# Patient Record
Sex: FEMALE | Race: AMERICAN INDIAN OR ALASKA NATIVE | Employment: OTHER | URBAN - METROPOLITAN AREA
[De-identification: names, ages, dates, MRNs, and addresses within clinical notes are randomized per-mention and may not be internally consistent; named-entity substitution may affect disease eponyms.]

---

## 2017-03-09 ENCOUNTER — HOSPITAL ENCOUNTER (OUTPATIENT)
Dept: OTHER | Age: 82
Discharge: HOME | End: 2017-03-09
Attending: INTERNAL MEDICINE | Admitting: INTERNAL MEDICINE

## 2017-03-09 DIAGNOSIS — R52 PAIN: ICD-10-CM

## 2017-06-05 RX ORDER — DOXEPIN HYDROCHLORIDE 50 MG/1
50 CAPSULE ORAL NIGHTLY
Qty: 30 CAPSULE | Refills: 3 | Status: SHIPPED | OUTPATIENT
Start: 2017-06-05 | End: 2017-07-25 | Stop reason: ALTCHOICE

## 2017-08-07 RX ORDER — GUAIFENESIN/DEXTROMETHORPHAN 100-10MG/5
10 SYRUP ORAL 3 TIMES DAILY PRN
Qty: 240 ML | Refills: 1 | Status: SHIPPED | OUTPATIENT
Start: 2017-08-07 | End: 2017-08-17

## 2017-08-07 RX ORDER — DENTURE CLEANSER
1 TABLET, EFFERVESCENT DENTAL DAILY
Qty: 30 TABLET | Refills: 5 | Status: SHIPPED | OUTPATIENT
Start: 2017-08-07

## 2017-11-14 RX ORDER — PIOGLITAZONE HCL AND METFORMIN HCL 500; 15 MG/1; MG/1
0.5 TABLET ORAL 2 TIMES DAILY WITH MEALS
Qty: 90 TABLET | Refills: 0 | Status: SHIPPED | OUTPATIENT
Start: 2017-11-14 | End: 2018-03-30 | Stop reason: SDUPTHER

## 2017-11-14 RX ORDER — ATORVASTATIN CALCIUM 10 MG/1
10 TABLET, FILM COATED ORAL DAILY
Qty: 90 TABLET | Refills: 0 | Status: SHIPPED | OUTPATIENT
Start: 2017-11-14 | End: 2018-03-30 | Stop reason: SDUPTHER

## 2017-11-14 RX ORDER — OLMESARTAN MEDOXOMIL, AMLODIPINE AND HYDROCHLOROTHIAZIDE TABLET 40/5/12.5 MG 40; 5; 12.5 MG/1; MG/1; MG/1
0.5 TABLET ORAL DAILY
Qty: 45 TABLET | Refills: 0 | Status: SHIPPED | OUTPATIENT
Start: 2017-11-14 | End: 2018-03-30 | Stop reason: SDUPTHER

## 2018-08-17 RX ORDER — LEVOFLOXACIN 500 MG/1
500 TABLET, FILM COATED ORAL DAILY
Qty: 10 TABLET | Refills: 0 | Status: SHIPPED | OUTPATIENT
Start: 2018-08-17 | End: 2018-08-27

## 2018-08-27 RX ORDER — AMLODIPINE BESYLATE 5 MG/1
5 TABLET ORAL DAILY
Qty: 30 TABLET | Refills: 3 | Status: SHIPPED | OUTPATIENT
Start: 2018-08-27 | End: 2018-11-01 | Stop reason: SDUPTHER

## 2018-08-27 RX ORDER — OLMESARTAN MEDOXOMIL 40 MG/1
40 TABLET ORAL DAILY
Qty: 30 TABLET | Refills: 3 | Status: SHIPPED | OUTPATIENT
Start: 2018-08-27 | End: 2018-11-01 | Stop reason: SDUPTHER

## 2018-08-27 RX ORDER — HYDROCHLOROTHIAZIDE 12.5 MG/1
12.5 CAPSULE, GELATIN COATED ORAL DAILY
Qty: 30 CAPSULE | Refills: 3 | Status: SHIPPED | OUTPATIENT
Start: 2018-08-27 | End: 2018-11-01 | Stop reason: SDUPTHER

## 2018-11-05 PROBLEM — E11.42 DIABETIC PERIPHERAL NEUROPATHY (HCC): Status: ACTIVE | Noted: 2018-11-05

## 2018-11-28 RX ORDER — TRAZODONE HYDROCHLORIDE 50 MG/1
50 TABLET ORAL NIGHTLY
Qty: 30 TABLET | Refills: 3 | Status: SHIPPED | OUTPATIENT
Start: 2018-11-28 | End: 2019-03-10 | Stop reason: ALTCHOICE

## 2018-11-28 RX ORDER — OLMESARTAN MEDOXOMIL, AMLODIPINE AND HYDROCHLOROTHIAZIDE TABLET 40/5/12.5 MG 40; 5; 12.5 MG/1; MG/1; MG/1
1 TABLET ORAL DAILY
Qty: 30 TABLET | Refills: 2 | Status: SHIPPED | OUTPATIENT
Start: 2018-11-28 | End: 2019-02-14 | Stop reason: SDUPTHER

## 2018-11-28 RX ORDER — FOLIC ACID 1 MG/1
1 TABLET ORAL DAILY
Qty: 90 TABLET | Refills: 3 | Status: SHIPPED | OUTPATIENT
Start: 2018-11-28 | End: 2019-03-10 | Stop reason: ALTCHOICE

## 2019-02-14 RX ORDER — OLMESARTAN MEDOXOMIL, AMLODIPINE AND HYDROCHLOROTHIAZIDE TABLET 40/5/12.5 MG 40; 5; 12.5 MG/1; MG/1; MG/1
1 TABLET ORAL DAILY
Qty: 90 TABLET | Refills: 0 | Status: SHIPPED | OUTPATIENT
Start: 2019-02-14 | End: 2019-03-10

## 2019-03-10 DIAGNOSIS — E11.9 CONTROLLED TYPE 2 DIABETES MELLITUS WITHOUT COMPLICATION, WITHOUT LONG-TERM CURRENT USE OF INSULIN (HCC): Primary | ICD-10-CM

## 2019-03-10 DIAGNOSIS — I10 ESSENTIAL HYPERTENSION: ICD-10-CM

## 2019-03-10 RX ORDER — FLUTICASONE PROPIONATE 50 MCG
1 SPRAY, SUSPENSION (ML) NASAL DAILY
Qty: 2 BOTTLE | Refills: 1 | Status: SHIPPED | OUTPATIENT
Start: 2019-03-10 | End: 2021-01-13

## 2019-03-10 RX ORDER — PIOGLITAZONE HCL AND METFORMIN HCL 500; 15 MG/1; MG/1
1 TABLET ORAL 2 TIMES DAILY WITH MEALS
Qty: 180 TABLET | Refills: 0 | Status: SHIPPED | OUTPATIENT
Start: 2019-03-10 | End: 2019-07-11 | Stop reason: SDUPTHER

## 2019-03-10 RX ORDER — ACETAMINOPHEN 160 MG
1 TABLET,DISINTEGRATING ORAL DAILY
Qty: 90 CAPSULE | Refills: 0 | Status: SHIPPED | OUTPATIENT
Start: 2019-03-10 | End: 2019-09-04 | Stop reason: SDUPTHER

## 2019-03-10 RX ORDER — CELECOXIB 200 MG/1
200 CAPSULE ORAL DAILY
Qty: 90 CAPSULE | Refills: 1 | Status: SHIPPED | OUTPATIENT
Start: 2019-03-10 | End: 2019-12-09 | Stop reason: SDUPTHER

## 2019-03-10 RX ORDER — ATORVASTATIN CALCIUM 10 MG/1
10 TABLET, FILM COATED ORAL DAILY
Qty: 90 TABLET | Refills: 0 | Status: SHIPPED | OUTPATIENT
Start: 2019-03-10 | End: 2019-06-06 | Stop reason: SDUPTHER

## 2019-03-10 RX ORDER — OMEPRAZOLE 40 MG/1
40 CAPSULE, DELAYED RELEASE ORAL DAILY
Qty: 90 CAPSULE | Refills: 1 | Status: SHIPPED | OUTPATIENT
Start: 2019-03-10 | End: 2019-09-04 | Stop reason: SDUPTHER

## 2019-03-10 RX ORDER — OLMESARTAN MEDOXOMIL, AMLODIPINE AND HYDROCHLOROTHIAZIDE TABLET 40/5/25 MG 40; 5; 25 MG/1; MG/1; MG/1
1 TABLET ORAL DAILY
Qty: 90 TABLET | Refills: 0 | Status: SHIPPED | OUTPATIENT
Start: 2019-03-10 | End: 2019-06-06 | Stop reason: SDUPTHER

## 2019-04-08 ENCOUNTER — TELEPHONE (OUTPATIENT)
Dept: INTERNAL MEDICINE CLINIC | Age: 84
End: 2019-04-08

## 2019-04-08 DIAGNOSIS — I10 ESSENTIAL HYPERTENSION: ICD-10-CM

## 2019-04-08 DIAGNOSIS — E11.9 DIABETES MELLITUS TYPE 2 IN NONOBESE (HCC): Primary | ICD-10-CM

## 2019-04-09 ENCOUNTER — HOSPITAL ENCOUNTER (OUTPATIENT)
Age: 84
Discharge: HOME | End: 2019-04-09
Payer: MEDICARE

## 2019-04-09 DIAGNOSIS — E11.9 DIABETES MELLITUS TYPE 2 IN NONOBESE (HCC): ICD-10-CM

## 2019-04-09 LAB
A/G RATIO: 1.8 (ref 1.1–2.2)
ALBUMIN SERPL-MCNC: 4.4 G/DL (ref 3.4–5)
ALP BLD-CCNC: 65 U/L (ref 40–129)
ALT SERPL-CCNC: 15 U/L (ref 10–40)
ANION GAP SERPL CALCULATED.3IONS-SCNC: 10 MMOL/L (ref 3–16)
AST SERPL-CCNC: 19 U/L (ref 15–37)
BILIRUB SERPL-MCNC: 0.3 MG/DL (ref 0–1)
BUN BLDV-MCNC: 14 MG/DL (ref 7–20)
CALCIUM SERPL-MCNC: 9.8 MG/DL (ref 8.3–10.6)
CHLORIDE BLD-SCNC: 100 MMOL/L (ref 99–110)
CHOLESTEROL, TOTAL: 148 MG/DL (ref 0–199)
CO2: 31 MMOL/L (ref 21–32)
CREAT SERPL-MCNC: <0.5 MG/DL (ref 0.6–1.2)
CREATININE URINE: 70 MG/DL (ref 28–259)
GFR AFRICAN AMERICAN: >60
GFR NON-AFRICAN AMERICAN: >60
GLOBULIN: 2.5 G/DL
GLUCOSE BLD-MCNC: 108 MG/DL (ref 70–99)
HDLC SERPL-MCNC: 69 MG/DL (ref 40–60)
LDL CHOLESTEROL CALCULATED: 65 MG/DL
MICROALBUMIN UR-MCNC: <1.2 MG/DL
MICROALBUMIN/CREAT UR-RTO: NORMAL MG/G (ref 0–30)
POTASSIUM SERPL-SCNC: 4 MMOL/L (ref 3.5–5.1)
SODIUM BLD-SCNC: 141 MMOL/L (ref 136–145)
TOTAL PROTEIN: 6.9 G/DL (ref 6.4–8.2)
TRIGL SERPL-MCNC: 71 MG/DL (ref 0–150)
TSH SERPL DL<=0.05 MIU/L-ACNC: 4.19 UIU/ML (ref 0.27–4.2)
VLDLC SERPL CALC-MCNC: 14 MG/DL

## 2019-04-09 PROCEDURE — 36415 COLL VENOUS BLD VENIPUNCTURE: CPT

## 2019-04-09 PROCEDURE — 84443 ASSAY THYROID STIM HORMONE: CPT

## 2019-04-09 PROCEDURE — 80053 COMPREHEN METABOLIC PANEL: CPT

## 2019-04-09 PROCEDURE — 82570 ASSAY OF URINE CREATININE: CPT

## 2019-04-09 PROCEDURE — 82043 UR ALBUMIN QUANTITATIVE: CPT

## 2019-04-09 PROCEDURE — 80061 LIPID PANEL: CPT

## 2019-04-17 RX ORDER — TRAZODONE HYDROCHLORIDE 50 MG/1
50 TABLET ORAL NIGHTLY
Qty: 90 TABLET | Refills: 1 | Status: SHIPPED | OUTPATIENT
Start: 2019-04-17 | End: 2019-12-08

## 2019-04-17 RX ORDER — ALBUTEROL SULFATE 90 UG/1
2 AEROSOL, METERED RESPIRATORY (INHALATION) EVERY 6 HOURS PRN
Qty: 1 INHALER | Refills: 3 | Status: SHIPPED | OUTPATIENT
Start: 2019-04-17 | End: 2019-10-20 | Stop reason: SDUPTHER

## 2019-06-06 RX ORDER — OLMESARTAN MEDOXOMIL, AMLODIPINE AND HYDROCHLOROTHIAZIDE TABLET 40/5/25 MG 40; 5; 25 MG/1; MG/1; MG/1
1 TABLET ORAL DAILY
Qty: 90 TABLET | Refills: 0 | Status: SHIPPED | OUTPATIENT
Start: 2019-06-06 | End: 2019-09-04 | Stop reason: SDUPTHER

## 2019-06-06 RX ORDER — ATORVASTATIN CALCIUM 10 MG/1
10 TABLET, FILM COATED ORAL DAILY
Qty: 90 TABLET | Refills: 0 | Status: SHIPPED | OUTPATIENT
Start: 2019-06-06 | End: 2019-09-04 | Stop reason: SDUPTHER

## 2019-07-11 RX ORDER — PIOGLITAZONE HCL AND METFORMIN HCL 500; 15 MG/1; MG/1
1 TABLET ORAL 2 TIMES DAILY WITH MEALS
Qty: 180 TABLET | Refills: 0 | Status: SHIPPED | OUTPATIENT
Start: 2019-07-11 | End: 2019-09-04 | Stop reason: SDUPTHER

## 2019-07-15 RX ORDER — CALCIUM POLYCARBOPHIL 625 MG
625 TABLET ORAL 2 TIMES DAILY
Qty: 60 TABLET | Refills: 2 | Status: SHIPPED | OUTPATIENT
Start: 2019-07-15 | End: 2019-08-14

## 2019-09-04 RX ORDER — OLMESARTAN MEDOXOMIL, AMLODIPINE AND HYDROCHLOROTHIAZIDE TABLET 40/5/25 MG 40; 5; 25 MG/1; MG/1; MG/1
1 TABLET ORAL DAILY
Qty: 90 TABLET | Refills: 0 | Status: SHIPPED | OUTPATIENT
Start: 2019-09-04 | End: 2020-01-09

## 2019-09-04 RX ORDER — OMEPRAZOLE 40 MG/1
40 CAPSULE, DELAYED RELEASE ORAL DAILY
Qty: 90 CAPSULE | Refills: 1 | Status: SHIPPED | OUTPATIENT
Start: 2019-09-04 | End: 2020-02-26 | Stop reason: SDUPTHER

## 2019-09-04 RX ORDER — ASPIRIN 81 MG/1
81 TABLET ORAL DAILY
Qty: 90 TABLET | Refills: 3 | Status: SHIPPED | OUTPATIENT
Start: 2019-09-04 | End: 2021-01-13 | Stop reason: SDUPTHER

## 2019-09-04 RX ORDER — PIOGLITAZONE HCL AND METFORMIN HCL 500; 15 MG/1; MG/1
1 TABLET ORAL 2 TIMES DAILY WITH MEALS
Qty: 180 TABLET | Refills: 0 | Status: SHIPPED | OUTPATIENT
Start: 2019-09-04 | End: 2020-04-09

## 2019-09-04 RX ORDER — ACETAMINOPHEN 160 MG
1 TABLET,DISINTEGRATING ORAL DAILY
Qty: 90 CAPSULE | Refills: 0 | Status: SHIPPED | OUTPATIENT
Start: 2019-09-04 | End: 2021-09-28 | Stop reason: SDUPTHER

## 2019-09-04 RX ORDER — ATORVASTATIN CALCIUM 10 MG/1
10 TABLET, FILM COATED ORAL DAILY
Qty: 90 TABLET | Refills: 0 | Status: SHIPPED | OUTPATIENT
Start: 2019-09-04 | End: 2020-01-06 | Stop reason: SDUPTHER

## 2019-09-12 ENCOUNTER — HOSPITAL ENCOUNTER (OUTPATIENT)
Age: 84
Discharge: HOME | End: 2019-09-12
Payer: MEDICARE

## 2019-09-12 ENCOUNTER — HOSPITAL ENCOUNTER (OUTPATIENT)
Dept: GENERAL RADIOLOGY | Age: 84
Discharge: HOME | End: 2019-09-12
Payer: MEDICARE

## 2019-09-12 ENCOUNTER — HOSPITAL ENCOUNTER (OUTPATIENT)
Dept: PULMONOLOGY | Age: 84
Discharge: HOME | End: 2019-09-12
Payer: MEDICARE

## 2019-09-12 VITALS — OXYGEN SATURATION: 98 % | RESPIRATION RATE: 16 BRPM | HEART RATE: 77 BPM

## 2019-09-12 DIAGNOSIS — R05.3 CHRONIC COUGH: ICD-10-CM

## 2019-09-12 DIAGNOSIS — J45.20 MILD INTERMITTENT REACTIVE AIRWAY DISEASE WITHOUT COMPLICATION: ICD-10-CM

## 2019-09-12 DIAGNOSIS — R06.89 DYSPNEA AND RESPIRATORY ABNORMALITIES: ICD-10-CM

## 2019-09-12 DIAGNOSIS — R06.00 DYSPNEA AND RESPIRATORY ABNORMALITIES: ICD-10-CM

## 2019-09-12 PROBLEM — I51.89 DIASTOLIC DYSFUNCTION: Status: ACTIVE | Noted: 2019-09-12

## 2019-09-12 LAB
DLCO %PRED: 59 %
DLCO PRED: NORMAL ML/MIN/MMHG
DLCO/VA %PRED: NORMAL %
DLCO/VA PRED: NORMAL ML/MIN/MMHG
DLCO/VA: NORMAL ML/MIN/MMHG
DLCO: NORMAL ML/MIN/MMHG
EXPIRATORY TIME: NORMAL SEC
FEF 25-75% %PRED-PRE: NORMAL L/SEC
FEF 25-75% PRED: NORMAL L/SEC
FEF 25-75%-PRE: NORMAL L/SEC
FEV1 %PRED-PRE: 58 %
FEV1 PRED: NORMAL L
FEV1/FVC %PRED-PRE: NORMAL %
FEV1/FVC PRED: NORMAL %
FEV1/FVC: 94 %
FEV1: NORMAL L
FVC %PRED-PRE: NORMAL %
FVC PRED: NORMAL L
FVC: NORMAL L
GAW %PRED: NORMAL %
GAW PRED: NORMAL L/S/CMH2O
GAW: NORMAL L/S/CMH2O
IC %PRED: NORMAL %
IC PRED: NORMAL L
IC: NORMAL L
MVV %PRED-PRE: NORMAL %
MVV PRED: NORMAL L/MIN
MVV-PRE: NORMAL L/MIN
PEF %PRED-PRE: NORMAL L/SEC
PEF PRED: NORMAL L/SEC
PEF-PRE: NORMAL L/SEC
RAW %PRED: NORMAL %
RAW PRED: NORMAL CMH2O/L/S
RAW: NORMAL CMH2O/L/S
RV %PRED: NORMAL %
RV PRED: NORMAL L
RV: NORMAL L
SVC %PRED: NORMAL %
SVC PRED: NORMAL L
SVC: NORMAL L
TLC %PRED: 216 %
TLC PRED: NORMAL L
TLC: NORMAL L
VA %PRED: NORMAL %
VA PRED: NORMAL L
VA: NORMAL L
VTG %PRED: NORMAL %
VTG PRED: NORMAL L
VTG: NORMAL L

## 2019-09-12 PROCEDURE — 94760 N-INVAS EAR/PLS OXIMETRY 1: CPT

## 2019-09-12 PROCEDURE — 6370000000 HC RX 637 (ALT 250 FOR IP): Performed by: INTERNAL MEDICINE

## 2019-09-12 PROCEDURE — 71046 X-RAY EXAM CHEST 2 VIEWS: CPT

## 2019-09-12 PROCEDURE — 94726 PLETHYSMOGRAPHY LUNG VOLUMES: CPT

## 2019-09-12 PROCEDURE — 94060 EVALUATION OF WHEEZING: CPT

## 2019-09-12 PROCEDURE — 94200 LUNG FUNCTION TEST (MBC/MVV): CPT

## 2019-09-12 PROCEDURE — 94729 DIFFUSING CAPACITY: CPT

## 2019-09-12 RX ORDER — ALBUTEROL SULFATE 90 UG/1
4 AEROSOL, METERED RESPIRATORY (INHALATION) ONCE
Status: COMPLETED | OUTPATIENT
Start: 2019-09-12 | End: 2019-09-12

## 2019-09-12 RX ADMIN — Medication 4 PUFF: at 14:42

## 2019-09-12 ASSESSMENT — PULMONARY FUNCTION TESTS
FEV1_PERCENT_PREDICTED_PRE: 58
FEV1/FVC: 94

## 2019-09-13 RX ORDER — FLUTICASONE PROPIONATE 50 MCG
2 SPRAY, SUSPENSION (ML) NASAL DAILY
Qty: 1 BOTTLE | Refills: 5 | Status: SHIPPED | OUTPATIENT
Start: 2019-09-13 | End: 2021-01-13

## 2019-09-28 DIAGNOSIS — E11.9 CONTROLLED TYPE 2 DIABETES MELLITUS WITHOUT COMPLICATION, WITHOUT LONG-TERM CURRENT USE OF INSULIN (HCC): Primary | ICD-10-CM

## 2019-09-28 RX ORDER — GLUCOSAMINE HCL/CHONDROITIN SU 500-400 MG
CAPSULE ORAL
Qty: 50 STRIP | Refills: 0 | Status: SHIPPED | OUTPATIENT
Start: 2019-09-28 | End: 2019-10-28 | Stop reason: SDUPTHER

## 2019-09-28 RX ORDER — BLOOD PRESSURE TEST KIT
1 KIT MISCELLANEOUS DAILY
Qty: 100 EACH | Refills: 2 | Status: SHIPPED | OUTPATIENT
Start: 2019-09-28 | End: 2023-09-06 | Stop reason: SDUPTHER

## 2019-10-20 RX ORDER — ALBUTEROL SULFATE 90 UG/1
2 AEROSOL, METERED RESPIRATORY (INHALATION) EVERY 6 HOURS PRN
Qty: 1 INHALER | Refills: 3 | Status: SHIPPED | OUTPATIENT
Start: 2019-10-20 | End: 2020-09-24

## 2019-10-28 DIAGNOSIS — E11.9 CONTROLLED TYPE 2 DIABETES MELLITUS WITHOUT COMPLICATION, WITHOUT LONG-TERM CURRENT USE OF INSULIN (HCC): Primary | ICD-10-CM

## 2019-10-28 RX ORDER — GLUCOSAMINE HCL/CHONDROITIN SU 500-400 MG
CAPSULE ORAL
Qty: 50 STRIP | Refills: 0 | Status: ON HOLD | OUTPATIENT
Start: 2019-10-28 | End: 2023-03-08 | Stop reason: HOSPADM

## 2019-12-08 RX ORDER — DOXEPIN HYDROCHLORIDE 50 MG/1
50 CAPSULE ORAL NIGHTLY
Qty: 30 CAPSULE | Refills: 3 | Status: SHIPPED | OUTPATIENT
Start: 2019-12-08 | End: 2020-04-02

## 2019-12-11 RX ORDER — CELECOXIB 200 MG/1
200 CAPSULE ORAL DAILY
Qty: 90 CAPSULE | Refills: 1 | Status: SHIPPED | OUTPATIENT
Start: 2019-12-11 | End: 2021-01-13

## 2020-01-06 RX ORDER — ATORVASTATIN CALCIUM 10 MG/1
10 TABLET, FILM COATED ORAL DAILY
Qty: 90 TABLET | Refills: 0 | Status: SHIPPED | OUTPATIENT
Start: 2020-01-06 | End: 2020-04-09

## 2020-02-26 RX ORDER — OMEPRAZOLE 40 MG/1
40 CAPSULE, DELAYED RELEASE ORAL DAILY
Qty: 90 CAPSULE | Refills: 1 | Status: SHIPPED | OUTPATIENT
Start: 2020-02-26 | End: 2020-09-24 | Stop reason: SDUPTHER

## 2020-04-12 RX ORDER — SIMETHICONE 80 MG
80 TABLET,CHEWABLE ORAL 4 TIMES DAILY PRN
Qty: 180 TABLET | Refills: 3 | Status: SHIPPED | OUTPATIENT
Start: 2020-04-12 | End: 2021-01-13

## 2020-04-12 RX ORDER — DOXEPIN HYDROCHLORIDE 50 MG/1
50 CAPSULE ORAL NIGHTLY
Qty: 90 CAPSULE | Refills: 2 | Status: SHIPPED | OUTPATIENT
Start: 2020-04-12 | End: 2020-09-19 | Stop reason: SDUPTHER

## 2020-05-25 RX ORDER — PIOGLITAZONE HCL AND METFORMIN HCL 500; 15 MG/1; MG/1
1 TABLET ORAL 2 TIMES DAILY WITH MEALS
Qty: 180 TABLET | Refills: 0 | Status: SHIPPED | OUTPATIENT
Start: 2020-05-25 | End: 2020-09-14

## 2020-05-25 RX ORDER — ATORVASTATIN CALCIUM 10 MG/1
10 TABLET, FILM COATED ORAL DAILY
Qty: 90 TABLET | Refills: 0 | Status: SHIPPED | OUTPATIENT
Start: 2020-05-25 | End: 2020-09-14

## 2020-05-25 RX ORDER — OLMESARTAN MEDOXOMIL, AMLODIPINE AND HYDROCHLOROTHIAZIDE TABLET 40/5/25 MG 40; 5; 25 MG/1; MG/1; MG/1
1 TABLET ORAL DAILY
Qty: 90 TABLET | Refills: 0 | Status: SHIPPED | OUTPATIENT
Start: 2020-05-25 | End: 2020-09-14

## 2020-05-26 ENCOUNTER — HOSPITAL ENCOUNTER (OUTPATIENT)
Dept: GENERAL RADIOLOGY | Age: 85
Discharge: HOME | End: 2020-05-26
Payer: MEDICARE

## 2020-05-26 ENCOUNTER — HOSPITAL ENCOUNTER (OUTPATIENT)
Age: 85
Discharge: HOME | End: 2020-05-26
Payer: MEDICARE

## 2020-05-26 PROCEDURE — 73562 X-RAY EXAM OF KNEE 3: CPT

## 2020-05-27 RX ORDER — ALBUTEROL SULFATE 90 UG/1
2 AEROSOL, METERED RESPIRATORY (INHALATION) 4 TIMES DAILY PRN
Qty: 3 INHALER | Refills: 1 | Status: SHIPPED | OUTPATIENT
Start: 2020-05-27 | End: 2020-12-13

## 2020-06-01 ENCOUNTER — HOSPITAL ENCOUNTER (OUTPATIENT)
Age: 85
Discharge: HOME | End: 2020-06-01
Payer: MEDICARE

## 2020-06-01 LAB
A/G RATIO: 1.7 (ref 1.1–2.2)
ALBUMIN SERPL-MCNC: 4.3 G/DL (ref 3.4–5)
ALP BLD-CCNC: 68 U/L (ref 40–129)
ALT SERPL-CCNC: 20 U/L (ref 10–40)
ANION GAP SERPL CALCULATED.3IONS-SCNC: 9 MMOL/L (ref 3–16)
AST SERPL-CCNC: 24 U/L (ref 15–37)
BILIRUB SERPL-MCNC: 0.3 MG/DL (ref 0–1)
BUN BLDV-MCNC: 18 MG/DL (ref 7–20)
CALCIUM SERPL-MCNC: 9.4 MG/DL (ref 8.3–10.6)
CHLORIDE BLD-SCNC: 96 MMOL/L (ref 99–110)
CHOLESTEROL, TOTAL: 157 MG/DL (ref 0–199)
CO2: 31 MMOL/L (ref 21–32)
CREAT SERPL-MCNC: 0.6 MG/DL (ref 0.6–1.2)
CREATININE URINE: 59.4 MG/DL (ref 28–259)
ESTIMATED AVERAGE GLUCOSE: 148.5 MG/DL
GFR AFRICAN AMERICAN: >60
GFR NON-AFRICAN AMERICAN: >60
GLOBULIN: 2.5 G/DL
GLUCOSE BLD-MCNC: 105 MG/DL (ref 70–99)
HBA1C MFR BLD: 6.8 %
HCT VFR BLD CALC: 39.1 % (ref 36–48)
HDLC SERPL-MCNC: 69 MG/DL (ref 40–60)
HEMOGLOBIN: 13 G/DL (ref 12–16)
LDL CHOLESTEROL CALCULATED: 71 MG/DL
MCH RBC QN AUTO: 29.7 PG (ref 26–34)
MCHC RBC AUTO-ENTMCNC: 33.4 G/DL (ref 31–36)
MCV RBC AUTO: 89 FL (ref 80–100)
MICROALBUMIN UR-MCNC: <1.2 MG/DL
MICROALBUMIN/CREAT UR-RTO: NORMAL MG/G (ref 0–30)
PDW BLD-RTO: 14.4 % (ref 12.4–15.4)
PLATELET # BLD: 238 K/UL (ref 135–450)
PMV BLD AUTO: 9.1 FL (ref 5–10.5)
POTASSIUM SERPL-SCNC: 4.1 MMOL/L (ref 3.5–5.1)
RBC # BLD: 4.39 M/UL (ref 4–5.2)
SODIUM BLD-SCNC: 136 MMOL/L (ref 136–145)
TOTAL PROTEIN: 6.8 G/DL (ref 6.4–8.2)
TRIGL SERPL-MCNC: 87 MG/DL (ref 0–150)
TSH SERPL DL<=0.05 MIU/L-ACNC: 8.53 UIU/ML (ref 0.27–4.2)
VLDLC SERPL CALC-MCNC: 17 MG/DL
WBC # BLD: 6.3 K/UL (ref 4–11)

## 2020-06-01 PROCEDURE — 84443 ASSAY THYROID STIM HORMONE: CPT

## 2020-06-01 PROCEDURE — 36415 COLL VENOUS BLD VENIPUNCTURE: CPT

## 2020-06-01 PROCEDURE — 82043 UR ALBUMIN QUANTITATIVE: CPT

## 2020-06-01 PROCEDURE — 85027 COMPLETE CBC AUTOMATED: CPT

## 2020-06-01 PROCEDURE — 83036 HEMOGLOBIN GLYCOSYLATED A1C: CPT

## 2020-06-01 PROCEDURE — 82570 ASSAY OF URINE CREATININE: CPT

## 2020-06-01 PROCEDURE — 80053 COMPREHEN METABOLIC PANEL: CPT

## 2020-06-01 PROCEDURE — 80061 LIPID PANEL: CPT

## 2020-06-01 RX ORDER — LEVOTHYROXINE SODIUM 0.05 MG/1
50 TABLET ORAL DAILY
Qty: 90 TABLET | Refills: 1 | Status: SHIPPED | OUTPATIENT
Start: 2020-06-01 | End: 2021-01-13

## 2020-08-09 RX ORDER — BUDESONIDE AND FORMOTEROL FUMARATE DIHYDRATE 160; 4.5 UG/1; UG/1
2 AEROSOL RESPIRATORY (INHALATION) 2 TIMES DAILY
Qty: 3 INHALER | Refills: 1 | Status: SHIPPED | OUTPATIENT
Start: 2020-08-09 | End: 2020-12-13

## 2020-08-16 RX ORDER — MONTELUKAST SODIUM 10 MG/1
10 TABLET ORAL DAILY
Qty: 30 TABLET | Refills: 3 | Status: SHIPPED | OUTPATIENT
Start: 2020-08-16 | End: 2021-09-28

## 2020-08-16 RX ORDER — FLUTICASONE PROPIONATE 50 MCG
1 SPRAY, SUSPENSION (ML) NASAL DAILY
Qty: 2 BOTTLE | Refills: 1 | Status: SHIPPED | OUTPATIENT
Start: 2020-08-16 | End: 2021-01-13

## 2020-09-19 RX ORDER — DOXEPIN HYDROCHLORIDE 50 MG/1
50 CAPSULE ORAL NIGHTLY
Qty: 90 CAPSULE | Refills: 1 | Status: SHIPPED | OUTPATIENT
Start: 2020-09-19 | End: 2021-01-13 | Stop reason: SDUPTHER

## 2020-10-19 RX ORDER — OLMESARTAN MEDOXOMIL, AMLODIPINE AND HYDROCHLOROTHIAZIDE TABLET 40/10/25 MG 40; 10; 25 MG/1; MG/1; MG/1
1 TABLET ORAL DAILY
Qty: 30 TABLET | Refills: 1 | Status: SHIPPED | OUTPATIENT
Start: 2020-10-19 | End: 2020-11-09 | Stop reason: SDUPTHER

## 2020-10-26 RX ORDER — BUDESONIDE AND FORMOTEROL FUMARATE DIHYDRATE 160; 4.5 UG/1; UG/1
2 AEROSOL RESPIRATORY (INHALATION) 2 TIMES DAILY
Qty: 3 INHALER | Refills: 1 | Status: SHIPPED | OUTPATIENT
Start: 2020-10-26 | End: 2021-01-13 | Stop reason: SDUPTHER

## 2021-01-13 RX ORDER — PIOGLITAZONE HCL AND METFORMIN HCL 500; 15 MG/1; MG/1
1 TABLET ORAL 2 TIMES DAILY WITH MEALS
Qty: 180 TABLET | Refills: 0 | Status: SHIPPED | OUTPATIENT
Start: 2021-01-13 | End: 2021-02-18 | Stop reason: SDUPTHER

## 2021-01-13 RX ORDER — OMEPRAZOLE 40 MG/1
40 CAPSULE, DELAYED RELEASE ORAL DAILY
Qty: 90 CAPSULE | Refills: 1 | Status: SHIPPED | OUTPATIENT
Start: 2021-01-13 | End: 2021-04-22 | Stop reason: SDUPTHER

## 2021-01-13 RX ORDER — ALBUTEROL SULFATE 90 UG/1
2 AEROSOL, METERED RESPIRATORY (INHALATION) 4 TIMES DAILY PRN
Qty: 3 INHALER | Refills: 1 | Status: SHIPPED | OUTPATIENT
Start: 2021-01-13 | End: 2021-06-17 | Stop reason: SDUPTHER

## 2021-01-13 RX ORDER — ATORVASTATIN CALCIUM 10 MG/1
10 TABLET, FILM COATED ORAL DAILY
Qty: 90 TABLET | Refills: 3 | Status: SHIPPED | OUTPATIENT
Start: 2021-01-13 | End: 2022-02-18 | Stop reason: SDUPTHER

## 2021-01-13 RX ORDER — ASPIRIN 81 MG/1
81 TABLET ORAL DAILY
Qty: 90 TABLET | Refills: 3 | Status: ON HOLD | OUTPATIENT
Start: 2021-01-13 | End: 2021-05-03

## 2021-01-13 RX ORDER — BUDESONIDE AND FORMOTEROL FUMARATE DIHYDRATE 160; 4.5 UG/1; UG/1
2 AEROSOL RESPIRATORY (INHALATION) 2 TIMES DAILY
Qty: 3 INHALER | Refills: 1 | Status: SHIPPED | OUTPATIENT
Start: 2021-01-13 | End: 2021-04-22 | Stop reason: SDUPTHER

## 2021-01-13 RX ORDER — DOXEPIN HYDROCHLORIDE 50 MG/1
50 CAPSULE ORAL NIGHTLY
Qty: 90 CAPSULE | Refills: 1 | Status: SHIPPED | OUTPATIENT
Start: 2021-01-13 | End: 2021-04-22 | Stop reason: SDUPTHER

## 2021-01-13 RX ORDER — OLMESARTAN MEDOXOMIL, AMLODIPINE AND HYDROCHLOROTHIAZIDE TABLET 40/10/25 MG 40; 10; 25 MG/1; MG/1; MG/1
1 TABLET ORAL DAILY
Qty: 30 TABLET | Refills: 3 | Status: SHIPPED | OUTPATIENT
Start: 2021-01-13 | End: 2021-01-23 | Stop reason: SDUPTHER

## 2021-01-23 RX ORDER — OLMESARTAN MEDOXOMIL, AMLODIPINE AND HYDROCHLOROTHIAZIDE TABLET 40/10/25 MG 40; 10; 25 MG/1; MG/1; MG/1
1 TABLET ORAL DAILY
Qty: 90 TABLET | Refills: 1 | Status: SHIPPED | OUTPATIENT
Start: 2021-01-23 | End: 2021-04-22 | Stop reason: SDUPTHER

## 2021-02-18 RX ORDER — PIOGLITAZONE HCL AND METFORMIN HCL 500; 15 MG/1; MG/1
1 TABLET ORAL 2 TIMES DAILY WITH MEALS
Qty: 180 TABLET | Refills: 0 | Status: SHIPPED | OUTPATIENT
Start: 2021-02-18 | End: 2021-04-22 | Stop reason: SDUPTHER

## 2021-02-20 ENCOUNTER — HOSPITAL ENCOUNTER (OUTPATIENT)
Age: 86
Discharge: HOME | End: 2021-02-20
Payer: MEDICARE

## 2021-02-20 DIAGNOSIS — E11.9 CONTROLLED TYPE 2 DIABETES MELLITUS WITHOUT COMPLICATION, WITHOUT LONG-TERM CURRENT USE OF INSULIN (HCC): ICD-10-CM

## 2021-02-20 LAB
A/G RATIO: 1.4 (ref 1.1–2.2)
ALBUMIN SERPL-MCNC: 4.3 G/DL (ref 3.4–5)
ALP BLD-CCNC: 74 U/L (ref 40–129)
ALT SERPL-CCNC: 18 U/L (ref 10–40)
ANION GAP SERPL CALCULATED.3IONS-SCNC: 11 MMOL/L (ref 3–16)
AST SERPL-CCNC: 23 U/L (ref 15–37)
BILIRUB SERPL-MCNC: <0.2 MG/DL (ref 0–1)
BUN BLDV-MCNC: 12 MG/DL (ref 7–20)
CALCIUM SERPL-MCNC: 9.5 MG/DL (ref 8.3–10.6)
CHLORIDE BLD-SCNC: 96 MMOL/L (ref 99–110)
CHOLESTEROL, TOTAL: 158 MG/DL (ref 0–199)
CO2: 28 MMOL/L (ref 21–32)
CREAT SERPL-MCNC: 0.6 MG/DL (ref 0.6–1.2)
CREATININE URINE: 36 MG/DL (ref 28–259)
GFR AFRICAN AMERICAN: >60
GFR NON-AFRICAN AMERICAN: >60
GLOBULIN: 3 G/DL
GLUCOSE BLD-MCNC: 107 MG/DL (ref 70–99)
HCT VFR BLD CALC: 40 % (ref 36–48)
HDLC SERPL-MCNC: 81 MG/DL (ref 40–60)
HEMOGLOBIN: 13.3 G/DL (ref 12–16)
LDL CHOLESTEROL CALCULATED: 66 MG/DL
MCH RBC QN AUTO: 29.7 PG (ref 26–34)
MCHC RBC AUTO-ENTMCNC: 33.2 G/DL (ref 31–36)
MCV RBC AUTO: 89.5 FL (ref 80–100)
MICROALBUMIN UR-MCNC: <1.2 MG/DL
MICROALBUMIN/CREAT UR-RTO: NORMAL MG/G (ref 0–30)
PDW BLD-RTO: 14.2 % (ref 12.4–15.4)
PLATELET # BLD: 231 K/UL (ref 135–450)
PMV BLD AUTO: 9.2 FL (ref 5–10.5)
POTASSIUM SERPL-SCNC: 3.8 MMOL/L (ref 3.5–5.1)
RBC # BLD: 4.47 M/UL (ref 4–5.2)
SODIUM BLD-SCNC: 135 MMOL/L (ref 136–145)
TOTAL PROTEIN: 7.3 G/DL (ref 6.4–8.2)
TRIGL SERPL-MCNC: 57 MG/DL (ref 0–150)
TSH SERPL DL<=0.05 MIU/L-ACNC: 4.01 UIU/ML (ref 0.27–4.2)
VLDLC SERPL CALC-MCNC: 11 MG/DL
WBC # BLD: 5.9 K/UL (ref 4–11)

## 2021-02-20 PROCEDURE — 82570 ASSAY OF URINE CREATININE: CPT

## 2021-02-20 PROCEDURE — 82043 UR ALBUMIN QUANTITATIVE: CPT

## 2021-02-20 PROCEDURE — 85027 COMPLETE CBC AUTOMATED: CPT

## 2021-02-20 PROCEDURE — 80061 LIPID PANEL: CPT

## 2021-02-20 PROCEDURE — 36415 COLL VENOUS BLD VENIPUNCTURE: CPT

## 2021-02-20 PROCEDURE — 80053 COMPREHEN METABOLIC PANEL: CPT

## 2021-02-20 PROCEDURE — 83036 HEMOGLOBIN GLYCOSYLATED A1C: CPT

## 2021-02-20 PROCEDURE — 84443 ASSAY THYROID STIM HORMONE: CPT

## 2021-02-21 LAB
ESTIMATED AVERAGE GLUCOSE: 142.7 MG/DL
HBA1C MFR BLD: 6.6 %

## 2021-04-22 ENCOUNTER — HOSPITAL ENCOUNTER (OUTPATIENT)
Dept: GENERAL RADIOLOGY | Age: 86
Discharge: HOME | End: 2021-04-22
Payer: MEDICARE

## 2021-04-22 ENCOUNTER — HOSPITAL ENCOUNTER (OUTPATIENT)
Dept: CT IMAGING | Age: 86
Discharge: HOME | End: 2021-04-22
Payer: MEDICARE

## 2021-04-22 ENCOUNTER — HOSPITAL ENCOUNTER (OUTPATIENT)
Age: 86
Discharge: HOME | End: 2021-04-22
Payer: MEDICARE

## 2021-04-22 DIAGNOSIS — R06.09 EXERTIONAL DYSPNEA: ICD-10-CM

## 2021-04-22 DIAGNOSIS — R05.3 CHRONIC COUGH: ICD-10-CM

## 2021-04-22 DIAGNOSIS — R91.8 HILAR MASS: ICD-10-CM

## 2021-04-22 LAB
ANION GAP SERPL CALCULATED.3IONS-SCNC: 8 MMOL/L (ref 3–16)
BUN BLDV-MCNC: 18 MG/DL (ref 7–20)
CALCIUM SERPL-MCNC: 7.7 MG/DL (ref 8.3–10.6)
CHLORIDE BLD-SCNC: 101 MMOL/L (ref 99–110)
CO2: 25 MMOL/L (ref 21–32)
CREAT SERPL-MCNC: <0.5 MG/DL (ref 0.6–1.2)
GFR AFRICAN AMERICAN: >60
GFR NON-AFRICAN AMERICAN: >60
GLUCOSE BLD-MCNC: 137 MG/DL (ref 70–99)
POTASSIUM SERPL-SCNC: 3.8 MMOL/L (ref 3.5–5.1)
SODIUM BLD-SCNC: 134 MMOL/L (ref 136–145)

## 2021-04-22 PROCEDURE — 6360000004 HC RX CONTRAST MEDICATION: Performed by: INTERNAL MEDICINE

## 2021-04-22 PROCEDURE — 36415 COLL VENOUS BLD VENIPUNCTURE: CPT

## 2021-04-22 PROCEDURE — 71046 X-RAY EXAM CHEST 2 VIEWS: CPT

## 2021-04-22 PROCEDURE — 71260 CT THORAX DX C+: CPT

## 2021-04-22 PROCEDURE — 80048 BASIC METABOLIC PNL TOTAL CA: CPT

## 2021-04-22 RX ADMIN — IOPAMIDOL 75 ML: 755 INJECTION, SOLUTION INTRAVENOUS at 19:40

## 2021-04-30 ENCOUNTER — APPOINTMENT (OUTPATIENT)
Dept: GENERAL RADIOLOGY | Age: 86
DRG: 516 | End: 2021-04-30
Payer: MEDICARE

## 2021-04-30 ENCOUNTER — APPOINTMENT (OUTPATIENT)
Dept: CT IMAGING | Age: 86
DRG: 516 | End: 2021-04-30
Payer: MEDICARE

## 2021-04-30 ENCOUNTER — HOSPITAL ENCOUNTER (INPATIENT)
Age: 86
LOS: 6 days | Discharge: NURSING, REHAB OR LONG-TERM CARE | DRG: 516 | End: 2021-05-06
Attending: STUDENT IN AN ORGANIZED HEALTH CARE EDUCATION/TRAINING PROGRAM | Admitting: INTERNAL MEDICINE
Payer: MEDICARE

## 2021-04-30 DIAGNOSIS — S82.002A CLOSED DISPLACED FRACTURE OF LEFT PATELLA, UNSPECIFIED FRACTURE MORPHOLOGY, INITIAL ENCOUNTER: ICD-10-CM
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
## 2021-04-30 DIAGNOSIS — S02.2XXA CLOSED FRACTURE OF NASAL BONE, INITIAL ENCOUNTER: Primary | ICD-10-CM

## 2021-04-30 PROBLEM — S82.009A PATELLA FRACTURE: Status: ACTIVE | Noted: 2021-04-30

## 2021-04-30 PROBLEM — S82.042A CLOSED COMMINUTED FRACTURE OF LEFT PATELLA, INITIAL ENCOUNTER: Status: ACTIVE | Noted: 2021-04-30

## 2021-04-30 LAB
A/G RATIO: 1.5 (ref 1.1–2.2)
ALBUMIN SERPL-MCNC: 4.3 G/DL (ref 3.4–5)
ALP BLD-CCNC: 65 U/L (ref 40–129)
ALT SERPL-CCNC: 15 U/L (ref 10–40)
ANION GAP SERPL CALCULATED.3IONS-SCNC: 12 MMOL/L (ref 3–16)
AST SERPL-CCNC: 21 U/L (ref 15–37)
BASOPHILS ABSOLUTE: 0 K/UL (ref 0–0.2)
BASOPHILS RELATIVE PERCENT: 0.2 %
BILIRUB SERPL-MCNC: <0.2 MG/DL (ref 0–1)
BUN BLDV-MCNC: 16 MG/DL (ref 7–20)
CALCIUM SERPL-MCNC: 9.3 MG/DL (ref 8.3–10.6)
CHLORIDE BLD-SCNC: 92 MMOL/L (ref 99–110)
CO2: 26 MMOL/L (ref 21–32)
CREAT SERPL-MCNC: 0.6 MG/DL (ref 0.6–1.2)
EOSINOPHILS ABSOLUTE: 0.2 K/UL (ref 0–0.6)
EOSINOPHILS RELATIVE PERCENT: 1.6 %
GFR AFRICAN AMERICAN: >60
GFR NON-AFRICAN AMERICAN: >60
GLOBULIN: 2.8 G/DL
GLUCOSE BLD-MCNC: 162 MG/DL (ref 70–99)
GLUCOSE BLD-MCNC: 181 MG/DL (ref 70–99)
HCT VFR BLD CALC: 37.6 % (ref 36–48)
HEMOGLOBIN: 12.4 G/DL (ref 12–16)
INR BLD: 1.03 (ref 0.86–1.14)
LYMPHOCYTES ABSOLUTE: 1.4 K/UL (ref 1–5.1)
LYMPHOCYTES RELATIVE PERCENT: 14.3 %
MCH RBC QN AUTO: 29.2 PG (ref 26–34)
MCHC RBC AUTO-ENTMCNC: 32.9 G/DL (ref 31–36)
MCV RBC AUTO: 88.8 FL (ref 80–100)
MONOCYTES ABSOLUTE: 0.8 K/UL (ref 0–1.3)
MONOCYTES RELATIVE PERCENT: 8.9 %
NEUTROPHILS ABSOLUTE: 7.2 K/UL (ref 1.7–7.7)
NEUTROPHILS RELATIVE PERCENT: 75 %
PDW BLD-RTO: 14.6 % (ref 12.4–15.4)
PERFORMED ON: ABNORMAL
PLATELET # BLD: 226 K/UL (ref 135–450)
PMV BLD AUTO: 8.6 FL (ref 5–10.5)
POTASSIUM REFLEX MAGNESIUM: 3.6 MMOL/L (ref 3.5–5.1)
PROTHROMBIN TIME: 11.9 SEC (ref 10–13.2)
RBC # BLD: 4.23 M/UL (ref 4–5.2)
SODIUM BLD-SCNC: 130 MMOL/L (ref 136–145)
TOTAL PROTEIN: 7.1 G/DL (ref 6.4–8.2)
WBC # BLD: 9.5 K/UL (ref 4–11)

## 2021-04-30 PROCEDURE — 72125 CT NECK SPINE W/O DYE: CPT

## 2021-04-30 PROCEDURE — 73030 X-RAY EXAM OF SHOULDER: CPT

## 2021-04-30 PROCEDURE — 36415 COLL VENOUS BLD VENIPUNCTURE: CPT

## 2021-04-30 PROCEDURE — 73700 CT LOWER EXTREMITY W/O DYE: CPT

## 2021-04-30 PROCEDURE — 73562 X-RAY EXAM OF KNEE 3: CPT

## 2021-04-30 PROCEDURE — 1200000000 HC SEMI PRIVATE

## 2021-04-30 PROCEDURE — 6370000000 HC RX 637 (ALT 250 FOR IP): Performed by: PHYSICIAN ASSISTANT

## 2021-04-30 PROCEDURE — 85025 COMPLETE CBC W/AUTO DIFF WBC: CPT

## 2021-04-30 PROCEDURE — 85610 PROTHROMBIN TIME: CPT

## 2021-04-30 PROCEDURE — 70450 CT HEAD/BRAIN W/O DYE: CPT

## 2021-04-30 PROCEDURE — 99283 EMERGENCY DEPT VISIT LOW MDM: CPT

## 2021-04-30 PROCEDURE — 70486 CT MAXILLOFACIAL W/O DYE: CPT

## 2021-04-30 PROCEDURE — 80053 COMPREHEN METABOLIC PANEL: CPT

## 2021-04-30 RX ORDER — SODIUM CHLORIDE 0.9 % (FLUSH) 0.9 %
10 SYRINGE (ML) INJECTION PRN
Status: DISCONTINUED | OUTPATIENT
Start: 2021-04-30 | End: 2021-05-06 | Stop reason: HOSPADM

## 2021-04-30 RX ORDER — VITAMIN B COMPLEX
2000 TABLET ORAL DAILY
Status: DISCONTINUED | OUTPATIENT
Start: 2021-05-01 | End: 2021-05-06 | Stop reason: HOSPADM

## 2021-04-30 RX ORDER — OMEPRAZOLE 10 MG/1
40 CAPSULE, DELAYED RELEASE ORAL DAILY
Status: DISCONTINUED | OUTPATIENT
Start: 2021-05-01 | End: 2021-05-01 | Stop reason: CLARIF

## 2021-04-30 RX ORDER — HYDROCODONE BITARTRATE AND ACETAMINOPHEN 5; 325 MG/1; MG/1
1 TABLET ORAL ONCE
Status: COMPLETED | OUTPATIENT
Start: 2021-04-30 | End: 2021-04-30

## 2021-04-30 RX ORDER — BENZONATATE 100 MG/1
200 CAPSULE ORAL 2 TIMES DAILY PRN
Status: DISCONTINUED | OUTPATIENT
Start: 2021-04-30 | End: 2021-05-06 | Stop reason: HOSPADM

## 2021-04-30 RX ORDER — PROMETHAZINE HYDROCHLORIDE 25 MG/1
12.5 TABLET ORAL EVERY 6 HOURS PRN
Status: DISCONTINUED | OUTPATIENT
Start: 2021-04-30 | End: 2021-05-06 | Stop reason: HOSPADM

## 2021-04-30 RX ORDER — MONTELUKAST SODIUM 10 MG/1
10 TABLET ORAL NIGHTLY
Status: DISCONTINUED | OUTPATIENT
Start: 2021-05-01 | End: 2021-05-06 | Stop reason: HOSPADM

## 2021-04-30 RX ORDER — SODIUM CHLORIDE 9 MG/ML
INJECTION, SOLUTION INTRAVENOUS CONTINUOUS
Status: DISCONTINUED | OUTPATIENT
Start: 2021-04-30 | End: 2021-05-04

## 2021-04-30 RX ORDER — ATORVASTATIN CALCIUM 10 MG/1
10 TABLET, FILM COATED ORAL NIGHTLY
Status: DISCONTINUED | OUTPATIENT
Start: 2021-05-01 | End: 2021-05-06 | Stop reason: HOSPADM

## 2021-04-30 RX ORDER — POTASSIUM CHLORIDE 7.45 MG/ML
10 INJECTION INTRAVENOUS PRN
Status: DISCONTINUED | OUTPATIENT
Start: 2021-04-30 | End: 2021-05-06 | Stop reason: HOSPADM

## 2021-04-30 RX ORDER — ONDANSETRON 2 MG/ML
4 INJECTION INTRAMUSCULAR; INTRAVENOUS EVERY 6 HOURS PRN
Status: DISCONTINUED | OUTPATIENT
Start: 2021-04-30 | End: 2021-05-06 | Stop reason: HOSPADM

## 2021-04-30 RX ORDER — BUDESONIDE AND FORMOTEROL FUMARATE DIHYDRATE 160; 4.5 UG/1; UG/1
2 AEROSOL RESPIRATORY (INHALATION) 2 TIMES DAILY
Status: DISCONTINUED | OUTPATIENT
Start: 2021-05-01 | End: 2021-05-01

## 2021-04-30 RX ORDER — OLMESARTAN MEDOXOMIL, AMLODIPINE AND HYDROCHLOROTHIAZIDE TABLET 40/10/25 MG 40; 10; 25 MG/1; MG/1; MG/1
1 TABLET ORAL DAILY
Status: DISCONTINUED | OUTPATIENT
Start: 2021-05-01 | End: 2021-05-01 | Stop reason: CLARIF

## 2021-04-30 RX ORDER — MORPHINE SULFATE 2 MG/ML
2 INJECTION, SOLUTION INTRAMUSCULAR; INTRAVENOUS
Status: DISCONTINUED | OUTPATIENT
Start: 2021-04-30 | End: 2021-05-01

## 2021-04-30 RX ORDER — HYDROCODONE BITARTRATE AND ACETAMINOPHEN 5; 325 MG/1; MG/1
1 TABLET ORAL EVERY 6 HOURS PRN
Status: DISCONTINUED | OUTPATIENT
Start: 2021-04-30 | End: 2021-05-03

## 2021-04-30 RX ORDER — POTASSIUM CHLORIDE 20 MEQ/1
40 TABLET, EXTENDED RELEASE ORAL PRN
Status: DISCONTINUED | OUTPATIENT
Start: 2021-04-30 | End: 2021-05-06 | Stop reason: HOSPADM

## 2021-04-30 RX ORDER — OYSTER SHELL CALCIUM WITH VITAMIN D 500; 200 MG/1; [IU]/1
1 TABLET, FILM COATED ORAL DAILY
Status: DISCONTINUED | OUTPATIENT
Start: 2021-05-01 | End: 2021-05-01

## 2021-04-30 RX ORDER — FLUTICASONE PROPIONATE 50 MCG
1 SPRAY, SUSPENSION (ML) NASAL DAILY
Status: DISCONTINUED | OUTPATIENT
Start: 2021-05-01 | End: 2021-05-06 | Stop reason: HOSPADM

## 2021-04-30 RX ORDER — MORPHINE SULFATE 4 MG/ML
4 INJECTION, SOLUTION INTRAMUSCULAR; INTRAVENOUS
Status: DISCONTINUED | OUTPATIENT
Start: 2021-04-30 | End: 2021-05-06 | Stop reason: HOSPADM

## 2021-04-30 RX ORDER — SODIUM CHLORIDE 9 MG/ML
25 INJECTION, SOLUTION INTRAVENOUS PRN
Status: DISCONTINUED | OUTPATIENT
Start: 2021-04-30 | End: 2021-05-06 | Stop reason: HOSPADM

## 2021-04-30 RX ORDER — FLUTICASONE FUROATE AND VILANTEROL 100; 25 UG/1; UG/1
2 POWDER RESPIRATORY (INHALATION) DAILY
Status: DISCONTINUED | OUTPATIENT
Start: 2021-05-01 | End: 2021-05-01 | Stop reason: CLARIF

## 2021-04-30 RX ORDER — PIOGLITAZONE HCL AND METFORMIN HCL 500; 15 MG/1; MG/1
1 TABLET ORAL DAILY
Status: DISCONTINUED | OUTPATIENT
Start: 2021-05-01 | End: 2021-05-01 | Stop reason: CLARIF

## 2021-04-30 RX ORDER — SODIUM CHLORIDE 0.9 % (FLUSH) 0.9 %
5-40 SYRINGE (ML) INJECTION EVERY 12 HOURS SCHEDULED
Status: DISCONTINUED | OUTPATIENT
Start: 2021-04-30 | End: 2021-05-06 | Stop reason: HOSPADM

## 2021-04-30 RX ORDER — DOXEPIN HYDROCHLORIDE 25 MG/1
50 CAPSULE ORAL NIGHTLY
Status: DISCONTINUED | OUTPATIENT
Start: 2021-04-30 | End: 2021-05-06 | Stop reason: HOSPADM

## 2021-04-30 RX ORDER — ALBUTEROL SULFATE 90 UG/1
2 AEROSOL, METERED RESPIRATORY (INHALATION) 4 TIMES DAILY PRN
Status: DISCONTINUED | OUTPATIENT
Start: 2021-04-30 | End: 2021-05-06 | Stop reason: HOSPADM

## 2021-04-30 RX ADMIN — HYDROCODONE BITARTRATE AND ACETAMINOPHEN 1 TABLET: 5; 325 TABLET ORAL at 18:40

## 2021-04-30 ASSESSMENT — ENCOUNTER SYMPTOMS
DIARRHEA: 0
NAUSEA: 0
SHORTNESS OF BREATH: 0
VOMITING: 0
ABDOMINAL PAIN: 0

## 2021-04-30 ASSESSMENT — PAIN SCALES - GENERAL: PAINLEVEL_OUTOF10: 10

## 2021-04-30 NOTE — ED NOTES
Orders completed, pt placed back in waiting room to await results and update from provider.         Etta Walden RN  92/98/60 [id number]

## 2021-04-30 NOTE — ED PROVIDER NOTES
[address] [facility]        Pt Name: Nadege Becker  MRN: [id number]  [place] 1935  Date of evaluation: 4/30/2021  Provider: Sita Laird PA-C  PCP: Yasmine Rivera MD     I have seen and evaluated this patient with my supervising physician Giuliana Núñez MD.    [address] [facility]       Chief Complaint   Patient presents with    Fall     Pt states she was turning the light on at home, fell forward hitting left knee and right shoulder, slipped over slippers, no LOC, landed on head, 81 mg ASA daily. (Dr. Rudolph Arlene mother)       HISTORY OF PRESENT ILLNESS   (Location, Timing/Onset, Context/Setting, Quality, Duration, Modifying Factors, Severity, Associated Signs and Symptoms)  Note limiting factors. Nadege Becker is a 80 y.o. female patient presents emergency department for evaluation of slip and fall. Patient states she was turning on the light in her home when she fell forward. Patient states she hit her face and forehead on the ground. Patient states she fell onto both knees but is having significant pain and swelling to the left knee. Patient states she feels she cannot move it very well. Patient is also having pain to the posterior right shoulder. Patient denies any loss of consciousness. She takes a baby aspirin daily. She states she had a bloody nose for a short period of time however it is not currently bleeding. She denies any current headache. She states she does have pain to the nasal bridge. Nursing Notes were all reviewed and agreed with or any disagreements were addressed in the HPI. REVIEW OF SYSTEMS    (2-9 systems for level 4, 10 or more for level 5)     Review of Systems   Constitutional: Negative for fatigue and fever. HENT: Positive for nosebleeds. Eyes: Negative for visual disturbance. Respiratory: Negative for shortness of breath. Cardiovascular: Negative for chest pain. UNITS CAPS    Take 1 capsule by mouth daily    CHOLECALCIFEROL (VITAMIN D3) 50 MCG (2000 UT) TABS    Take 1 tablet by mouth daily    DENTURE CARE PRODUCTS (POLIDENT 3 MINUTE) TBEF    1 tablet by Does not apply route daily    DOXEPIN (SINEQUAN) 50 MG CAPSULE    Take 1 capsule by mouth nightly    FLUTICASONE (FLONASE) 50 MCG/ACT NASAL SPRAY    1 spray by Each Nostril route daily    FLUTICASONE-VILANTEROL (BREO ELLIPTA) 100-25 MCG/INH AEPB INHALER    Inhale 2 puffs into the lungs daily    LANCETS MISC    1 each by Does not apply route 2 times daily    MONTELUKAST (SINGULAIR) 10 MG TABLET    Take 1 tablet by mouth daily    MULTIPLE VITAMIN (MULTIVITAMIN PO)    Take 1 tablet by mouth daily. OLMESARTAN-AMLODIPINE-HCTZ 40-10-25 MG TABS    Take 1 tablet by mouth daily    OMEPRAZOLE (PRILOSEC) 40 MG DELAYED RELEASE CAPSULE    Take 1 capsule by mouth daily    PIOGLITAZONE-METFORMIN (ACTOPLUS MET)  MG PER TABLET    Take 1 tablet by mouth daily         ALLERGIES     Percocet [oxycodone-acetaminophen]    FAMILYHISTORY       Family History   Problem Relation Age of Onset    Arthritis Mother           SOCIAL HISTORY       Social History     Tobacco Use    Smoking status: Never Smoker    Smokeless tobacco: Never Used   Substance Use Topics    Alcohol use: No    Drug use: No       SCREENINGS             PHYSICAL EXAM    (up to 7 for level 4, 8 or more for level 5)     ED Triage Vitals [04/30/21 1806]   BP Temp Temp src Pulse Resp SpO2 Height Weight   (!) 154/80 97.5 °F (36.4 °C) -- 82 16 96 % -- 113 lb (51.3 kg)       Physical Exam  Vitals signs and nursing note reviewed. Constitutional:       General: She is not in acute distress. Appearance: Normal appearance. She is well-developed. She is not ill-appearing, toxic-appearing or diaphoretic. HENT:      Head: Normocephalic and atraumatic. No raccoon eyes, Lawrence's sign, right periorbital erythema or left periorbital erythema. Jaw:  There is normal jaw occlusion. Right Ear: Tympanic membrane, ear canal and external ear normal.      Left Ear: Tympanic membrane, ear canal and external ear normal.      Nose: Nasal tenderness present. No nasal deformity or septal deviation. Right Nostril: No epistaxis or septal hematoma. Left Nostril: No epistaxis or septal hematoma. Right Sinus: No maxillary sinus tenderness or frontal sinus tenderness. Left Sinus: No maxillary sinus tenderness or frontal sinus tenderness. Comments: Evidence of previous bilateral epistaxis but no current epistaxis. Mouth/Throat:      Mouth: Mucous membranes are moist.      Pharynx: Oropharynx is clear. Eyes:      General:         Right eye: No discharge. Left eye: No discharge. Conjunctiva/sclera: Conjunctivae normal.      Pupils: Pupils are equal, round, and reactive to light. Neck:      Musculoskeletal: Normal range of motion and neck supple. Cardiovascular:      Rate and Rhythm: Normal rate and regular rhythm. Heart sounds: Normal heart sounds. No murmur. No gallop. Pulmonary:      Effort: Pulmonary effort is normal. No respiratory distress. Breath sounds: Normal breath sounds. No wheezing, rhonchi or rales. Musculoskeletal: Normal range of motion. Right shoulder: She exhibits bony tenderness. She exhibits normal range of motion, no swelling and no deformity. Left shoulder: Normal.      Right hip: Normal.      Left hip: Normal.      Right knee: Normal.      Left knee: She exhibits swelling. She exhibits normal range of motion. Tenderness found. Right ankle: Normal.      Left ankle: Normal.      Cervical back: Normal.      Thoracic back: Normal.      Lumbar back: Normal.        Arms:       Comments: Bruising and effusion noted to anterior knee   Skin:     General: Skin is warm and dry. Capillary Refill: Capillary refill takes less than 2 seconds. Coloration: Skin is not jaundiced or pale.    Neurological: DEPARTMENT COURSE and DIFFERENTIAL DIAGNOSIS/MDM:   Vitals:    Vitals:    04/30/21 1806   BP: (!) 154/80   Pulse: 82   Resp: 16   Temp: 97.5 °F (36.4 °C)   SpO2: 96%   Weight: 113 lb (51.3 kg)       Patient was given the following medications:  Medications   HYDROcodone-acetaminophen (NORCO) 5-325 MG per tablet 1 tablet (1 tablet Oral Given 4/30/21 1840)         Patient presents emergency department for evaluation of possible injury after a fall. Patient has left knee pain and swelling. Patient has full range of motion with discomfort. No open laceration or abrasion. Patient has tenderness over the anterior patella. Patient does not have any tenderness over we will plateau or distal femur. No tenderness to bilateral hips. No pain with leg roll. No pain to lower leg. Patient has some tenderness to the posterior aspect of the right shoulder. No tenderness over the clavicle or AC joint. Patient has full range of motion. No tenderness to cervical thoracic or lumbar spine. TMs are normal bilaterally without evidence of hemotympanum. Patient has bruising and possible deformity to nasal bridge. She has tenderness on examination. No septal hematoma noted. Evidence of previous epistaxis but no current epistaxis. Patient has some tenderness over anterior forehead but no bruising or step-off. Patient was given Norco for pain. X-ray of the left knee shows transverse patellar fracture. CT of the knee shows comminuted and distracted patellar fracture. No additional associated fractures or abnormality of the knee hardware. CT shows no acute intracranial bleed. Cervical spine has no acute bony fracture. Patient does have fractures of bilateral nasal bone and septum. Slightly deviated to the left. Patient does not want us to try and straighten it. She is able to breathe normally. Again no septal hematoma.   I spoke with Dr. Ann Marie Zabala with orthopedics who states that patient is able to go home if it is safe to do so with knee immobilizer and Ace wrap. Family member state that their house has a lot of stairs and they do not feel safe taking the patient home. Patient will be admitted to Dr. Sandra Cooper as he is currently admitting patients for Dr. Molly Jimenez. Patient was given Ace wrap and knee immobilizer here in the emergency department. Dr. Sandra Cooper accepts the patient for admission. FINAL IMPRESSION      1. Closed fracture of nasal bone, initial encounter    2. Closed displaced fracture of left patella, unspecified fracture morphology, initial encounter    3. Fall from slip, trip, or stumble, initial encounter          DISPOSITION/PLAN   DISPOSITION Decision To Admit 04/30/2021 09:06:02 PM      PATIENT REFERREDTO:  No follow-up provider specified.     DISCHARGE MEDICATIONS:  New Prescriptions    No medications on file       DISCONTINUED MEDICATIONS:  Discontinued Medications    No medications on file              (Please note that portions of this note were completed with a voice recognition program.  Efforts were made to edit the dictations but occasionally words are mis-transcribed.)    Erik Rudd PA-C (electronically signed)            Erik Rudd PA-C  04/30/21 0049

## 2021-05-01 PROBLEM — M75.101 RIGHT ROTATOR CUFF TEAR ARTHROPATHY: Status: ACTIVE | Noted: 2021-05-01

## 2021-05-01 PROBLEM — M12.811 RIGHT ROTATOR CUFF TEAR ARTHROPATHY: Status: ACTIVE | Noted: 2021-05-01

## 2021-05-01 LAB
A/G RATIO: 1.6 (ref 1.1–2.2)
ALBUMIN SERPL-MCNC: 4 G/DL (ref 3.4–5)
ALP BLD-CCNC: 61 U/L (ref 40–129)
ALT SERPL-CCNC: 15 U/L (ref 10–40)
ANION GAP SERPL CALCULATED.3IONS-SCNC: 11 MMOL/L (ref 3–16)
AST SERPL-CCNC: 19 U/L (ref 15–37)
BASOPHILS ABSOLUTE: 0 K/UL (ref 0–0.2)
BASOPHILS RELATIVE PERCENT: 0.4 %
BILIRUB SERPL-MCNC: 0.3 MG/DL (ref 0–1)
BUN BLDV-MCNC: 19 MG/DL (ref 7–20)
CALCIUM SERPL-MCNC: 9.4 MG/DL (ref 8.3–10.6)
CHLORIDE BLD-SCNC: 96 MMOL/L (ref 99–110)
CO2: 27 MMOL/L (ref 21–32)
CREAT SERPL-MCNC: 0.6 MG/DL (ref 0.6–1.2)
EOSINOPHILS ABSOLUTE: 0.1 K/UL (ref 0–0.6)
EOSINOPHILS RELATIVE PERCENT: 1.1 %
GFR AFRICAN AMERICAN: >60
GFR NON-AFRICAN AMERICAN: >60
GLOBULIN: 2.5 G/DL
GLUCOSE BLD-MCNC: 119 MG/DL (ref 70–99)
GLUCOSE BLD-MCNC: 138 MG/DL (ref 70–99)
GLUCOSE BLD-MCNC: 184 MG/DL (ref 70–99)
GLUCOSE BLD-MCNC: 191 MG/DL (ref 70–99)
HCT VFR BLD CALC: 35.4 % (ref 36–48)
HEMOGLOBIN: 11.6 G/DL (ref 12–16)
LYMPHOCYTES ABSOLUTE: 1.2 K/UL (ref 1–5.1)
LYMPHOCYTES RELATIVE PERCENT: 10.8 %
MCH RBC QN AUTO: 29 PG (ref 26–34)
MCHC RBC AUTO-ENTMCNC: 32.6 G/DL (ref 31–36)
MCV RBC AUTO: 89 FL (ref 80–100)
MONOCYTES ABSOLUTE: 0.9 K/UL (ref 0–1.3)
MONOCYTES RELATIVE PERCENT: 7.9 %
NEUTROPHILS ABSOLUTE: 8.8 K/UL (ref 1.7–7.7)
NEUTROPHILS RELATIVE PERCENT: 79.8 %
PDW BLD-RTO: 14.4 % (ref 12.4–15.4)
PERFORMED ON: ABNORMAL
PLATELET # BLD: 221 K/UL (ref 135–450)
PMV BLD AUTO: 8 FL (ref 5–10.5)
POTASSIUM REFLEX MAGNESIUM: 3.9 MMOL/L (ref 3.5–5.1)
RBC # BLD: 3.98 M/UL (ref 4–5.2)
SARS-COV-2, NAAT: NOT DETECTED
SODIUM BLD-SCNC: 134 MMOL/L (ref 136–145)
TOTAL PROTEIN: 6.5 G/DL (ref 6.4–8.2)
WBC # BLD: 11 K/UL (ref 4–11)

## 2021-05-01 PROCEDURE — 1200000000 HC SEMI PRIVATE

## 2021-05-01 PROCEDURE — 6370000000 HC RX 637 (ALT 250 FOR IP): Performed by: INTERNAL MEDICINE

## 2021-05-01 PROCEDURE — 2580000003 HC RX 258: Performed by: INTERNAL MEDICINE

## 2021-05-01 PROCEDURE — 99222 1ST HOSP IP/OBS MODERATE 55: CPT | Performed by: STUDENT IN AN ORGANIZED HEALTH CARE EDUCATION/TRAINING PROGRAM

## 2021-05-01 PROCEDURE — 94761 N-INVAS EAR/PLS OXIMETRY MLT: CPT

## 2021-05-01 PROCEDURE — 87635 SARS-COV-2 COVID-19 AMP PRB: CPT

## 2021-05-01 PROCEDURE — 94640 AIRWAY INHALATION TREATMENT: CPT

## 2021-05-01 PROCEDURE — 80053 COMPREHEN METABOLIC PANEL: CPT

## 2021-05-01 PROCEDURE — 99222 1ST HOSP IP/OBS MODERATE 55: CPT | Performed by: ORTHOPAEDIC SURGERY

## 2021-05-01 PROCEDURE — 85025 COMPLETE CBC W/AUTO DIFF WBC: CPT

## 2021-05-01 RX ORDER — AMLODIPINE BESYLATE 5 MG/1
10 TABLET ORAL DAILY
Status: DISCONTINUED | OUTPATIENT
Start: 2021-05-01 | End: 2021-05-06 | Stop reason: HOSPADM

## 2021-05-01 RX ORDER — CARBOXYMETHYLCELLULOSE SODIUM AND GLYCERIN 5; 9 MG/ML; MG/ML
1 SOLUTION/ DROPS OPHTHALMIC NIGHTLY
COMMUNITY
Start: 2021-04-30

## 2021-05-01 RX ORDER — PIOGLITAZONEHYDROCHLORIDE 15 MG/1
15 TABLET ORAL DAILY
Status: DISCONTINUED | OUTPATIENT
Start: 2021-05-01 | End: 2021-05-06 | Stop reason: HOSPADM

## 2021-05-01 RX ORDER — HYDROCHLOROTHIAZIDE 25 MG/1
25 TABLET ORAL DAILY
Status: DISCONTINUED | OUTPATIENT
Start: 2021-05-01 | End: 2021-05-01 | Stop reason: SDUPTHER

## 2021-05-01 RX ORDER — LOSARTAN POTASSIUM 100 MG/1
100 TABLET ORAL DAILY
Status: DISCONTINUED | OUTPATIENT
Start: 2021-05-01 | End: 2021-05-06 | Stop reason: HOSPADM

## 2021-05-01 RX ORDER — PANTOPRAZOLE SODIUM 40 MG/1
40 TABLET, DELAYED RELEASE ORAL
Status: DISCONTINUED | OUTPATIENT
Start: 2021-05-01 | End: 2021-05-06 | Stop reason: HOSPADM

## 2021-05-01 RX ORDER — BIOTIN 1 MG
1 TABLET ORAL DAILY
COMMUNITY

## 2021-05-01 RX ORDER — CALCIUM CARBONATE 200(500)MG
500 TABLET,CHEWABLE ORAL 3 TIMES DAILY PRN
Status: DISCONTINUED | OUTPATIENT
Start: 2021-05-01 | End: 2021-05-06 | Stop reason: HOSPADM

## 2021-05-01 RX ORDER — INSULIN LISPRO 100 [IU]/ML
0-3 INJECTION, SOLUTION INTRAVENOUS; SUBCUTANEOUS NIGHTLY
Status: DISCONTINUED | OUTPATIENT
Start: 2021-05-01 | End: 2021-05-03

## 2021-05-01 RX ORDER — AMLODIPINE BESYLATE 5 MG/1
10 TABLET ORAL DAILY
Status: DISCONTINUED | OUTPATIENT
Start: 2021-05-01 | End: 2021-05-01 | Stop reason: SDUPTHER

## 2021-05-01 RX ORDER — HYDROCHLOROTHIAZIDE 25 MG/1
25 TABLET ORAL DAILY
Status: DISCONTINUED | OUTPATIENT
Start: 2021-05-01 | End: 2021-05-06 | Stop reason: HOSPADM

## 2021-05-01 RX ORDER — OYSTER SHELL CALCIUM WITH VITAMIN D 500; 200 MG/1; [IU]/1
1 TABLET, FILM COATED ORAL 2 TIMES DAILY
Status: DISCONTINUED | OUTPATIENT
Start: 2021-05-01 | End: 2021-05-06 | Stop reason: HOSPADM

## 2021-05-01 RX ORDER — INSULIN LISPRO 100 [IU]/ML
0-6 INJECTION, SOLUTION INTRAVENOUS; SUBCUTANEOUS
Status: DISCONTINUED | OUTPATIENT
Start: 2021-05-02 | End: 2021-05-03

## 2021-05-01 RX ORDER — BUDESONIDE AND FORMOTEROL FUMARATE DIHYDRATE 160; 4.5 UG/1; UG/1
2 AEROSOL RESPIRATORY (INHALATION) 2 TIMES DAILY
Status: DISCONTINUED | OUTPATIENT
Start: 2021-05-01 | End: 2021-05-06 | Stop reason: HOSPADM

## 2021-05-01 RX ORDER — LANOLIN ALCOHOL/MO/W.PET/CERES
1000 CREAM (GRAM) TOPICAL DAILY
COMMUNITY

## 2021-05-01 RX ORDER — MORPHINE SULFATE 2 MG/ML
0.5 INJECTION, SOLUTION INTRAMUSCULAR; INTRAVENOUS EVERY 4 HOURS PRN
Status: DISCONTINUED | OUTPATIENT
Start: 2021-05-01 | End: 2021-05-06 | Stop reason: HOSPADM

## 2021-05-01 RX ADMIN — DOXEPIN HYDROCHLORIDE 50 MG: 25 CAPSULE ORAL at 20:21

## 2021-05-01 RX ADMIN — Medication 10 ML: at 08:43

## 2021-05-01 RX ADMIN — Medication 2 PUFF: at 09:40

## 2021-05-01 RX ADMIN — Medication 2000 UNITS: at 08:22

## 2021-05-01 RX ADMIN — CALCIUM CARBONATE-VITAMIN D TAB 500 MG-200 UNIT 1 TABLET: 500-200 TAB at 08:23

## 2021-05-01 RX ADMIN — Medication: at 22:00

## 2021-05-01 RX ADMIN — ATORVASTATIN CALCIUM 10 MG: 10 TABLET, FILM COATED ORAL at 00:49

## 2021-05-01 RX ADMIN — ANTACID TABLETS 500 MG: 500 TABLET, CHEWABLE ORAL at 20:21

## 2021-05-01 RX ADMIN — PANTOPRAZOLE SODIUM 40 MG: 40 TABLET, DELAYED RELEASE ORAL at 08:22

## 2021-05-01 RX ADMIN — INSULIN LISPRO 1 UNITS: 100 INJECTION, SOLUTION INTRAVENOUS; SUBCUTANEOUS at 23:19

## 2021-05-01 RX ADMIN — HYDROCHLOROTHIAZIDE 25 MG: 25 TABLET ORAL at 08:24

## 2021-05-01 RX ADMIN — DOXEPIN HYDROCHLORIDE 50 MG: 25 CAPSULE ORAL at 00:49

## 2021-05-01 RX ADMIN — FLUTICASONE PROPIONATE 1 SPRAY: 50 SPRAY, METERED NASAL at 08:42

## 2021-05-01 RX ADMIN — Medication: at 01:28

## 2021-05-01 RX ADMIN — HYDROCODONE BITARTRATE AND ACETAMINOPHEN 1 TABLET: 5; 325 TABLET ORAL at 15:21

## 2021-05-01 RX ADMIN — Medication 2 PUFF: at 18:23

## 2021-05-01 RX ADMIN — HYDROCODONE BITARTRATE AND ACETAMINOPHEN 1 TABLET: 5; 325 TABLET ORAL at 00:49

## 2021-05-01 RX ADMIN — ATORVASTATIN CALCIUM 10 MG: 10 TABLET, FILM COATED ORAL at 20:21

## 2021-05-01 RX ADMIN — CALCIUM CARBONATE-VITAMIN D TAB 500 MG-200 UNIT 1 TABLET: 500-200 TAB at 00:49

## 2021-05-01 RX ADMIN — CALCIUM CARBONATE-VITAMIN D TAB 500 MG-200 UNIT 1 TABLET: 500-200 TAB at 20:21

## 2021-05-01 ASSESSMENT — PAIN DESCRIPTION - FREQUENCY: FREQUENCY: INTERMITTENT

## 2021-05-01 ASSESSMENT — ENCOUNTER SYMPTOMS
EYE PAIN: 0
NAUSEA: 0
EYE REDNESS: 0
VOMITING: 0
SHORTNESS OF BREATH: 0
COUGH: 0

## 2021-05-01 ASSESSMENT — PAIN DESCRIPTION - PAIN TYPE
TYPE: ACUTE PAIN
TYPE: ACUTE PAIN

## 2021-05-01 ASSESSMENT — PAIN DESCRIPTION - DESCRIPTORS: DESCRIPTORS: THROBBING

## 2021-05-01 ASSESSMENT — PAIN SCALES - GENERAL: PAINLEVEL_OUTOF10: 6

## 2021-05-01 ASSESSMENT — PAIN DESCRIPTION - LOCATION
LOCATION: KNEE
LOCATION: KNEE

## 2021-05-01 NOTE — ED PROVIDER NOTES
I independently performed a history and physical on [facility]. All diagnostic, treatment, and disposition decisions were made by myself in conjunction with the advanced practice provider. Briefly, this is a 80 y.o. female here for mechanical fall. Patient states that this evening she was turning on the light when she slipped because she was wearing slippery shoes. She landed forward on her nose and her left knee. She had immediate pain and swelling to the left knee with difficulty bearing weight on the left leg. Her nose also hurt but she did not have any epistaxis. She did not lose consciousness. Currently she is having pain in her left knee only with flexion or extension. She does have a history of hardware in this left knee. She is on baby aspirin therapy only. Does have a history of diabetes. On exam the patient is resting comfortably. She has nasal bridge tenderness without obvious deformity. There is no nasal septal hematoma noted. Extraocular movements are intact without proptosis. No midline C-spine tenderness or step-off. Lungs clear throughout. Cardiac regular rate and rhythm. No abdominal tenderness. Able to range her shoulders without difficulty. She does have ecchymosis with swelling over the left knee with a midline healed surgical scar. She does have pain when trying to extend the left knee. The right lower extremity and left lower extremity are warm to the touch. CT KNEE LEFT WO CONTRAST   Final Result   1. Acute comminuted distracted fracture of the mid patella. 2. Status post left total knee arthroplasty. 3. Moderate joint effusion. XR SHOULDER RIGHT (MIN 2 VIEWS)   Final Result      XR KNEE LEFT (3 VIEWS)   Final Result   Acute transverse fracture through the midbody of the patella. CT Cervical Spine WO Contrast   Final Result   1. No acute traumatic intracranial abnormality. 2. No traumatic abnormality of the cervical spine.    3. Bilateral nasal bone fractures with deviation to the left         CT FACIAL BONES WO CONTRAST   Final Result   1. No acute traumatic intracranial abnormality. 2. No traumatic abnormality of the cervical spine. 3. Bilateral nasal bone fractures with deviation to the left         CT Head WO Contrast   Final Result   1. No acute traumatic intracranial abnormality. 2. No traumatic abnormality of the cervical spine. 3. Bilateral nasal bone fractures with deviation to the left           Labs Reviewed - No data to display  Medications   HYDROcodone-acetaminophen (NORCO) 5-325 MG per tablet 1 tablet (1 tablet Oral Given 4/30/21 1840)     Course and MDM:  Patient is 77-year-old female presenting to the emergency room for mechanical fall on baby aspirin therapy. On arrival she is hemodynamically stable. Trauma exam as above is warranting the above imaging showing a closed nasal bridge fracture as well as a closed left patellar fracture at the site of prior hardware. The left lower extremity is neurovascularly intact. Patient does live in a home with a large amount of stairs and is having trouble ambulating on this leg. She was offered admission for PT OT and operative intervention versus seeing Ortho as an outpatient for possible orthopedic intervention. She prefers to stay in the hospital at this time and family agrees. Patient Referrals:  No follow-up provider specified. Discharge Medications:  New Prescriptions    No medications on file       FINAL IMPRESSION  1. Closed fracture of nasal bone, initial encounter    2. Closed displaced fracture of left patella, unspecified fracture morphology, initial encounter    3. Fall from slip, trip, or stumble, initial encounter        Blood pressure (!) 154/80, pulse 82, temperature 97.5 °F (36.4 °C), resp. rate 16, weight 113 lb (51.3 kg), SpO2 96 %, not currently breastfeeding.      For further details of [facility] emergency department encounter, please see documentation by advanced practice provider        Renetta Milian MD  04/30/21 4350

## 2021-05-01 NOTE — PROGRESS NOTES
Progress Note - Dr. Sanam Bonds - Internal Medicine  PCP: Stewart Sanches MD [address] [facility] / [address] [place] [address] [phone]    Hospital Day: 1  Code Status: Full Code  Current Diet: Dietary Nutrition Supplements: Standard High Calorie Oral Supplement  DIET CARB CONTROL;        CC: follow up on medical issues    Subjective: Hair Nguyễn is a 80 y.o. female. She denies problems    Pt complaining of pain to L knee still  As there is no planned procedure, will change diet to [facility] diet  Pt/ot have not yet seen patient    She denies chest pain, denies shortness of breath, denies nausea,  denies emesis. 10 system Review of Systems is reviewed with patient, and pertinent positives are noted in HPI above . Otherwise, Review of systems is negative. I have reviewed the patient's medical and social history in detail and updated the computerized patient record. To recap: She  has a past medical history of Arthritis, Diabetes mellitus ([address] HYPERCHOLESTERAEMIA, Hypertension, and TIA (transient ischaemic attack). . She  has a past surgical history that includes colectomy; joint replacement (04/22/2010); and Total knee arthroplasty (8/19/2010). . She  reports that she has never smoked. She has never used smokeless tobacco. She reports that she does not drink alcohol or use drugs. .        Active Hospital Problems    Diagnosis Date Noted    Patella fracture [S82.009A] 04/30/2021    Nasal fracture [S02. 2XXA] 04/30/2021    Closed comminuted fracture of left patella, initial encounter Pilar Le 04/30/2021    Controlled type 2 diabetes mellitus without complication, without long-term current use of insulin ([address] [E11.9] 04/22/2010    HTN (hypertension) [I10] 04/22/2010    Hyperlipidemia [E78.5] 04/22/2010       Current Facility-Administered Medications: pioglitazone (ACTOS) tablet 15 mg, 15 mg, Oral, Daily **AND** metFORMIN (GLUCOPHAGE) tablet 500 mg, 500 mg, Oral, Daily with breakfast  losartan (COZAAR) tablet Vw)    Result Date: 4/22/2021  EXAMINATION: TWO XRAY VIEWS OF THE CHEST 4/22/2021 3:09 pm COMPARISON: 09/12/2019 HISTORY: ORDERING SYSTEM PROVIDED HISTORY: Chronic cough TECHNOLOGIST PROVIDED HISTORY: Reason for Exam: Chronic cough x 1 month Acuity: Acute Type of Exam: Initial FINDINGS: Heart size is normal.  Some increased opacity is present in the lower right hilum. Appearance is more prominent than on the prior study. No acute airspace disease present. No abnormal vascular congestion. Mild to moderate bullous changes. No pleural effusion. Changes of cuff arthropathy in the right shoulder consistent with full-thickness rotator cuff tear. Prominent increased density in the lower right hilum. Appearance could be due to a perihilar mass but is probably due to calcified lymph nodes. RECOMMENDATION: CT chest with contrast recommended for further evaluation. Xr Shoulder Right (min 2 Views)    Result Date: 4/30/2021  EXAMINATION: THREE XRAY VIEWS OF THE RIGHT SHOULDER 4/30/2021 6:30 pm COMPARISON: None. HISTORY: ORDERING SYSTEM PROVIDED HISTORY: Injury TECHNOLOGIST PROVIDED HISTORY: Reason for exam:->Injury Reason for Exam: Fall Acuity: Acute Type of Exam: Initial FINDINGS: There are severe degenerative changes in the glenohumeral joint. There is elevation of the humeral head consistent with chronic rotator cuff tear. There is no evidence of fracture, dislocation, or other acute osseous abnormality. IMPRESSION No acute osseous abnormality. Xr Knee Left (3 Views)    Result Date: 4/30/2021  EXAMINATION: THREE XRAY VIEWS OF THE LEFT KNEE 4/30/2021 6:30 pm COMPARISON: None. HISTORY: ORDERING SYSTEM PROVIDED HISTORY: fall, pain with movement, bruising/swelling TECHNOLOGIST PROVIDED HISTORY: Reason for exam:->fall, pain with movement, bruising/swelling Reason for Exam: Fall Acuity: Acute Type of Exam: Initial FINDINGS: The patient is status post total knee arthroplasty.   There is a transverse fracture through the midbody of the patella. There is mild displacement. There is an associated joint effusion. Acute transverse fracture through the midbody of the patella. Ct Head Wo Contrast    Result Date: 4/30/2021  EXAMINATION: CT OF THE HEAD WITHOUT CONTRAST; CT OF THE FACE WITHOUT CONTRAST; CT OF THE CERVICAL SPINE WITHOUT CONTRAST  4/30/2021 6:40 pm TECHNIQUE: CT of the head was performed without the administration of intravenous contrast. Dose modulation, iterative reconstruction, and/or weight based adjustment of the mA/kV was utilized to reduce the radiation dose to as low as reasonably achievable.; CT of the face was performed without the administration of intravenous contrast. Multiplanar reformatted images are provided for review. Dose modulation, iterative reconstruction, and/or weight based adjustment of the mA/kV was utilized to reduce the radiation dose to as low as reasonably achievable.; CT of the cervical spine was performed without the administration of intravenous contrast. Multiplanar reformatted images are provided for review. Dose modulation, iterative reconstruction, and/or weight based adjustment of the mA/kV was utilized to reduce the radiation dose to as low as reasonably achievable. COMPARISON: None. HISTORY: ORDERING SYSTEM PROVIDED HISTORY: fall, bloody nose, pain to nasal bridge TECHNOLOGIST PROVIDED HISTORY: Reason for exam:->fall, bloody nose, pain to nasal bridge Has a \"code stroke\" or \"stroke alert\" been called? ->No Decision Support Exception - unselect if not a suspected or confirmed emergency medical condition->Emergency Medical Condition (MA) Reason for Exam: Fall (Pt states she was turning the light on at home, fell forward hitting left knee and right shoulder, slipped over slippers, no LOC, landed on head, 81 mg ASA daily. (Dr. Jessica Cooper mother)) FINDINGS: . BRAIN AND VENTRICLES: The ventricles are midline and symmetric.  There is no evidence of intracranial hemorrhage, focal mass lesion, or other acute intracranial abnormality. SKULL: There is no evidence of calvarial fracture. C-SPINE: Vertebral body height and alignment is maintained. There is no evidence of fracture or other acute osseous abnormality. There is moderate to severe multilevel degenerative change. FACIAL BONES: There are minimally displaced fractures of the nasal bones with deviation to the left. The nasal septum is deviated as well. 1. No acute traumatic intracranial abnormality. 2. No traumatic abnormality of the cervical spine. 3. Bilateral nasal bone fractures with deviation to the left     Ct Facial Bones Wo Contrast    Result Date: 4/30/2021  EXAMINATION: CT OF THE HEAD WITHOUT CONTRAST; CT OF THE FACE WITHOUT CONTRAST; CT OF THE CERVICAL SPINE WITHOUT CONTRAST  4/30/2021 6:40 pm TECHNIQUE: CT of the head was performed without the administration of intravenous contrast. Dose modulation, iterative reconstruction, and/or weight based adjustment of the mA/kV was utilized to reduce the radiation dose to as low as reasonably achievable.; CT of the face was performed without the administration of intravenous contrast. Multiplanar reformatted images are provided for review. Dose modulation, iterative reconstruction, and/or weight based adjustment of the mA/kV was utilized to reduce the radiation dose to as low as reasonably achievable.; CT of the cervical spine was performed without the administration of intravenous contrast. Multiplanar reformatted images are provided for review. Dose modulation, iterative reconstruction, and/or weight based adjustment of the mA/kV was utilized to reduce the radiation dose to as low as reasonably achievable. COMPARISON: None. HISTORY: ORDERING SYSTEM PROVIDED HISTORY: fall, bloody nose, pain to nasal bridge TECHNOLOGIST PROVIDED HISTORY: Reason for exam:->fall, bloody nose, pain to nasal bridge Has a \"code stroke\" or \"stroke alert\" been called? ->No Decision Support Exception - unselect if not a suspected or confirmed emergency medical condition->Emergency Medical Condition (MA) Reason for Exam: Fall (Pt states she was turning the light on at home, fell forward hitting left knee and right shoulder, slipped over slippers, no LOC, landed on head, 81 mg ASA daily. (Dr. Dickson Lynn mother)) FINDINGS: . BRAIN AND VENTRICLES: The ventricles are midline and symmetric. There is no evidence of intracranial hemorrhage, focal mass lesion, or other acute intracranial abnormality. SKULL: There is no evidence of calvarial fracture. C-SPINE: Vertebral body height and alignment is maintained. There is no evidence of fracture or other acute osseous abnormality. There is moderate to severe multilevel degenerative change. FACIAL BONES: There are minimally displaced fractures of the nasal bones with deviation to the left. The nasal septum is deviated as well. 1. No acute traumatic intracranial abnormality. 2. No traumatic abnormality of the cervical spine. 3. Bilateral nasal bone fractures with deviation to the left     Ct Chest W Contrast    Result Date: 4/22/2021  EXAMINATION: CT OF THE CHEST WITH CONTRAST 4/22/2021 7:40 pm TECHNIQUE: CT of the chest was performed with the administration of intravenous contrast. Multiplanar reformatted images are provided for review. Dose modulation, iterative reconstruction, and/or weight based adjustment of the mA/kV was utilized to reduce the radiation dose to as low as reasonably achievable. COMPARISON: Chest x-ray performed earlier today. HISTORY: ORDERING SYSTEM PROVIDED HISTORY: Exertional dyspnea TECHNOLOGIST PROVIDED HISTORY: Reason for Exam: Dx: Exertional dyspnea R06.00 (ICD-10-CM) Acuity: Acute Type of Exam: Initial FINDINGS: Mediastinum: The thoracic aorta is normal in course and caliber with diffuse atherosclerotic plaque. The heart is not enlarged with scattered coronary vascular calcification and no pericardial effusion.   The main pulmonary artery is normal in caliber. Prominent main pulmonary artery on the right as well as central pulmonary vessels, resulting in the prominent right hilum on earlier chest x-ray. No pathologic hilar or mediastinal adenopathy. Lungs/pleura: No acute infiltrate, consolidation or edema. Multiple pulmonary cysts are noted predominantly on the left. Subsegmental atelectasis in the lower lobes bilaterally, left greater than right. No pleural fluid. The central airways are patent. Upper Abdomen: The visualized upper abdominal viscera are unremarkable. Moderate gastric distension. Soft Tissues/Bones: No acute osseous or soft tissue abnormality. Multilevel osteoarthritic spurring in the thoracic spine. Moderate degenerative change at the right shoulder joint. 1. No evidence of pulmonary mass. Asymmetric prominence of the right pulmonary outflow tract resulting in the prominent right hilum on chest x-ray. Findings suggest possible pulmonary hypertension. 2. No acute pulmonary infiltrate. Subsegmental bibasilar atelectasis. Multiple pulmonary cysts, predominantly on the left. Consider pulmonary consultation and high-resolution CT follow-up for more complete evaluation. 3. Atherosclerotic calcification in the aorta and coronary circulation. Ct Cervical Spine Wo Contrast    Result Date: 4/30/2021  EXAMINATION: CT OF THE HEAD WITHOUT CONTRAST; CT OF THE FACE WITHOUT CONTRAST; CT OF THE CERVICAL SPINE WITHOUT CONTRAST  4/30/2021 6:40 pm TECHNIQUE: CT of the head was performed without the administration of intravenous contrast. Dose modulation, iterative reconstruction, and/or weight based adjustment of the mA/kV was utilized to reduce the radiation dose to as low as reasonably achievable.; CT of the face was performed without the administration of intravenous contrast. Multiplanar reformatted images are provided for review.  Dose modulation, iterative reconstruction, and/or weight based adjustment of the mA/kV was utilized to reduce the radiation dose to as low as reasonably achievable.; CT of the cervical spine was performed without the administration of intravenous contrast. Multiplanar reformatted images are provided for review. Dose modulation, iterative reconstruction, and/or weight based adjustment of the mA/kV was utilized to reduce the radiation dose to as low as reasonably achievable. COMPARISON: None. HISTORY: ORDERING SYSTEM PROVIDED HISTORY: fall, bloody nose, pain to nasal bridge TECHNOLOGIST PROVIDED HISTORY: Reason for exam:->fall, bloody nose, pain to nasal bridge Has a \"code stroke\" or \"stroke alert\" been called? ->No Decision Support Exception - unselect if not a suspected or confirmed emergency medical condition->Emergency Medical Condition (MA) Reason for Exam: Fall (Pt states she was turning the light on at home, fell forward hitting left knee and right shoulder, slipped over slippers, no LOC, landed on head, 81 mg ASA daily. (Dr. Damon Guest mother)) FINDINGS: . BRAIN AND VENTRICLES: The ventricles are midline and symmetric. There is no evidence of intracranial hemorrhage, focal mass lesion, or other acute intracranial abnormality. SKULL: There is no evidence of calvarial fracture. C-SPINE: Vertebral body height and alignment is maintained. There is no evidence of fracture or other acute osseous abnormality. There is moderate to severe multilevel degenerative change. FACIAL BONES: There are minimally displaced fractures of the nasal bones with deviation to the left. The nasal septum is deviated as well. 1. No acute traumatic intracranial abnormality. 2. No traumatic abnormality of the cervical spine.  3. Bilateral nasal bone fractures with deviation to the left     Ct Knee Left Wo Contrast    Result Date: 4/30/2021  EXAMINATION: CT OF THE LEFT KNEE WITHOUT CONTRAST 4/30/2021 7:49 pm TECHNIQUE: CT of the left knee was performed without the administration of intravenous contrast.  Multiplanar reformatted images are provided for review. Dose modulation, iterative reconstruction, and/or weight based adjustment of the mA/kV was utilized to reduce the radiation dose to as low as reasonably achievable. COMPARISON: Left knee radiographs 04/30/2021. HISTORY ORDERING SYSTEM PROVIDED HISTORY: patella fracture seen on xr, tibial plateu fracture? pt fell onto knee TECHNOLOGIST PROVIDED HISTORY: Reason for exam:->patella fracture seen on xr, tibial plateu fracture? pt fell onto knee Decision Support Exception - unselect if not a suspected or confirmed emergency medical condition->Emergency Medical Condition (MA) Reason for Exam: Fall (Pt states she was turning the light on at home, fell forward hitting left knee and right shoulder, slipped over slippers, no LOC, landed on head, 81 mg ASA daily. (Dr. Nelsy Sol mother)) Acuity: Acute Type of Exam: Initial FINDINGS: Bones: There is an acute comminuted distracted fracture of the mid patella. No other fracture identified. No dislocation. Soft Tissue: The extensor mechanism is grossly intact. Anterior subcutaneous edema. No soft tissue gas or foreign body. Atherosclerotic vascular calcifications. Joint:  Status post left total knee arthroplasty. The hardware is appropriately positioned and intact without abnormal interface widening. Moderate joint effusion. 1. Acute comminuted distracted fracture of the mid patella. 2. Status post left total knee arthroplasty. 3. Moderate joint effusion. Assessment and Plan:  Principal Problem:    Patella fracture -Established problem. Stable. Plan: pt/ot to see. Ortho on board  Active Problems:    Controlled type 2 diabetes mellitus without complication, without long-term current use of insulin ([address] -Established problem. Stable. Plan: resume ccc diet, sliding scale, home meds    HTN (hypertension) -Established problem. Stable. 103/62  Plan: cont home meds    Hyperlipidemia  Plan: Continue present orders/plan.      Nasal fracture  Plan: ENT eval pending    Closed comminuted fracture of left patella, initial encounter    Disp - await pt/ot to see what needs are          Espitia Justice  5/1/2021

## 2021-05-01 NOTE — CONSULTS
is breathing ok thorough her nose right now. REVIEW OF SYSTEMS  The following systems were reviewed and revealed the following in addition to any already discussed in the HPI:    PHYSICAL EXAM    GENERAL: No acute distress, alert and oriented, no hoarseness, strong voice  EYES: EOMI, Anti-icteric  HENT:   Head: Normocephalic and atraumatic. Face:  Symmetric, facial nerve intact, no sinus tenderness  Right Ear: Normal external ear, normal external auditory canal, intact tympanic membrane with normal mobility and aerated middle ear  Left Ear: Normal external ear, normal external auditory canal, intact tympanic membrane with normal mobility and aerated middle ear  Mouth/Oral Cavity:  normal lips, Uvula is midline, no mucosal lesions, no trismus, without dentition, normal salivary quality/flow  Oropharynx/Larynx:  normal oropharynx, normal tonsils; patient did not tolerate mirror exam due to excessive gag reflex  Nose:Normal external nasal appearance. Anterior rhinoscopy shows ecchymosis around the bilateral nasal yonny. Unable to visualize her septal perforation. NECK: Normal range of motion, no thyromegaly, trachea is midline, no lymphadenopathy, no neck masses, no crepitus  CHEST: Normal respiratory effort, no retractions, breathing comfortably  SKIN: No rashes, normal appearing skin, no evidence of skin lesions/tumors  Neuro:  cranial nerve II-XII intact; normal gait  Cardio:  no edema    There is bilateral infraorbital ecchymosis and ecchymosis across her nasal dorsum. There is a leftward deviation of the nasal pyramid with a C shaped deformity. Minimal tenderness. No pen fracture or stepoffs. EOMI. PROCEDURE      This note was generated completely or in part utilizing Dragon dictation speech recognition software. Occasionally, words are mistranscribed and despite editing, the text may contain inaccuracies due to incorrect word recognition.   If further clarification is needed please contact the office at [phone]. An electronic signature was used to authenticate this note.     --Isrrael Hamilton MD

## 2021-05-01 NOTE — H&P
History and Physical  Dr. Trish Hollingsworth  4/30/2021    PCP: Hetal Anderson MD    Cc:   Chief Complaint   Patient presents with    Fall     Pt states she was turning the light on at home, fell forward hitting left knee and right shoulder, slipped over slippers, no LOC, landed on head, 81 mg ASA daily. (Dr. Trinity Camejo mother)       HPI:  Karlie Carter is a 80 y.o. female who has a past medical history of Arthritis, Diabetes mellitus ([address] HYPERCHOLESTERAEMIA, Hypertension, and TIA (transient ischaemic attack). Patient presents with Patella fracture. HPI     80 y.o. female patient presents emergency department for evaluation of slip and fall. Patient states she was turning on the light in her home when she fell forward. Patient states she hit her face and forehead on the ground. Patient states she fell onto both knees but is having significant pain and swelling to the left knee. Patient states she feels she cannot move it very well. Patient is also having pain to the posterior right shoulder. Patient denies any loss of consciousness. She takes a baby aspirin daily. She states she had a bloody nose for a short period of time however it is not currently bleeding. CT Face shows    FACIAL BONES: There are minimally displaced fractures of the nasal bones with   deviation to the left.  The nasal septum is deviated as well. Pt is offered reduction in ER, but pt declines at this time      Plain film of L knee also reviewed  Impression:        Acute transverse fracture through the midbody of the patella. Ortho consulted to [place].   Family member state that their house has a lot of stairs and they do not feel safe taking the patient home. PT/OT has also been asked to see. Problem list of hospitalization thus far: Active Hospital Problems    Diagnosis    Patella fracture [S82.009A]    Nasal fracture [S02. 2XXA]    Closed comminuted fracture of left patella, initial encounter [S82.042A]    Controlled type 2 diabetes mellitus without complication, without long-term current use of insulin (HCC) [E11.9]    HTN (hypertension) [I10]    Hyperlipidemia [E78.5]         Review of Systems: (1 system for EPF, 2-9 for detailed, 10+ for comprehensive)  Review of Systems   Constitutional: Negative for chills and fever. HENT: Negative for ear discharge and ear pain. Eyes: Negative for pain and redness. Respiratory: Negative for cough and shortness of breath. Cardiovascular: Negative for chest pain and leg swelling. Gastrointestinal: Negative for nausea and vomiting. Endocrine: Negative for polydipsia and polyphagia. Genitourinary: Negative for frequency and urgency. Musculoskeletal: Positive for joint swelling. Negative for neck pain. Allergic/Immunologic: Negative for food allergies. Neurological: Negative for dizziness and light-headedness. Hematological: Negative for adenopathy. Psychiatric/Behavioral: Negative for confusion and hallucinations. Past Medical History:   Past Medical History:   Diagnosis Date    Arthritis     Diabetes mellitus ([facility] [address]     HYPERCHOLESTERAEMIA     Hypertension     TIA (transient ischaemic attack) 2003       Past Surgical History:   Past Surgical History:   Procedure Laterality Date    COLECTOMY      JOINT REPLACEMENT  04/22/2010    left knee    TOTAL KNEE ARTHROPLASTY  8/19/2010    right, cemented       Social History:   Social History     Tobacco History     Smoking Status  Never Smoker    Smokeless Tobacco Use  Never Used          Alcohol History     Alcohol Use Status  No          Drug Use     Drug Use Status  No          Sexual Activity     Sexually Active  Never                Fam History:   Family History   Problem Relation Age of Onset    Arthritis Mother        PFSH: The above PMHx, PSHx, SocHx, FamHx has been reviewed by myself.  (1 area for detailed, 2-3 for comprehensive)      Code Status: Prior    Meds - following list of home medications fromelectronic chart has been reviewed by myself  Prior to Admission medications    Medication Sig Start Date End Date Taking? Authorizing Provider   pioglitazone-metFORMIN (ACTOPLUS MET)  MG per tablet Take 1 tablet by mouth daily 4/22/21 7/21/21  Chandler Lay MD   olmesartan-amLODIPine-HCTZ 40-10-25 MG TABS Take 1 tablet by mouth daily 4/22/21 7/21/21  Chandler Lay MD   doxepin (SINEQUAN) 50 MG capsule Take 1 capsule by mouth nightly 4/22/21   Chandler Lay MD   omeprazole (PRILOSEC) 40 MG delayed release capsule Take 1 capsule by mouth daily 4/22/21 7/21/21  Chandler Lay MD   budesonide-formoterol (SYMBICORT) 160-4.5 MCG/ACT AERO Inhale 2 puffs into the lungs 2 times daily 4/22/21   Chandler Lay MD   benzonatate (TESSALON) 200 MG capsule Take 1 capsule by mouth 2 times daily as needed for Cough 4/22/21 5/22/21  Chandler Lay MD   Cholecalciferol (VITAMIN D3) 50 MCG (2000 UT) TABS Take 1 tablet by mouth daily 4/22/21 7/21/21  Chandler Lay MD   albuterol sulfate  (90 Base) MCG/ACT inhaler Inhale 2 puffs into the lungs every 6 hours as needed for Wheezing 4/22/21   Chandler Lay MD   fluticasone [facility]) 50 MCG/ACT nasal spray 1 spray by Each Nostril route daily 4/22/21 5/22/21  Chandler Lay MD   atorvastatin (LIPITOR) 10 MG tablet Take 1 tablet by mouth daily 1/13/21 4/13/21  Chandler Lay MD   aspirin 81 MG EC tablet Take 1 tablet by mouth daily 1/13/21 4/13/21  Chandler Lay MD   albuterol sulfate HFA (VENTOLIN HFA) 108 (90 Base) MCG/ACT inhaler Inhale 2 puffs into the lungs 4 times daily as needed for Wheezing 1/13/21   Chandler Lay MD   montelukast (SINGULAIR) 10 MG tablet Take 1 tablet by mouth daily 8/16/20   Chandler Lay MD   blood glucose monitor kit and supplies Test 2 times a day & as needed for symptoms of irregular blood glucose.  10/28/19   Chandler Lay MD   blood glucose monitor strips Test 1 times a day & as needed for symptoms of irregular blood glucose. 10/28/19   Christopher Guallpa MD   Lancets MISC 1 each by Does not apply route 2 times daily 10/1/19   Christopher Guallpa MD   Alcohol Swabs PADS 1 each by Does not apply route daily 9/28/19   Christopher Guallpa MD   Cholecalciferol (VITAMIN D3) 2000 units CAPS Take 1 capsule by mouth daily 9/4/19   Christopher Guallpa MD   fluticasone-vilanterol (BREO ELLIPTA) 100-25 MCG/INH AEPB inhaler Inhale 2 puffs into the lungs daily 9/4/19   Christopher Guallpa MD   blood glucose test strips (ASCENSIA AUTODISC VI;ONE TOUCH ULTRA TEST VI) strip 1 each by In Vitro route daily As needed. 8/23/18   Christopher Guallpa MD   calcium carbonate-vitamin D (CALCIUM 600 + D) 600-400 MG-UNIT TABS per tab Take 1 tablet by mouth daily 11/10/17   Christopher Guallpa MD   Denture Care Products (POLIDENT 3 MINUTE) TBEF 1 tablet by Does not apply route daily 8/7/17   Christopher Guallpa MD   Multiple Vitamin (MULTIVITAMIN PO) Take 1 tablet by mouth daily.     Historical Provider, MD         Allergies   Allergen Reactions    Percocet [Oxycodone-Acetaminophen] Rash     Not sure but thinks it was this drug             EXAM: (2-7 system for EPF/Detailed, ?8 for Comprehensive)  BP (!) 154/80   Pulse 82   Temp 97.5 °F (36.4 °C)   Resp 16   Wt 113 lb (51.3 kg)   SpO2 96%   BMI 23.62 kg/m²   Constitutional: vitals as above: alert, appears stated age and cooperative  Psychiatric: normal insight and judgment, oriented to person, place, time, and general circumstances  Head: Normocephalic, without obvious abnormality, atraumatic  Eyes:lids and lashes normal, conjunctivae and sclerae normal and pupils equal, round, reactive to light and accomodation  EMNT: external ears normal, swelling to bridge of nose, deviation to L  Neck: no adenopathy, supple, symmetrical, trachea midline and thyroid not enlarged, symmetric, no tenderness/mass/nodules   Respiratory: clear to auscultation without wheezes or rales  Cardiovascular: normal rate, regular rhythm, normal S1 and S2 and no carotid bruits  Gastrointestinal: soft, non-tender, non-distended, normal bowel sounds, no masses or organomegaly  Lymphatic:   Extremities: swelling to L knee, ace dressing CDI  Skin:No rashes or nodules noted. Neurologic:    LABS:  Labs Reviewed - No data to display      IMAGING:  Imaging results from the ER have been reviewed in the computerized chart. Xr Chest (2 Vw)    Result Date: 4/22/2021  EXAMINATION: TWO XRAY VIEWS OF THE CHEST 4/22/2021 3:09 pm COMPARISON: 09/12/2019 HISTORY: ORDERING SYSTEM PROVIDED HISTORY: Chronic cough TECHNOLOGIST PROVIDED HISTORY: Reason for Exam: Chronic cough x 1 month Acuity: Acute Type of Exam: Initial FINDINGS: Heart size is normal.  Some increased opacity is present in the lower right hilum. Appearance is more prominent than on the prior study. No acute airspace disease present. No abnormal vascular congestion. Mild to moderate bullous changes. No pleural effusion. Changes of cuff arthropathy in the right shoulder consistent with full-thickness rotator cuff tear. Prominent increased density in the lower right hilum. Appearance could be due to a perihilar mass but is probably due to calcified lymph nodes. RECOMMENDATION: CT chest with contrast recommended for further evaluation. Xr Shoulder Right (min 2 Views)    Result Date: 4/30/2021  EXAMINATION: THREE XRAY VIEWS OF THE RIGHT SHOULDER 4/30/2021 6:30 pm COMPARISON: None. HISTORY: ORDERING SYSTEM PROVIDED HISTORY: Injury TECHNOLOGIST PROVIDED HISTORY: Reason for exam:->Injury Reason for Exam: Fall Acuity: Acute Type of Exam: Initial FINDINGS: There are severe degenerative changes in the glenohumeral joint. There is elevation of the humeral head consistent with chronic rotator cuff tear. There is no evidence of fracture, dislocation, or other acute osseous abnormality. IMPRESSION No acute osseous abnormality.      Xr Knee Left (3 Views)    Result Date: a suspected or confirmed emergency medical condition->Emergency Medical Condition (MA) Reason for Exam: Fall (Pt states she was turning the light on at home, fell forward hitting left knee and right shoulder, slipped over slippers, no LOC, landed on head, 81 mg ASA daily. (Dr. Leatha Weber mother)) FINDINGS: . BRAIN AND VENTRICLES: The ventricles are midline and symmetric. There is no evidence of intracranial hemorrhage, focal mass lesion, or other acute intracranial abnormality. SKULL: There is no evidence of calvarial fracture. C-SPINE: Vertebral body height and alignment is maintained. There is no evidence of fracture or other acute osseous abnormality. There is moderate to severe multilevel degenerative change. FACIAL BONES: There are minimally displaced fractures of the nasal bones with deviation to the left. The nasal septum is deviated as well. 1. No acute traumatic intracranial abnormality. 2. No traumatic abnormality of the cervical spine. 3. Bilateral nasal bone fractures with deviation to the left     Ct Facial Bones Wo Contrast    Result Date: 4/30/2021  EXAMINATION: CT OF THE HEAD WITHOUT CONTRAST; CT OF THE FACE WITHOUT CONTRAST; CT OF THE CERVICAL SPINE WITHOUT CONTRAST  4/30/2021 6:40 pm TECHNIQUE: CT of the head was performed without the administration of intravenous contrast. Dose modulation, iterative reconstruction, and/or weight based adjustment of the mA/kV was utilized to reduce the radiation dose to as low as reasonably achievable.; CT of the face was performed without the administration of intravenous contrast. Multiplanar reformatted images are provided for review. Dose modulation, iterative reconstruction, and/or weight based adjustment of the mA/kV was utilized to reduce the radiation dose to as low as reasonably achievable.; CT of the cervical spine was performed without the administration of intravenous contrast. Multiplanar reformatted images are provided for review.  Dose modulation, ORDERING SYSTEM PROVIDED HISTORY: Exertional dyspnea TECHNOLOGIST PROVIDED HISTORY: Reason for Exam: Dx: Exertional dyspnea R06.00 (ICD-10-CM) Acuity: Acute Type of Exam: Initial FINDINGS: Mediastinum: The thoracic aorta is normal in course and caliber with diffuse atherosclerotic plaque. The heart is not enlarged with scattered coronary vascular calcification and no pericardial effusion. The main pulmonary artery is normal in caliber. Prominent main pulmonary artery on the right as well as central pulmonary vessels, resulting in the prominent right hilum on earlier chest x-ray. No pathologic hilar or mediastinal adenopathy. Lungs/pleura: No acute infiltrate, consolidation or edema. Multiple pulmonary cysts are noted predominantly on the left. Subsegmental atelectasis in the lower lobes bilaterally, left greater than right. No pleural fluid. The central airways are patent. Upper Abdomen: The visualized upper abdominal viscera are unremarkable. Moderate gastric distension. Soft Tissues/Bones: No acute osseous or soft tissue abnormality. Multilevel osteoarthritic spurring in the thoracic spine. Moderate degenerative change at the right shoulder joint. 1. No evidence of pulmonary mass. Asymmetric prominence of the right pulmonary outflow tract resulting in the prominent right hilum on chest x-ray. Findings suggest possible pulmonary hypertension. 2. No acute pulmonary infiltrate. Subsegmental bibasilar atelectasis. Multiple pulmonary cysts, predominantly on the left. Consider pulmonary consultation and high-resolution CT follow-up for more complete evaluation. 3. Atherosclerotic calcification in the aorta and coronary circulation.      Ct Cervical Spine Wo Contrast    Result Date: 4/30/2021  EXAMINATION: CT OF THE HEAD WITHOUT CONTRAST; CT OF THE FACE WITHOUT CONTRAST; CT OF THE CERVICAL SPINE WITHOUT CONTRAST  4/30/2021 6:40 pm TECHNIQUE: CT of the head was performed without the administration of intravenous contrast. Dose modulation, iterative reconstruction, and/or weight based adjustment of the mA/kV was utilized to reduce the radiation dose to as low as reasonably achievable.; CT of the face was performed without the administration of intravenous contrast. Multiplanar reformatted images are provided for review. Dose modulation, iterative reconstruction, and/or weight based adjustment of the mA/kV was utilized to reduce the radiation dose to as low as reasonably achievable.; CT of the cervical spine was performed without the administration of intravenous contrast. Multiplanar reformatted images are provided for review. Dose modulation, iterative reconstruction, and/or weight based adjustment of the mA/kV was utilized to reduce the radiation dose to as low as reasonably achievable. COMPARISON: None. HISTORY: ORDERING SYSTEM PROVIDED HISTORY: fall, bloody nose, pain to nasal bridge TECHNOLOGIST PROVIDED HISTORY: Reason for exam:->fall, bloody nose, pain to nasal bridge Has a \"code stroke\" or \"stroke alert\" been called? ->No Decision Support Exception - unselect if not a suspected or confirmed emergency medical condition->Emergency Medical Condition (MA) Reason for Exam: Fall (Pt states she was turning the light on at home, fell forward hitting left knee and right shoulder, slipped over slippers, no LOC, landed on head, 81 mg ASA daily. (Dr. Nickie Flor mother)) FINDINGS: . BRAIN AND VENTRICLES: The ventricles are midline and symmetric. There is no evidence of intracranial hemorrhage, focal mass lesion, or other acute intracranial abnormality. SKULL: There is no evidence of calvarial fracture. C-SPINE: Vertebral body height and alignment is maintained. There is no evidence of fracture or other acute osseous abnormality. There is moderate to severe multilevel degenerative change. FACIAL BONES: There are minimally displaced fractures of the nasal bones with deviation to the left.   The nasal septum is deviated as well.     1. No acute traumatic intracranial abnormality. 2. No traumatic abnormality of the cervical spine. 3. Bilateral nasal bone fractures with deviation to the left     Ct Knee Left Wo Contrast    Result Date: 4/30/2021  EXAMINATION: CT OF THE LEFT KNEE WITHOUT CONTRAST 4/30/2021 7:49 pm TECHNIQUE: CT of the left knee was performed without the administration of intravenous contrast.  Multiplanar reformatted images are provided for review. Dose modulation, iterative reconstruction, and/or weight based adjustment of the mA/kV was utilized to reduce the radiation dose to as low as reasonably achievable. COMPARISON: Left knee radiographs 04/30/2021. HISTORY ORDERING SYSTEM PROVIDED HISTORY: patella fracture seen on xr, tibial plateu fracture? pt fell onto knee TECHNOLOGIST PROVIDED HISTORY: Reason for exam:->patella fracture seen on xr, tibial plateu fracture? pt fell onto knee Decision Support Exception - unselect if not a suspected or confirmed emergency medical condition->Emergency Medical Condition (MA) Reason for Exam: Fall (Pt states she was turning the light on at home, fell forward hitting left knee and right shoulder, slipped over slippers, no LOC, landed on head, 81 mg ASA daily. (Dr. Waldo Quiroz mother)) Acuity: Acute Type of Exam: Initial FINDINGS: Bones: There is an acute comminuted distracted fracture of the mid patella. No other fracture identified. No dislocation. Soft Tissue: The extensor mechanism is grossly intact. Anterior subcutaneous edema. No soft tissue gas or foreign body. Atherosclerotic vascular calcifications. Joint:  Status post left total knee arthroplasty. The hardware is appropriately positioned and intact without abnormal interface widening. Moderate joint effusion. 1. Acute comminuted distracted fracture of the mid patella. 2. Status post left total knee arthroplasty. 3. Moderate joint effusion.              MEDICAL DECISION MAKING:    Principal Problem:    Patella fracture -New Problem to me. Pt with issues ambulatin, some pain issues  Plan: admit, ask ortho to eval. Narcotics ordered for pain control. Active Problems:    Controlled type 2 diabetes mellitus without complication, without long-term current use of insulin ([address] -Established problem. Stable. Sugars ok in ER  Plan: Patient placed on controlled carbohydrate diet. Fingerstick sugars to be checked to monitor for both hypoglycemia as well as hyperglycemia. Sliding scale insulin ordered. Glucagon and dextrose ordered for hypoglycemia. Patient will be continued on home medications. Hemoglobin a1c to be ordered to assess efficacy of therapy. HTN (hypertension) -Established problem. Stable. 154/80  Plan: stay on same meds    Hyperlipidemia -Established problem. Stable. Plan: Continue present orders/plan. Nasal fracture -Established problem. Stable. Plan: ENT asked to see     Closed comminuted fracture of left patella, initial encounter -Established problem. Stable. Diagnoses as listed above, designated as new or established and plan outlined for each. Data Reviewed:   (1) Lab tests were reviewed or ordered. (1) Radiology tests were reviewed or ordered. (1) Medical test (Echo, EKG, PFT/maylin) were ordered. (1)History was not obtained from someone other than patient  (1) Old records  were reviewed - see HPI/MDM for pertinent details if review done. (2) Case wasdiscussed with another health care provider: Dr Irineo Galarza  (2) Imaging was viewed by myself. (2) EKG  was not viewed by myself. The patient isbeing placed in inpatient status with the expectation of requiring a hospital stay spanning at least two midnights for care and treatment of the problems noted in the problem list.  This determination is also based on thepatients comorbidities and past medical history, the severity and timing of the signs and symptoms upon presentation.         Electronically signed by: Bernarda Chowdary 4/30/2021

## 2021-05-01 NOTE — PROGRESS NOTES
0005: Admission and assessment complete, VSS, pulses and sensation intact in TESFAYE LE, L knee bruised and swollen, wrapped with an ace bandage and immobilizer applied, pt tolerated well, bruising noted to bridge of pt nose and upper lip, encouraged pt to keep ice on her nose, discussed care plan, pt mutually agrees, call light and belongings in reach, will continue monitoring. 5: gave Norco for L knee and R shoulder pain, see MAR,  pt also having pain under her R breast, ice pack applied. [id number]: Assisted pt onto the bedpan, tolerated fairly well, provided denture care, pt upper lip swollen and bruised, painful to touch offer ice pack, pt declines, at this time.   Claudio Blancas

## 2021-05-01 NOTE — CONSULTS
ORTHOPAEDIC CONSULTATION NOTE    Chief Complaint   Patient presents with    Fall     Pt states she was turning the light on at home, fell forward hitting left knee and right shoulder, slipped over slippers, no LOC, landed on head, 81 mg ASA daily. (Dr. Gann Reeves mother)       HPI   5/1/2021  80 y.o. female seen in consultation at the request of Mechelle Fontanez and Rahat Walker MD for evaluation of left knee injury    Onset yesterday afternoon/evening  Injury/trauma slipped at home on tiles, landed on front of left knee  History of symptoms - previous left total knee arthroplasty with Dr Ayad Dumont in 2010   Prior to this injury, the patient reports the left TKA was doing well  Pain is located diffuse left knee, but worse anteriorly  Worse with pressure, ROM, WB  Better with rest, ice, knee immobilizer  Associated with marked swelling/effusion and ecchymosis  Associated with inability to straighten left knee and raise leg against gravity  No open wounds or abrasions  No pain elsewhere     Numbness and/or tingling = no     Type 2 DM, on orals      Review of Systems  Constitutional - denies fevers, weight loss  Cardiovascular - denies chest pain, palpitations, peripheral edema, blood clots  Respiratory - denies SOB, cough  HEENT - hit face/head with fall, no LOC however  Gastrointestinal - denies abdominal pain, nausea, vomiting  Musculoskeletal - per HPI.   Also right shoulder pain  Integumentary - denies rash, sores  Neurologic - denies numbness, tingling, paresthesias  Hematologic - denies abnormal bleeding, blood clots  Allergic/Immunologic - denies metal allergies, recurrent infections    Allergies   Allergen Reactions    Percocet [Oxycodone-Acetaminophen] Rash     Not sure but thinks it was this drug        Current Facility-Administered Medications   Medication Dose Route Frequency Provider Last Rate Last Admin    pioglitazone (ACTOS) tablet 15 mg  15 mg Oral Daily Lori Alva MD        And    metFORMIN (GLUCOPHAGE) tablet 500 mg  500 mg Oral Daily with breakfast Teresa Rea MD        losartan (COZAAR) tablet 100 mg  100 mg Oral Daily Teresa Rea MD   Stopped at 05/01/21 7722    pantoprazole (PROTONIX) tablet 40 mg  40 mg Oral QAM AC Teresa Rea MD   40 mg at 05/01/21 3438    amLODIPine (NORVASC) tablet 10 mg  10 mg Oral Daily Teresa Rea MD   Stopped at 05/01/21 0841    hydroCHLOROthiazide (HYDRODIURIL) tablet 25 mg  25 mg Oral Daily Teresa Rea MD   25 mg at 05/01/21 0040    budesonide-formoterol (SYMBICORT) 160-4.5 MCG/ACT inhaler 2 puff  2 puff Inhalation BID Teresa Rea MD   2 puff at 05/01/21 0940    calcium-vitamin D (Florencia Alen) 500-200 MG-UNIT per tablet 1 tablet  1 tablet Oral BID Teresa Rea MD   1 tablet at 05/01/21 1864    morphine (PF) injection 0.5 mg  0.5 mg Intravenous Q4H PRN Teresa Rea MD        sodium chloride (OCEAN, BABY AYR) 0.65 % nasal spray 1 spray  1 spray Each Nostril Q4H PRN Prudence Quintero MD        Vitamin D (CHOLECALCIFEROL) tablet 2,000 Units  2,000 Units Oral Daily Teresa Rea MD   2,000 Units at 05/01/21 0371    montelukast (SINGULAIR) tablet 10 mg  10 mg Oral Nightly Teresa Rea MD        atorvastatin (LIPITOR) tablet 10 mg  10 mg Oral Nightly Teresa Rea MD   10 mg at 05/01/21 0049    albuterol sulfate  (90 Base) MCG/ACT inhaler 2 puff  2 puff Inhalation 4x Daily PRN Teresa Rea MD        doxepin [facility]) capsule 50 mg  50 mg Oral Nightly Teresa Rea MD   50 mg at 05/01/21 0049    benzonatate (TESSALON) capsule 200 mg  200 mg Oral BID PRN Teresa Rea MD        fluticasone [facility]) 50 MCG/ACT nasal spray 1 spray  1 spray Each Nostril Daily Teresa Rea MD   1 spray at 05/01/21 0842    0.9 % sodium chloride infusion   Intravenous Continuous Teresa Rea MD        sodium chloride flush 0.9 % injection 5-40 mL  5-40 mL Intravenous 2 times per day Teresa Rea MD   10 mL at 05/01/21 8779    sodium chloride flush 0.9 % injection 10 mL  10 mL Intravenous PRN German Collado MD        0.9 % sodium chloride infusion  25 mL Intravenous PRN German Collado MD        morphine injection 4 mg  4 mg Intravenous Q2H PRN German Collado MD        [facility]) tablet 12.5 mg  12.5 mg Oral Q6H PRN German Collado MD        Or    ondansetron [facility]) injection 4 mg  4 mg Intravenous Q6H PRN German Collado MD        magnesium hydroxide (MILK OF MAGNESIA) 400 MG/5ML suspension 30 mL  30 mL Oral Daily PRN German Collado MD        potassium chloride (KLOR-CON M) extended release tablet 40 mEq  40 mEq Oral PRN German Collado MD        Or    potassium bicarb-citric acid (EFFER-K) effervescent tablet 40 mEq  40 mEq Oral PRN German Collado MD        Or    potassium chloride 10 mEq/100 mL IVPB (Peripheral Line)  10 mEq Intravenous PRN German Collado MD        HYDROcodone-acetaminophen [facility]) 5-325 MG per tablet 1 tablet  1 tablet Oral Q6H PRN German Collado MD   1 tablet at 05/01/21 1521       Past Medical History:   Diagnosis Date    Arthritis     Diabetes mellitus ([address]     HYPERCHOLESTERAEMIA     Hypertension     TIA (transient ischaemic attack) 2003        Past Surgical History:   Procedure Laterality Date    COLECTOMY      JOINT REPLACEMENT  04/22/2010    left knee    TOTAL KNEE ARTHROPLASTY  8/19/2010    right, cemented       Family History   Problem Relation Age of Onset    Arthritis Mother        Social History     Socioeconomic History    Marital status:      Spouse name: Not on file    Number of children: Not on file    Years of education: Not on file    Highest education level: Not on file   Occupational History    Occupation: retired gerardo from [employer] resource strain: Not on file    Food insecurity     Worry: Not on file     Inability: Not on file   Compring needs     Medical: Not on file     Non-medical: Not on file   Tobacco Use    Smoking status: Never Smoker    Smokeless tobacco: Never Used   Substance and Sexual Activity    Alcohol use: No    Drug use: No    Sexual activity: Never   Lifestyle    Physical activity     Days per week: Not on file     Minutes per session: Not on file    Stress: Not on file   Relationships    Social connections     Talks on phone: Not on file     Gets together: Not on file     Attends [employer] service: Not on file     Active member of club or organization: Not on file     Attends meetings of clubs or organizations: Not on file     Relationship status: Not on file    Intimate partner violence     Fear of current or ex partner: Not on file     Emotionally abused: Not on file     Physically abused: Not on file     Forced sexual activity: Not on file   Other Topics Concern    Not on file   Social History Narrative     ,  4 children grown , 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   Psychiatric - pleasant, normal mood & affect  Cardiovascular - RRR, equivocal peripheral edema, left dorsalis pedis pulse 2+  Respiratory - respirations unlabored, on room air  Head - normocephalic atraumatic  Skin - no rashes, wounds, or lesions seen on exposed skin  Neck - no TTP C spine, no step off, neg Spurling's  Neurological - LLE SILT SP/DP/T/sural/saphenous nerve distributions; EHL/FHL/TA/GS intact  Left knee - previous midline surgical scar well healed   Traumatic effusion present   Ecchymosis present   No open skin wounds, no abrasions seen   Palpable defect in extensor mechanism   Crepitus ALT 15 05/01/2021    LABGLOM >60 05/01/2021    GFRAA >60 05/01/2021    AGRATIO 1.6 05/01/2021    GLOB 2.5 05/01/2021       Lab Results   Component Value Date    INR 1.03 04/30/2021    INR 1.43 (H) 08/22/2010    INR 1.93 (H) 08/21/2010    PROTIME 11.9 04/30/2021    PROTIME 15.2 (H) 08/22/2010    PROTIME 20.5 (H) 08/21/2010         Assessment & Plan:  80 y.o. female who presents with   Principal Problem:    Closed comminuted fracture of left patella, initial encounter, periprosthetic    History of total knee arthroplasty, left, 2010   Active Problems:    Controlled type 2 diabetes mellitus without complication, without long-term current use of insulin (HCC)    HTN (hypertension)    Hyperlipidemia    Nasal fracture    Right rotator cuff tear arthropathy - conservative tx for now  Resolved Problems:    * No resolved hospital problems. *    Left knee - periprosthetic patellar fracture  I have recommended surgery in this scenario due to fracture displacement and loss of continuity of the extensor mechanism (positive straight leg raise test)  Goal of surgery would be to restore extensor mechanism for weight bearing and ambulation  Cement mantle of the patellar button/component appears intact on XRs  Surgery would be ORIF versus partial patellectomy  Biggest concerns/risks with surgery would be wound healing complications/breakdown/necrosis, and infection  Other risks include, but not limited to, persistent pain, weakness, extensor lag, VTE, neurovascular injury, prominent/painful hardware  The patient and her family wish to proceed with surgery.     Will plan for surgery tomorrow  NPO @ midnight  Order preop COVID-19 test  Check aPTT tomorrow AM as well    Blaine Beltrán

## 2021-05-02 ENCOUNTER — ANESTHESIA EVENT (OUTPATIENT)
Dept: OPERATING ROOM | Age: 86
DRG: 516 | End: 2021-05-02
Payer: MEDICARE

## 2021-05-02 ENCOUNTER — ANESTHESIA (OUTPATIENT)
Dept: OPERATING ROOM | Age: 86
DRG: 516 | End: 2021-05-02
Payer: MEDICARE

## 2021-05-02 ENCOUNTER — APPOINTMENT (OUTPATIENT)
Dept: GENERAL RADIOLOGY | Age: 86
DRG: 516 | End: 2021-05-02
Payer: MEDICARE

## 2021-05-02 VITALS
RESPIRATION RATE: 9 BRPM | SYSTOLIC BLOOD PRESSURE: 122 MMHG | OXYGEN SATURATION: 70 % | DIASTOLIC BLOOD PRESSURE: 58 MMHG

## 2021-05-02 LAB
ANION GAP SERPL CALCULATED.3IONS-SCNC: 8 MMOL/L (ref 3–16)
APTT: 33.1 SEC (ref 24.2–36.2)
BASOPHILS ABSOLUTE: 0 K/UL (ref 0–0.2)
BASOPHILS RELATIVE PERCENT: 0.3 %
BUN BLDV-MCNC: 19 MG/DL (ref 7–20)
CALCIUM SERPL-MCNC: 8.6 MG/DL (ref 8.3–10.6)
CHLORIDE BLD-SCNC: 93 MMOL/L (ref 99–110)
CO2: 31 MMOL/L (ref 21–32)
CREAT SERPL-MCNC: 0.6 MG/DL (ref 0.6–1.2)
EOSINOPHILS ABSOLUTE: 0.2 K/UL (ref 0–0.6)
EOSINOPHILS RELATIVE PERCENT: 1.9 %
GFR AFRICAN AMERICAN: >60
GFR NON-AFRICAN AMERICAN: >60
GLUCOSE BLD-MCNC: 117 MG/DL (ref 70–99)
GLUCOSE BLD-MCNC: 130 MG/DL (ref 70–99)
GLUCOSE BLD-MCNC: 134 MG/DL (ref 70–99)
GLUCOSE BLD-MCNC: 136 MG/DL (ref 70–99)
GLUCOSE BLD-MCNC: 224 MG/DL (ref 70–99)
GLUCOSE BLD-MCNC: 97 MG/DL (ref 70–99)
HCT VFR BLD CALC: 34.5 % (ref 36–48)
HEMOGLOBIN: 11.4 G/DL (ref 12–16)
LYMPHOCYTES ABSOLUTE: 1.7 K/UL (ref 1–5.1)
LYMPHOCYTES RELATIVE PERCENT: 22 %
MCH RBC QN AUTO: 28.9 PG (ref 26–34)
MCHC RBC AUTO-ENTMCNC: 33 G/DL (ref 31–36)
MCV RBC AUTO: 87.7 FL (ref 80–100)
MONOCYTES ABSOLUTE: 0.9 K/UL (ref 0–1.3)
MONOCYTES RELATIVE PERCENT: 12 %
NEUTROPHILS ABSOLUTE: 5 K/UL (ref 1.7–7.7)
NEUTROPHILS RELATIVE PERCENT: 63.8 %
PDW BLD-RTO: 14.1 % (ref 12.4–15.4)
PERFORMED ON: ABNORMAL
PERFORMED ON: NORMAL
PLATELET # BLD: 206 K/UL (ref 135–450)
PMV BLD AUTO: 8.3 FL (ref 5–10.5)
POTASSIUM SERPL-SCNC: 4 MMOL/L (ref 3.5–5.1)
RBC # BLD: 3.93 M/UL (ref 4–5.2)
SODIUM BLD-SCNC: 132 MMOL/L (ref 136–145)
WBC # BLD: 7.8 K/UL (ref 4–11)

## 2021-05-02 PROCEDURE — 2500000003 HC RX 250 WO HCPCS: Performed by: ORTHOPAEDIC SURGERY

## 2021-05-02 PROCEDURE — 27524 TREAT KNEECAP FRACTURE: CPT | Performed by: ORTHOPAEDIC SURGERY

## 2021-05-02 PROCEDURE — 6360000002 HC RX W HCPCS: Performed by: ORTHOPAEDIC SURGERY

## 2021-05-02 PROCEDURE — 7100000001 HC PACU RECOVERY - ADDTL 15 MIN: Performed by: ORTHOPAEDIC SURGERY

## 2021-05-02 PROCEDURE — 1200000000 HC SEMI PRIVATE

## 2021-05-02 PROCEDURE — C1769 GUIDE WIRE: HCPCS | Performed by: ORTHOPAEDIC SURGERY

## 2021-05-02 PROCEDURE — 0QSF04Z REPOSITION LEFT PATELLA WITH INTERNAL FIXATION DEVICE, OPEN APPROACH: ICD-10-PCS | Performed by: ORTHOPAEDIC SURGERY

## 2021-05-02 PROCEDURE — 3700000000 HC ANESTHESIA ATTENDED CARE: Performed by: ORTHOPAEDIC SURGERY

## 2021-05-02 PROCEDURE — 3600000014 HC SURGERY LEVEL 4 ADDTL 15MIN: Performed by: ORTHOPAEDIC SURGERY

## 2021-05-02 PROCEDURE — 3700000001 HC ADD 15 MINUTES (ANESTHESIA): Performed by: ORTHOPAEDIC SURGERY

## 2021-05-02 PROCEDURE — 27599 UNLISTED PX FEMUR/KNEE: CPT | Performed by: ORTHOPAEDIC SURGERY

## 2021-05-02 PROCEDURE — 2500000003 HC RX 250 WO HCPCS: Performed by: FAMILY MEDICINE

## 2021-05-02 PROCEDURE — 80048 BASIC METABOLIC PNL TOTAL CA: CPT

## 2021-05-02 PROCEDURE — 7100000000 HC PACU RECOVERY - FIRST 15 MIN: Performed by: ORTHOPAEDIC SURGERY

## 2021-05-02 PROCEDURE — 36415 COLL VENOUS BLD VENIPUNCTURE: CPT

## 2021-05-02 PROCEDURE — 73560 X-RAY EXAM OF KNEE 1 OR 2: CPT

## 2021-05-02 PROCEDURE — 94640 AIRWAY INHALATION TREATMENT: CPT

## 2021-05-02 PROCEDURE — 6360000002 HC RX W HCPCS: Performed by: FAMILY MEDICINE

## 2021-05-02 PROCEDURE — 2709999900 HC NON-CHARGEABLE SUPPLY: Performed by: ORTHOPAEDIC SURGERY

## 2021-05-02 PROCEDURE — 2580000003 HC RX 258: Performed by: ORTHOPAEDIC SURGERY

## 2021-05-02 PROCEDURE — 3600000004 HC SURGERY LEVEL 4 BASE: Performed by: ORTHOPAEDIC SURGERY

## 2021-05-02 PROCEDURE — 2580000003 HC RX 258: Performed by: INTERNAL MEDICINE

## 2021-05-02 PROCEDURE — 2700000000 HC OXYGEN THERAPY PER DAY

## 2021-05-02 PROCEDURE — 6360000002 HC RX W HCPCS: Performed by: INTERNAL MEDICINE

## 2021-05-02 PROCEDURE — 85730 THROMBOPLASTIN TIME PARTIAL: CPT

## 2021-05-02 PROCEDURE — 6370000000 HC RX 637 (ALT 250 FOR IP): Performed by: ORTHOPAEDIC SURGERY

## 2021-05-02 PROCEDURE — 85025 COMPLETE CBC W/AUTO DIFF WBC: CPT

## 2021-05-02 RX ORDER — HYDROMORPHONE HCL 110MG/55ML
0.25 PATIENT CONTROLLED ANALGESIA SYRINGE INTRAVENOUS EVERY 5 MIN PRN
Status: DISCONTINUED | OUTPATIENT
Start: 2021-05-02 | End: 2021-05-02 | Stop reason: HOSPADM

## 2021-05-02 RX ORDER — PROMETHAZINE HYDROCHLORIDE 25 MG/ML
6.25 INJECTION, SOLUTION INTRAMUSCULAR; INTRAVENOUS PRN
Status: DISCONTINUED | OUTPATIENT
Start: 2021-05-02 | End: 2021-05-02 | Stop reason: HOSPADM

## 2021-05-02 RX ORDER — FENTANYL CITRATE 50 UG/ML
50 INJECTION, SOLUTION INTRAMUSCULAR; INTRAVENOUS EVERY 5 MIN PRN
Status: DISCONTINUED | OUTPATIENT
Start: 2021-05-02 | End: 2021-05-02 | Stop reason: HOSPADM

## 2021-05-02 RX ORDER — ONDANSETRON 2 MG/ML
INJECTION INTRAMUSCULAR; INTRAVENOUS PRN
Status: DISCONTINUED | OUTPATIENT
Start: 2021-05-02 | End: 2021-05-02 | Stop reason: SDUPTHER

## 2021-05-02 RX ORDER — LABETALOL HYDROCHLORIDE 5 MG/ML
5 INJECTION, SOLUTION INTRAVENOUS EVERY 10 MIN PRN
Status: DISCONTINUED | OUTPATIENT
Start: 2021-05-02 | End: 2021-05-02 | Stop reason: HOSPADM

## 2021-05-02 RX ORDER — DOXYCYCLINE HYCLATE 100 MG
100 TABLET ORAL EVERY 12 HOURS SCHEDULED
Status: DISCONTINUED | OUTPATIENT
Start: 2021-05-03 | End: 2021-05-06 | Stop reason: HOSPADM

## 2021-05-02 RX ORDER — DEXAMETHASONE SODIUM PHOSPHATE 4 MG/ML
INJECTION, SOLUTION INTRA-ARTICULAR; INTRALESIONAL; INTRAMUSCULAR; INTRAVENOUS; SOFT TISSUE PRN
Status: DISCONTINUED | OUTPATIENT
Start: 2021-05-02 | End: 2021-05-02 | Stop reason: SDUPTHER

## 2021-05-02 RX ORDER — PHENYLEPHRINE HCL IN 0.9% NACL 1 MG/10 ML
SYRINGE (ML) INTRAVENOUS PRN
Status: DISCONTINUED | OUTPATIENT
Start: 2021-05-02 | End: 2021-05-02 | Stop reason: SDUPTHER

## 2021-05-02 RX ORDER — VANCOMYCIN HYDROCHLORIDE 1 G/20ML
INJECTION, POWDER, LYOPHILIZED, FOR SOLUTION INTRAVENOUS
Status: COMPLETED | OUTPATIENT
Start: 2021-05-02 | End: 2021-05-02

## 2021-05-02 RX ORDER — MEPERIDINE HYDROCHLORIDE 25 MG/ML
12.5 INJECTION INTRAMUSCULAR; INTRAVENOUS; SUBCUTANEOUS EVERY 5 MIN PRN
Status: DISCONTINUED | OUTPATIENT
Start: 2021-05-02 | End: 2021-05-02 | Stop reason: HOSPADM

## 2021-05-02 RX ORDER — BUPIVACAINE HYDROCHLORIDE 5 MG/ML
INJECTION, SOLUTION EPIDURAL; INTRACAUDAL
Status: COMPLETED | OUTPATIENT
Start: 2021-05-02 | End: 2021-05-02

## 2021-05-02 RX ORDER — PROPOFOL 10 MG/ML
INJECTION, EMULSION INTRAVENOUS PRN
Status: DISCONTINUED | OUTPATIENT
Start: 2021-05-02 | End: 2021-05-02 | Stop reason: SDUPTHER

## 2021-05-02 RX ORDER — DIPHENHYDRAMINE HYDROCHLORIDE 50 MG/ML
12.5 INJECTION INTRAMUSCULAR; INTRAVENOUS
Status: DISCONTINUED | OUTPATIENT
Start: 2021-05-02 | End: 2021-05-02 | Stop reason: HOSPADM

## 2021-05-02 RX ORDER — SUCCINYLCHOLINE/SOD CL,ISO/PF 200MG/10ML
SYRINGE (ML) INTRAVENOUS PRN
Status: DISCONTINUED | OUTPATIENT
Start: 2021-05-02 | End: 2021-05-02 | Stop reason: SDUPTHER

## 2021-05-02 RX ORDER — LIDOCAINE HYDROCHLORIDE 20 MG/ML
INJECTION, SOLUTION EPIDURAL; INFILTRATION; INTRACAUDAL; PERINEURAL PRN
Status: DISCONTINUED | OUTPATIENT
Start: 2021-05-02 | End: 2021-05-02 | Stop reason: SDUPTHER

## 2021-05-02 RX ORDER — MAGNESIUM HYDROXIDE 1200 MG/15ML
LIQUID ORAL CONTINUOUS PRN
Status: COMPLETED | OUTPATIENT
Start: 2021-05-02 | End: 2021-05-02

## 2021-05-02 RX ORDER — HYDROMORPHONE HCL 110MG/55ML
0.5 PATIENT CONTROLLED ANALGESIA SYRINGE INTRAVENOUS EVERY 5 MIN PRN
Status: DISCONTINUED | OUTPATIENT
Start: 2021-05-02 | End: 2021-05-02 | Stop reason: HOSPADM

## 2021-05-02 RX ORDER — FENTANYL CITRATE 50 UG/ML
INJECTION, SOLUTION INTRAMUSCULAR; INTRAVENOUS PRN
Status: DISCONTINUED | OUTPATIENT
Start: 2021-05-02 | End: 2021-05-02 | Stop reason: SDUPTHER

## 2021-05-02 RX ADMIN — LABETALOL HYDROCHLORIDE 5 MG: 5 INJECTION INTRAVENOUS at 15:30

## 2021-05-02 RX ADMIN — CEFAZOLIN SODIUM 2000 MG: 10 INJECTION, POWDER, FOR SOLUTION INTRAVENOUS at 13:22

## 2021-05-02 RX ADMIN — Medication 10 ML: at 09:47

## 2021-05-02 RX ADMIN — INSULIN LISPRO 1 UNITS: 100 INJECTION, SOLUTION INTRAVENOUS; SUBCUTANEOUS at 20:24

## 2021-05-02 RX ADMIN — FENTANYL CITRATE 25 MCG: 50 INJECTION, SOLUTION INTRAMUSCULAR; INTRAVENOUS at 14:44

## 2021-05-02 RX ADMIN — FENTANYL CITRATE 25 MCG: 50 INJECTION, SOLUTION INTRAMUSCULAR; INTRAVENOUS at 13:22

## 2021-05-02 RX ADMIN — CEFAZOLIN SODIUM 2000 MG: 10 INJECTION, POWDER, FOR SOLUTION INTRAVENOUS at 20:44

## 2021-05-02 RX ADMIN — FENTANYL CITRATE 25 MCG: 50 INJECTION, SOLUTION INTRAMUSCULAR; INTRAVENOUS at 13:15

## 2021-05-02 RX ADMIN — DOXEPIN HYDROCHLORIDE 50 MG: 25 CAPSULE ORAL at 20:21

## 2021-05-02 RX ADMIN — Medication 2 PUFF: at 21:07

## 2021-05-02 RX ADMIN — Medication 60 MG: at 13:16

## 2021-05-02 RX ADMIN — CALCIUM CARBONATE-VITAMIN D TAB 500 MG-200 UNIT 1 TABLET: 500-200 TAB at 20:21

## 2021-05-02 RX ADMIN — ONDANSETRON 4 MG: 2 INJECTION INTRAMUSCULAR; INTRAVENOUS at 13:28

## 2021-05-02 RX ADMIN — MORPHINE SULFATE 4 MG: 4 INJECTION, SOLUTION INTRAMUSCULAR; INTRAVENOUS at 20:21

## 2021-05-02 RX ADMIN — PROPOFOL 80 MG: 10 INJECTION, EMULSION INTRAVENOUS at 13:16

## 2021-05-02 RX ADMIN — ATORVASTATIN CALCIUM 10 MG: 10 TABLET, FILM COATED ORAL at 20:21

## 2021-05-02 RX ADMIN — DEXAMETHASONE SODIUM PHOSPHATE 4 MG: 4 INJECTION, SOLUTION INTRAMUSCULAR; INTRAVENOUS at 13:28

## 2021-05-02 RX ADMIN — SODIUM CHLORIDE: 9 INJECTION, SOLUTION INTRAVENOUS at 16:11

## 2021-05-02 RX ADMIN — Medication: at 20:45

## 2021-05-02 RX ADMIN — FENTANYL CITRATE 50 MCG: 50 INJECTION, SOLUTION INTRAMUSCULAR; INTRAVENOUS at 15:23

## 2021-05-02 RX ADMIN — Medication: at 11:18

## 2021-05-02 RX ADMIN — MORPHINE SULFATE 4 MG: 4 INJECTION, SOLUTION INTRAMUSCULAR; INTRAVENOUS at 06:46

## 2021-05-02 RX ADMIN — TRANEXAMIC ACID: 1 INJECTION, SOLUTION INTRAVENOUS at 14:19

## 2021-05-02 RX ADMIN — HYDROCODONE BITARTRATE AND ACETAMINOPHEN 1 TABLET: 5; 325 TABLET ORAL at 16:14

## 2021-05-02 RX ADMIN — Medication 50 MCG: at 13:15

## 2021-05-02 RX ADMIN — FENTANYL CITRATE 25 MCG: 50 INJECTION, SOLUTION INTRAMUSCULAR; INTRAVENOUS at 13:29

## 2021-05-02 RX ADMIN — LIDOCAINE HYDROCHLORIDE 50 MG: 20 INJECTION, SOLUTION EPIDURAL; INFILTRATION; INTRACAUDAL; PERINEURAL at 13:16

## 2021-05-02 ASSESSMENT — PULMONARY FUNCTION TESTS
PIF_VALUE: 2
PIF_VALUE: 18
PIF_VALUE: 17
PIF_VALUE: 3
PIF_VALUE: 17
PIF_VALUE: 18
PIF_VALUE: 17
PIF_VALUE: 17
PIF_VALUE: 1
PIF_VALUE: 2
PIF_VALUE: 24
PIF_VALUE: 18
PIF_VALUE: 16
PIF_VALUE: 36
PIF_VALUE: 18
PIF_VALUE: 17
PIF_VALUE: 16
PIF_VALUE: 17
PIF_VALUE: 14
PIF_VALUE: 19
PIF_VALUE: 17
PIF_VALUE: 2
PIF_VALUE: 17
PIF_VALUE: 18
PIF_VALUE: 16
PIF_VALUE: 2
PIF_VALUE: 17
PIF_VALUE: 18
PIF_VALUE: 16
PIF_VALUE: 18
PIF_VALUE: 17
PIF_VALUE: 17
PIF_VALUE: 18
PIF_VALUE: 17
PIF_VALUE: 20
PIF_VALUE: 17
PIF_VALUE: 17
PIF_VALUE: 16
PIF_VALUE: 16
PIF_VALUE: 17
PIF_VALUE: 14
PIF_VALUE: 16
PIF_VALUE: 19
PIF_VALUE: 18
PIF_VALUE: 18
PIF_VALUE: 17
PIF_VALUE: 19
PIF_VALUE: 17
PIF_VALUE: 18
PIF_VALUE: 19
PIF_VALUE: 17

## 2021-05-02 ASSESSMENT — PAIN SCALES - GENERAL
PAINLEVEL_OUTOF10: 7
PAINLEVEL_OUTOF10: 7
PAINLEVEL_OUTOF10: 5
PAINLEVEL_OUTOF10: 7

## 2021-05-02 ASSESSMENT — PAIN DESCRIPTION - ORIENTATION: ORIENTATION: RIGHT

## 2021-05-02 ASSESSMENT — PAIN DESCRIPTION - LOCATION
LOCATION: SHOULDER
LOCATION: KNEE

## 2021-05-02 NOTE — PROGRESS NOTES
2041: Assessment complete, VSS, pulses and sensation intact in TESFAYE LE, L knee bruised and swollen, ice applied, replaced, immobilizer loosely to LLE, facial bruising and swelling still noted, pt states pain is tolerable, declines ice pack for face, pt declines pain medication at this time, repositioned LE on pillows. 2145: , message sent to MD for insulin orders, started pt on low S/S insulin while admitted, educated pt, provided denture care, no other needs at this time. 0200: pt sleeping, no s/s of distress, will continue monitoring. 4377: pt assisted onto the bedpan, pt able to urinate and have BM, assisted with bed bath, change bed pad and gown, pt tolerated fairly well, gave morphine for pain to R shoulder and L knee, see MAR.   Maxim Ricks

## 2021-05-02 NOTE — BRIEF OP NOTE
Brief Postoperative Note      Patient: Judie Astorga  YOB: 1935  MRN: [id number]    Date of Procedure: 5/2/2021    Pre-Op Diagnosis: LEFT PATELLAR FRACTURE    Post-Op Diagnosis: Same, medial and lateral retinacular tears       Procedure(s):  PATELLA OPEN REDUCTION INTERNAL FIXATION    Surgeon(s):  Kelly Ocasio MD    Assistant:  Surgical Assistant: Aide Bailon    Anesthesia: General    Estimated Blood Loss (mL): less than 464GX    Complications: None    Specimens:   * No specimens in log *    Implants:  * No implants in log *      Drains: * No LDAs found *    Findings: comminuted patella fracture   Patellar component intact   Retinacular tears       Electronically signed by Kelly Ocasio MD on 5/2/2021 at 2:51 PM

## 2021-05-02 NOTE — PROGRESS NOTES
Shift assessment completed. VSS. Patient alert and oriented x 4. Left knee remains bruised and swollen, bilateral pedal pulses palpable, immobilizer in place. Facial bruising and swelling noted. Patient denies any further needs at this time. The care plan and education have been reviewed and mutually agreed upon with the patient. Call light in reach. Will continue to monitor.

## 2021-05-02 NOTE — PLAN OF CARE
Problem: Falls - Risk of:  Goal: Will remain free from falls  Description: Will remain free from falls  5/2/2021 0956 by Nicolle Valle RN  Outcome: Ongoing  5/2/2021 0213 by Himanshu Sequeira RN  Outcome: Ongoing  Goal: Absence of physical injury  Description: Absence of physical injury  5/2/2021 0956 by Nicolle Valle RN  Outcome: Ongoing  5/2/2021 0213 by Himanshu Sequeira RN  Outcome: Ongoing     Problem: Pain:  Goal: Pain level will decrease  Description: Pain level will decrease  5/2/2021 0956 by Nicolle Valle RN  Outcome: Ongoing  5/2/2021 0213 by Himanshu Sequeira RN  Outcome: Ongoing  Goal: Control of acute pain  Description: Control of acute pain  5/2/2021 0956 by Nicolle Valle RN  Outcome: Ongoing  5/2/2021 0213 by Himanshu Sequeira RN  Outcome: Ongoing

## 2021-05-02 NOTE — PROGRESS NOTES
Progress Note - Dr. Matheus Uribe - Internal Medicine  PCP: Oliverio Fox MD [address] [facility] / [address] [place] [address] [phone]    Hospital Day: 2  Code Status: Full Code  Current Diet: Diet NPO, After Midnight Exceptions are: Sips with Meds        CC: follow up on medical issues    Subjective: Sav Luna is a 80 y.o. female. She denies problems    Doing well  No issues  Anxious about OR    She denies chest pain, denies shortness of breath, denies nausea,  denies emesis. 10 system Review of Systems is reviewed with patient, and pertinent positives are noted in HPI above . Otherwise, Review of systems is negative. I have reviewed the patient's medical and social history in detail and updated the computerized patient record. To recap: She  has a past medical history of Arthritis, Diabetes mellitus ([facility] [address] HYPERCHOLESTERAEMIA, Hypertension, and TIA (transient ischaemic attack). . She  has a past surgical history that includes colectomy; joint replacement (04/22/2010); and Total knee arthroplasty (8/19/2010). . She  reports that she has never smoked. She has never used smokeless tobacco. She reports that she does not drink alcohol or use drugs. .        Active Hospital Problems    Diagnosis Date Noted    Right rotator cuff tear arthropathy [M75.101, M12.811] 05/01/2021    Fall from slip, trip, or stumble, initial encounter [W01. 0XXA]     Nasal fracture [S02. 2XXA] 04/30/2021    Closed comminuted fracture of left patella, initial encounter Bernardino August 04/30/2021    History of total knee arthroplasty, left [Z96.652] 04/26/2010    Controlled type 2 diabetes mellitus without complication, without long-term current use of insulin ([facility] [address] [E11.9] 04/22/2010    HTN (hypertension) [I10] 04/22/2010    Hyperlipidemia [E78.5] 04/22/2010       Current Facility-Administered Medications: pioglitazone (ACTOS) tablet 15 mg, 15 mg, Oral, Daily **AND** metFORMIN (GLUCOPHAGE) tablet 500 mg, 500 mg, Oral, Daily with breakfast  losartan (COZAAR) tablet 100 mg, 100 mg, Oral, Daily  pantoprazole (PROTONIX) tablet 40 mg, 40 mg, Oral, QAM AC  amLODIPine (NORVASC) tablet 10 mg, 10 mg, Oral, Daily  hydroCHLOROthiazide (HYDRODIURIL) tablet 25 mg, 25 mg, Oral, Daily  budesonide-formoterol (SYMBICORT) 160-4.5 MCG/ACT inhaler 2 puff, 2 puff, Inhalation, BID  calcium-vitamin D (OSCAL-500) 500-200 MG-UNIT per tablet 1 tablet, 1 tablet, Oral, BID  morphine (PF) injection 0.5 mg, 0.5 mg, Intravenous, Q4H PRN  sodium chloride (OCEAN, BABY AYR) 0.65 % nasal spray 1 spray, 1 spray, Each Nostril, Q4H PRN  calcium carbonate (TUMS) chewable tablet 500 mg, 500 mg, Oral, TID PRN  insulin lispro (1 Unit Dial) 0-6 Units, 0-6 Units, Subcutaneous, TID WC  insulin lispro (1 Unit Dial) 0-3 Units, 0-3 Units, Subcutaneous, Nightly  Vitamin D (CHOLECALCIFEROL) tablet 2,000 Units, 2,000 Units, Oral, Daily  montelukast (SINGULAIR) tablet 10 mg, 10 mg, Oral, Nightly  atorvastatin (LIPITOR) tablet 10 mg, 10 mg, Oral, Nightly  albuterol sulfate  (90 Base) MCG/ACT inhaler 2 puff, 2 puff, Inhalation, 4x Daily PRN  doxepin (SINEQUAN) capsule 50 mg, 50 mg, Oral, Nightly  benzonatate (TESSALON) capsule 200 mg, 200 mg, Oral, BID PRN  fluticasone (FLONASE) 50 MCG/ACT nasal spray 1 spray, 1 spray, Each Nostril, Daily  0.9 % sodium chloride infusion, , Intravenous, Continuous  sodium chloride flush 0.9 % injection 5-40 mL, 5-40 mL, Intravenous, 2 times per day  sodium chloride flush 0.9 % injection 10 mL, 10 mL, Intravenous, PRN  0.9 % sodium chloride infusion, 25 mL, Intravenous, PRN  [DISCONTINUED] morphine (PF) injection 2 mg, 2 mg, Intravenous, Q2H PRN **OR** morphine injection 4 mg, 4 mg, Intravenous, Q2H PRN  promethazine (PHENERGAN) tablet 12.5 mg, 12.5 mg, Oral, Q6H PRN **OR** ondansetron (ZOFRAN) injection 4 mg, 4 mg, Intravenous, Q6H PRN  magnesium hydroxide (MILK OF MAGNESIA) 400 MG/5ML suspension 30 mL, 30 mL, Oral, Daily PRN  potassium chloride (KLOR-CON M) extended release tablet 40 mEq, 40 mEq, Oral, PRN **OR** potassium bicarb-citric acid (EFFER-K) effervescent tablet 40 mEq, 40 mEq, Oral, PRN **OR** potassium chloride 10 mEq/100 mL IVPB (Peripheral Line), 10 mEq, Intravenous, PRN  HYDROcodone-acetaminophen (NORCO) 5-325 MG per tablet 1 tablet, 1 tablet, Oral, Q6H PRN         Objective:  /67   Pulse 88   Temp 98 °F (36.7 °C) (Oral)   Resp 18   Ht 4' 10\" (1.473 m)   Wt 113 lb (51.3 kg)   SpO2 93%   BMI 23.62 kg/m²      Patient Vitals for the past 24 hrs:   BP Temp Temp src Pulse Resp SpO2 Height Weight   05/02/21 0930 116/67 98 °F (36.7 °C) Oral 88 18 93 % -- --   05/02/21 0429 135/78 98.1 °F (36.7 °C) Oral 91 18 92 % -- --   05/02/21 0424 -- -- -- -- -- -- 4' 10\" (1.473 m) 113 lb (51.3 kg)   05/01/21 2030 113/61 98.1 °F (36.7 °C) Oral 93 18 92 % -- --   05/01/21 1828 -- -- -- -- -- 95 % -- --   05/01/21 1700 130/72 98 °F (36.7 °C) Oral 90 16 94 % -- --     Patient Vitals for the past 96 hrs (Last 3 readings):   Weight   05/02/21 0424 113 lb (51.3 kg)   04/30/21 1806 113 lb (51.3 kg)         No intake or output data in the 24 hours ending 05/02/21 1019      Physical Exam:   /67   Pulse 88   Temp 98 °F (36.7 °C) (Oral)   Resp 18   Ht 4' 10\" (1.473 m)   Wt 113 lb (51.3 kg)   SpO2 93%   BMI 23.62 kg/m²   General appearance: alert, appears stated age and cooperative  Head: Normocephalic, without obvious abnormality, atraumatic  Lungs: clear to auscultation bilaterally  Heart: regular rate and rhythm, S1, S2 normal, no murmur, click, rub or gallop  Abdomen: soft, non-tender; bowel sounds normal; no masses,  no organomegaly  Extremities: extremities normal, atraumatic, no cyanosis or edema    Labs:  Lab Results   Component Value Date    WBC 7.8 05/02/2021    HGB 11.4 (L) 05/02/2021    HCT 34.5 (L) 05/02/2021     05/02/2021    CHOL 158 02/20/2021    TRIG 57 02/20/2021    HDL 81 (H) 02/20/2021    ALT 15 05/01/2021    AST 19 05/01/2021     (L) 05/02/2021    K 4.0 05/02/2021    CL 93 (L) 05/02/2021    CREATININE 0.6 05/02/2021    BUN 19 05/02/2021    CO2 31 05/02/2021    TSH 4.01 02/20/2021    INR 1.03 04/30/2021    LABA1C 6.6 04/22/2021    LABMICR <1.20 02/20/2021     No results found for: CKTOTAL, CKMB, CKMBINDEX, TROPONINI    Recent Imaging Results are Reviewed:  Xr Chest (2 Vw)    Result Date: 4/22/2021  EXAMINATION: TWO XRAY VIEWS OF THE CHEST 4/22/2021 3:09 pm COMPARISON: 09/12/2019 HISTORY: ORDERING SYSTEM PROVIDED HISTORY: Chronic cough TECHNOLOGIST PROVIDED HISTORY: Reason for Exam: Chronic cough x 1 month Acuity: Acute Type of Exam: Initial FINDINGS: Heart size is normal.  Some increased opacity is present in the lower right hilum. Appearance is more prominent than on the prior study. No acute airspace disease present. No abnormal vascular congestion. Mild to moderate bullous changes. No pleural effusion. Changes of cuff arthropathy in the right shoulder consistent with full-thickness rotator cuff tear. Prominent increased density in the lower right hilum. Appearance could be due to a perihilar mass but is probably due to calcified lymph nodes. RECOMMENDATION: CT chest with contrast recommended for further evaluation. Xr Shoulder Right (min 2 Views)    Result Date: 4/30/2021  EXAMINATION: THREE XRAY VIEWS OF THE RIGHT SHOULDER 4/30/2021 6:30 pm COMPARISON: None. HISTORY: ORDERING SYSTEM PROVIDED HISTORY: Injury TECHNOLOGIST PROVIDED HISTORY: Reason for exam:->Injury Reason for Exam: Fall Acuity: Acute Type of Exam: Initial FINDINGS: There are severe degenerative changes in the glenohumeral joint. There is elevation of the humeral head consistent with chronic rotator cuff tear. There is no evidence of fracture, dislocation, or other acute osseous abnormality. IMPRESSION No acute osseous abnormality.      Xr Knee Left (3 Views)    Result Date: 4/30/2021  EXAMINATION: THREE XRAY VIEWS OF THE LEFT KNEE 4/30/2021 6:30 pm COMPARISON: None. HISTORY: ORDERING SYSTEM PROVIDED HISTORY: fall, pain with movement, bruising/swelling TECHNOLOGIST PROVIDED HISTORY: Reason for exam:->fall, pain with movement, bruising/swelling Reason for Exam: Fall Acuity: Acute Type of Exam: Initial FINDINGS: The patient is status post total knee arthroplasty. There is a transverse fracture through the midbody of the patella. There is mild displacement. There is an associated joint effusion. Acute transverse fracture through the midbody of the patella. Ct Head Wo Contrast    Result Date: 4/30/2021  EXAMINATION: CT OF THE HEAD WITHOUT CONTRAST; CT OF THE FACE WITHOUT CONTRAST; CT OF THE CERVICAL SPINE WITHOUT CONTRAST  4/30/2021 6:40 pm TECHNIQUE: CT of the head was performed without the administration of intravenous contrast. Dose modulation, iterative reconstruction, and/or weight based adjustment of the mA/kV was utilized to reduce the radiation dose to as low as reasonably achievable.; CT of the face was performed without the administration of intravenous contrast. Multiplanar reformatted images are provided for review. Dose modulation, iterative reconstruction, and/or weight based adjustment of the mA/kV was utilized to reduce the radiation dose to as low as reasonably achievable.; CT of the cervical spine was performed without the administration of intravenous contrast. Multiplanar reformatted images are provided for review. Dose modulation, iterative reconstruction, and/or weight based adjustment of the mA/kV was utilized to reduce the radiation dose to as low as reasonably achievable. COMPARISON: None. HISTORY: ORDERING SYSTEM PROVIDED HISTORY: fall, bloody nose, pain to nasal bridge TECHNOLOGIST PROVIDED HISTORY: Reason for exam:->fall, bloody nose, pain to nasal bridge Has a \"code stroke\" or \"stroke alert\" been called? ->No Decision Support Exception - unselect if not a suspected or confirmed emergency medical condition->Emergency Medical Condition (MA) Reason for Exam: Fall (Pt states she was turning the light on at home, fell forward hitting left knee and right shoulder, slipped over slippers, no LOC, landed on head, 81 mg ASA daily. (Dr. Nely Tirado mother)) FINDINGS: . BRAIN AND VENTRICLES: The ventricles are midline and symmetric. There is no evidence of intracranial hemorrhage, focal mass lesion, or other acute intracranial abnormality. SKULL: There is no evidence of calvarial fracture. C-SPINE: Vertebral body height and alignment is maintained. There is no evidence of fracture or other acute osseous abnormality. There is moderate to severe multilevel degenerative change. FACIAL BONES: There are minimally displaced fractures of the nasal bones with deviation to the left. The nasal septum is deviated as well. 1. No acute traumatic intracranial abnormality. 2. No traumatic abnormality of the cervical spine. 3. Bilateral nasal bone fractures with deviation to the left     Ct Facial Bones Wo Contrast    Result Date: 4/30/2021  EXAMINATION: CT OF THE HEAD WITHOUT CONTRAST; CT OF THE FACE WITHOUT CONTRAST; CT OF THE CERVICAL SPINE WITHOUT CONTRAST  4/30/2021 6:40 pm TECHNIQUE: CT of the head was performed without the administration of intravenous contrast. Dose modulation, iterative reconstruction, and/or weight based adjustment of the mA/kV was utilized to reduce the radiation dose to as low as reasonably achievable.; CT of the face was performed without the administration of intravenous contrast. Multiplanar reformatted images are provided for review. Dose modulation, iterative reconstruction, and/or weight based adjustment of the mA/kV was utilized to reduce the radiation dose to as low as reasonably achievable.; CT of the cervical spine was performed without the administration of intravenous contrast. Multiplanar reformatted images are provided for review.  Dose modulation, iterative reconstruction, and/or weight based adjustment of the mA/kV was utilized to reduce the radiation dose to as low as reasonably achievable. COMPARISON: None. HISTORY: ORDERING SYSTEM PROVIDED HISTORY: fall, bloody nose, pain to nasal bridge TECHNOLOGIST PROVIDED HISTORY: Reason for exam:->fall, bloody nose, pain to nasal bridge Has a \"code stroke\" or \"stroke alert\" been called? ->No Decision Support Exception - unselect if not a suspected or confirmed emergency medical condition->Emergency Medical Condition (MA) Reason for Exam: Fall (Pt states she was turning the light on at home, fell forward hitting left knee and right shoulder, slipped over slippers, no LOC, landed on head, 81 mg ASA daily. (Dr. Nickie Flor mother)) FINDINGS: . BRAIN AND VENTRICLES: The ventricles are midline and symmetric. There is no evidence of intracranial hemorrhage, focal mass lesion, or other acute intracranial abnormality. SKULL: There is no evidence of calvarial fracture. C-SPINE: Vertebral body height and alignment is maintained. There is no evidence of fracture or other acute osseous abnormality. There is moderate to severe multilevel degenerative change. FACIAL BONES: There are minimally displaced fractures of the nasal bones with deviation to the left. The nasal septum is deviated as well. 1. No acute traumatic intracranial abnormality. 2. No traumatic abnormality of the cervical spine. 3. Bilateral nasal bone fractures with deviation to the left     Ct Chest W Contrast    Result Date: 4/22/2021  EXAMINATION: CT OF THE CHEST WITH CONTRAST 4/22/2021 7:40 pm TECHNIQUE: CT of the chest was performed with the administration of intravenous contrast. Multiplanar reformatted images are provided for review. Dose modulation, iterative reconstruction, and/or weight based adjustment of the mA/kV was utilized to reduce the radiation dose to as low as reasonably achievable. COMPARISON: Chest x-ray performed earlier today.  HISTORY: ORDERING SYSTEM PROVIDED HISTORY: Exertional reconstruction, and/or weight based adjustment of the mA/kV was utilized to reduce the radiation dose to as low as reasonably achievable.; CT of the face was performed without the administration of intravenous contrast. Multiplanar reformatted images are provided for review. Dose modulation, iterative reconstruction, and/or weight based adjustment of the mA/kV was utilized to reduce the radiation dose to as low as reasonably achievable.; CT of the cervical spine was performed without the administration of intravenous contrast. Multiplanar reformatted images are provided for review. Dose modulation, iterative reconstruction, and/or weight based adjustment of the mA/kV was utilized to reduce the radiation dose to as low as reasonably achievable. COMPARISON: None. HISTORY: ORDERING SYSTEM PROVIDED HISTORY: fall, bloody nose, pain to nasal bridge TECHNOLOGIST PROVIDED HISTORY: Reason for exam:->fall, bloody nose, pain to nasal bridge Has a \"code stroke\" or \"stroke alert\" been called? ->No Decision Support Exception - unselect if not a suspected or confirmed emergency medical condition->Emergency Medical Condition (MA) Reason for Exam: Fall (Pt states she was turning the light on at home, fell forward hitting left knee and right shoulder, slipped over slippers, no LOC, landed on head, 81 mg ASA daily. (Dr. Star Betancur mother)) FINDINGS: . BRAIN AND VENTRICLES: The ventricles are midline and symmetric. There is no evidence of intracranial hemorrhage, focal mass lesion, or other acute intracranial abnormality. SKULL: There is no evidence of calvarial fracture. C-SPINE: Vertebral body height and alignment is maintained. There is no evidence of fracture or other acute osseous abnormality. There is moderate to severe multilevel degenerative change. FACIAL BONES: There are minimally displaced fractures of the nasal bones with deviation to the left. The nasal septum is deviated as well.      1. No acute traumatic intracranial abnormality. 2. No traumatic abnormality of the cervical spine. 3. Bilateral nasal bone fractures with deviation to the left     Ct Knee Left Wo Contrast    Result Date: 4/30/2021  EXAMINATION: CT OF THE LEFT KNEE WITHOUT CONTRAST 4/30/2021 7:49 pm TECHNIQUE: CT of the left knee was performed without the administration of intravenous contrast.  Multiplanar reformatted images are provided for review. Dose modulation, iterative reconstruction, and/or weight based adjustment of the mA/kV was utilized to reduce the radiation dose to as low as reasonably achievable. COMPARISON: Left knee radiographs 04/30/2021. HISTORY ORDERING SYSTEM PROVIDED HISTORY: patella fracture seen on xr, tibial plateu fracture? pt fell onto knee TECHNOLOGIST PROVIDED HISTORY: Reason for exam:->patella fracture seen on xr, tibial plateu fracture? pt fell onto knee Decision Support Exception - unselect if not a suspected or confirmed emergency medical condition->Emergency Medical Condition (MA) Reason for Exam: Fall (Pt states she was turning the light on at home, fell forward hitting left knee and right shoulder, slipped over slippers, no LOC, landed on head, 81 mg ASA daily. (Dr. Nickie Flor mother)) Acuity: Acute Type of Exam: Initial FINDINGS: Bones: There is an acute comminuted distracted fracture of the mid patella. No other fracture identified. No dislocation. Soft Tissue: The extensor mechanism is grossly intact. Anterior subcutaneous edema. No soft tissue gas or foreign body. Atherosclerotic vascular calcifications. Joint:  Status post left total knee arthroplasty. The hardware is appropriately positioned and intact without abnormal interface widening. Moderate joint effusion. 1. Acute comminuted distracted fracture of the mid patella. 2. Status post left total knee arthroplasty. 3. Moderate joint effusion.        Assessment and Plan:  Principal Problem:    Closed comminuted fracture of left patella, initial encounter -Established problem. Stable. Plan: for OR today per dr Brent Acosta  Active Problems:    Controlled type 2 diabetes mellitus without complication, without long-term current use of insulin ([address] -Established problem. Stable. Plan: resume ccc diet post op    HTN (hypertension) -Established problem. Stable. 116/67  Plan: stay on same meds    Hyperlipidemia  Plan: Continue present orders/plan. History of total knee arthroplasty, left    Nasal fracture -Established problem. Stable. No issues with  Breathing.  Pt declined straightening of her nasal bones while she is undergoing anesthesia for her patellar fracture   Plan: outpt ENT eval recommended    Right rotator cuff tear arthropathy    Fall from slip, trip, or stumble, initial encounter      [facility]  5/2/2021

## 2021-05-02 NOTE — PROGRESS NOTES
Physical Therapy  Patient suffered a left patella fx and is scheduled for patellectomy vs. ORIF today, 5/2/2021. Will hold therapy until post surgery.   Bruno Nick, [address] [facility], DPT, ATC-R [id number]    [facility]   [id number]

## 2021-05-02 NOTE — PROGRESS NOTES
Patient returned to unit from PACU. VSS. Patient able to wiggle toes of left foot, pedal pulse palpable and immobilizer in place. Patient reports pain of 8/10, prn medication given, see MAR. Family sitting at bedside. Call light in reach. Will continue to monitor.

## 2021-05-02 NOTE — ANESTHESIA POSTPROCEDURE EVALUATION
Department of Anesthesiology  Postprocedure Note    Patient: Ponciano Councilman  MRN: [id number]  YOB: 1935  Date of evaluation: 5/2/2021  Time:  3:11 PM     Procedure Summary     Date: 05/02/21 Room / Location: [address]    Anesthesia Start: 1091 Anesthesia Stop: 5391    Procedure: PATELLA OPEN REDUCTION INTERNAL FIXATION (Left ) Diagnosis: (LEFT PATELLAR FRACTURE)    Surgeons: Tony Reeder MD Responsible Provider: Robby Recio MD    Anesthesia Type: general ASA Status: 2          Anesthesia Type: general    Lex Phase I: Lex Score: 8    Lex Phase II:      Last vitals: Reviewed and per EMR flowsheets.        Anesthesia Post Evaluation    Level of consciousness: awake  Complications: no

## 2021-05-02 NOTE — PROGRESS NOTES
Pt admitted to PACU, awakening and following commands, + pedal pulses, neurovascular status intact, vss, O2 saturations stable on 6LNC, immobilizer in place with benito CD&I

## 2021-05-02 NOTE — ANESTHESIA PRE PROCEDURE
Department of Anesthesiology  Preprocedure Note       Name:  Marlen Lawson   Age:  80 y.o.  :  1935                                          MRN:  [id number]         Date:  2021      Surgeon: Hue Perry):  Lora Londono MD    Procedure: Procedure(s):  PATELLA OPEN REDUCTION INTERNAL FIXATION    Medications prior to admission:   Prior to Admission medications    Medication Sig Start Date End Date Taking?  Authorizing Provider   Carboxymethylcellul-Glycerin (REFRESH OPTIVE) 0.5-0.9 % SOLN Apply 1 drop to eye nightly Both eyes nightly 21  Yes Historical Provider, MD   Biotin 1000 MCG TABS Take 1 tablet by mouth daily   Yes Historical Provider, MD   vitamin B-12 (CYANOCOBALAMIN) 1000 MCG tablet Take 1,000 mcg by mouth daily   Yes Historical Provider, MD   pioglitazone-metFORMIN (ACTOPLUS MET)  MG per tablet Take 1 tablet by mouth daily 21 Yes Salma Chaidez MD   olmesartan-amLODIPine-HCTZ 40-10-25 MG TABS Take 1 tablet by mouth daily 21 Yes Salma Chaidez MD   doxepin (SINEQUAN) 50 MG capsule Take 1 capsule by mouth nightly 21  Yes Salma Chaidez MD   omeprazole (PRILOSEC) 40 MG delayed release capsule Take 1 capsule by mouth daily 21 Yes Salma Chaidez MD   budesonide-formoterol (SYMBICORT) 160-4.5 MCG/ACT AERO Inhale 2 puffs into the lungs 2 times daily 21  Yes Salma Chaidez MD   fluticasone (FLONASE) 50 MCG/ACT nasal spray 1 spray by Each Nostril route daily 21 Yes Salma Chaidez MD   atorvastatin (LIPITOR) 10 MG tablet Take 1 tablet by mouth daily 21 Yes Salma Chaidez MD   aspirin 81 MG EC tablet Take 1 tablet by mouth daily 21 Yes Salma Chaidez MD   albuterol sulfate HFA (VENTOLIN HFA) 108 (90 Base) MCG/ACT inhaler Inhale 2 puffs into the lungs 4 times daily as needed for Wheezing 21  Yes MD stuart Interiano (SINGULAIR) 10 MG tablet Take 1 tablet by mouth daily 8/16/20  Yes Oliverio Fox MD   blood glucose monitor kit and supplies Test 2 times a day & as needed for symptoms of irregular blood glucose. 10/28/19  Yes Oliverio Fox MD   blood glucose monitor strips Test 1 times a day & as needed for symptoms of irregular blood glucose. 10/28/19  Yes Oliverio Fox MD   Lancets MISC 1 each by Does not apply route 2 times daily 10/1/19  Yes Oliverio Fox MD   Alcohol Swabs PADS 1 each by Does not apply route daily 9/28/19  Yes Oliverio Fox MD   Cholecalciferol (VITAMIN D3) 2000 units CAPS Take 1 capsule by mouth daily 9/4/19  Yes Oliverio Fox MD   fluticasone-vilanterol (BREO ELLIPTA) 100-25 MCG/INH AEPB inhaler Inhale 2 puffs into the lungs daily 9/4/19  Yes Oliverio Fox MD   blood glucose test strips (ASCENSIA AUTODISC VI;ONE TOUCH ULTRA TEST VI) strip 1 each by In Vitro route daily As needed. 8/23/18  Yes Oliverio Fox MD   calcium carbonate-vitamin D (CALCIUM 600 + D) 600-400 MG-UNIT TABS per tab Take 1 tablet by mouth daily  Patient taking differently: Take 1 tablet by mouth 2 times daily  11/10/17  Yes Oliverio Fox MD   [employer] (POLIDENT 3 MINUTE) TBEF 1 tablet by Does not apply route daily 8/7/17  Yes Oliverio Fox MD   Multiple Vitamin (MULTIVITAMIN PO) Take 1 tablet by mouth daily.    Yes Historical Provider, MD   benzonatate (TESSALON) 200 MG capsule Take 1 capsule by mouth 2 times daily as needed for Cough 4/22/21 5/22/21  Oliverio Fox MD   Cholecalciferol (VITAMIN D3) 50 MCG (2000 UT) TABS Take 1 tablet by mouth daily 4/22/21 7/21/21  Oliverio Fox MD   albuterol sulfate  (90 Base) MCG/ACT inhaler Inhale 2 puffs into the lungs every 6 hours as needed for Wheezing 4/22/21   Oliverio Fox MD       Current medications:    Current Facility-Administered Medications   Medication Dose Route Frequency Provider Last Rate Last Admin    meperidine (DEMEROL) injection 12.5 mg  12.5 mg Intravenous Q5 Min PRSELMA Pastor mg at 05/01/21 2021    insulin lispro (1 Unit Dial) 0-6 Units  0-6 Units Subcutaneous TID WC Jaspal Woods MD        insulin lispro (1 Unit Dial) 0-3 Units  0-3 Units Subcutaneous Nightly Jaspal Woods MD   1 Units at 05/01/21 2319    Vitamin D (CHOLECALCIFEROL) tablet 2,000 Units  2,000 Units Oral Daily Jaspal Woods MD   Stopped at 05/02/21 0817    montelukast (SINGULAIR) tablet 10 mg  10 mg Oral Nightly Jaspal Woods MD        atorvastatin (LIPITOR) tablet 10 mg  10 mg Oral Nightly Jaspal Woods MD   10 mg at 05/01/21 2021    albuterol sulfate  (90 Base) MCG/ACT inhaler 2 puff  2 puff Inhalation 4x Daily PRN Jaspal Woods MD        do[facility]epin [facility] capsule 50 mg  50 mg Oral Nightly Jaspal Woods MD   50 mg at 05/01/21 2021    benzonatate (TESSALON) capsule 200 mg  200 mg Oral BID PRN Jaspal Woods MD        fluticasone Carl Sport) 50 MCG/ACT nasal spray 1 spray  1 spray Each Nostril Daily Jaspal Woods MD   1 spray at 05/01/21 0842    0.9 % sodium chloride infusion   Intravenous Continuous Jaspal Woods MD        sodium chloride flush 0.9 % injection 5-40 mL  5-40 mL Intravenous 2 times per day Jaspal Woods MD   10 mL at 05/02/21 0947    sodium chloride flush 0.9 % injection 10 mL  10 mL Intravenous PRN Jaspal Woods MD        0.9 % sodium chloride infusion  25 mL Intravenous PRN Jaspal Woods MD        morphine injection 4 mg  4 mg Intravenous Q2H PRN Jaspal Woods MD   4 mg at 05/02/21 0646    promethazine (PHENERGAN) tablet 12.5 mg  12.5 mg Oral Q6H PRN Jaspal Woods MD        Or    ondansetron [facility]) injection 4 mg  4 mg Intravenous Q6H PRN Jaspal Woods MD        magnesium hydroxide (MILK OF MAGNESIA) 400 MG/5ML suspension 30 mL  30 mL Oral Daily PRN Jaspal Woods MD        potassium chloride (KLOR-CON M) extended release tablet 40 mEq  40 mEq Oral PRN Jaspal Woods MD        Or    potassium bicarb-citric acid (EFFER-K) effervescent tablet 40 mEq  40 mEq Oral PRN Deepti Grissom MD        Or    potassium chloride 10 mEq/100 mL IVPB (Peripheral Line)  10 mEq Intravenous PRN Deepti Grissom MD        HYDROcodone-acetaminophen [facility]) 5-325 MG per tablet 1 tablet  1 tablet Oral Q6H PRN Deepti Grissom MD   1 tablet at 05/01/21 1521       Allergies: Allergies   Allergen Reactions    Percocet [Oxycodone-Acetaminophen] Rash     Not sure but thinks it was this drug       Problem List:    Patient Active Problem List   Diagnosis Code    Osteoarthritis of left knee M17.12    Controlled type 2 diabetes mellitus without complication, without long-term current use of insulin ([facility]) E11.9    HTN (hypertension) I10    ASCVD (arteriosclerotic cardiovascular disease) I25.10    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 2XXA    Closed comminuted fracture of left patella, initial encounter S82.042A    Right rotator cuff tear arthropathy M75.101, M12.811    Fall from slip, trip, or stumble, initial encounter W01. [email or web address]       Past Medical History:        Diagnosis Date    Arthritis     Diabetes mellitus ([address]     HYPERCHOLESTERAEMIA     Hypertension     TIA (transient ischaemic attack) 2003       Past Surgical History:        Procedure Laterality Date    COLECTOMY      JOINT REPLACEMENT  04/22/2010    left knee    TOTAL KNEE ARTHROPLASTY  8/19/2010    right, cemented       Social History:    Social History     Tobacco Use    Smoking status: Never Smoker    Smokeless tobacco: Never Used   Substance Use Topics    Alcohol use:  No                                Counseling given: Not Answered      Vital Signs (Current):   Vitals:    05/02/21 0424 05/02/21 0429 05/02/21 0930 05/02/21 1132   BP:  135/78 116/67 (!) 130/37   Pulse:  91 88 88   Resp:  18 18 16   Temp:  98.1 °F (36.7 °C) 98 °F (36.7 °C) 98.3 °F (36.8 °C)   TempSrc:  Oral Oral Temporal   SpO2:  92% 93% 96%   Weight: 113 lb (51.3 kg)      Height: 4' 10\" (1.473 m)                                                 BP Readings from Last 3 Encounters:   05/02/21 (!) 130/37   04/22/21 120/68   01/25/21 126/84       NPO Status: Time of last liquid consumption: 2000                        Time of last solid consumption: 2000                        Date of last liquid consumption: 05/01/21                        Date of last solid food consumption: 05/01/21    BMI:   Wt Readings from Last 3 Encounters:   05/02/21 113 lb (51.3 kg)   04/22/21 113 lb (51.3 kg)   09/24/20 108 lb (49 kg)     Body mass index is 23.62 kg/m².     CBC:   Lab Results   Component Value Date    WBC 7.8 05/02/2021    RBC 3.93 05/02/2021    HGB 11.4 05/02/2021    HCT 34.5 05/02/2021    MCV 87.7 05/02/2021    RDW 14.1 05/02/2021     05/02/2021       CMP:   Lab Results   Component Value Date     05/02/2021    K 4.0 05/02/2021    K 3.9 05/01/2021    CL 93 05/02/2021    CO2 31 05/02/2021    BUN 19 05/02/2021    CREATININE 0.6 05/02/2021    GFRAA >60 05/02/2021    GFRAA >60 08/20/2010    AGRATIO 1.6 05/01/2021    LABGLOM >60 05/02/2021    GLUCOSE 130 05/02/2021    PROT 6.5 05/01/2021    PROT 7.1 05/17/2010    CALCIUM 8.6 05/02/2021    BILITOT 0.3 05/01/2021    ALKPHOS 61 05/01/2021    AST 19 05/01/2021    ALT 15 05/01/2021       POC Tests:   Recent Labs     05/02/21  1125   POCGLU 97       Coags:   Lab Results   Component Value Date    PROTIME 11.9 04/30/2021    INR 1.03 04/30/2021    APTT 33.1 05/02/2021       HCG (If Applicable): No results found for: PREGTESTUR, PREGSERUM, HCG, HCGQUANT     ABGs: No results found for: PHART, PO2ART, RTY1QWY, UGN3XPQ, BEART, F7OLAFKF     Type & Screen (If Applicable):  Lab Results   Component Value Date LABABO A 08/19/2010    LABRH Positive 08/19/2010       Drug/Infectious Status (If Applicable):  No results found for: HIV, HEPCAB    COVID-19 Screening (If Applicable):   Lab Results   Component Value Date    COVID19 Not Detected 05/01/2021           Anesthesia Evaluation    Airway: Mallampati: II  TM distance: >3 FB   Neck ROM: full  Mouth opening: > = 3 FB Dental:          Pulmonary:                              Cardiovascular:    (+) hypertension:,         Rhythm: regular  Rate: normal                    Neuro/Psych:   (+) neuromuscular disease:, TIA,             GI/Hepatic/Renal:             Endo/Other:    (+) Diabetes, . Abdominal:           Vascular:                                        Anesthesia Plan      general     ASA 2       Induction: intravenous. Anesthetic plan and risks discussed with patient. Plan discussed with CRNA.                   Marcela Workman MD   5/2/2021

## 2021-05-03 ENCOUNTER — APPOINTMENT (OUTPATIENT)
Dept: GENERAL RADIOLOGY | Age: 86
DRG: 516 | End: 2021-05-03
Payer: MEDICARE

## 2021-05-03 LAB
ANION GAP SERPL CALCULATED.3IONS-SCNC: 9 MMOL/L (ref 3–16)
BUN BLDV-MCNC: 13 MG/DL (ref 7–20)
CALCIUM SERPL-MCNC: 8 MG/DL (ref 8.3–10.6)
CHLORIDE BLD-SCNC: 97 MMOL/L (ref 99–110)
CO2: 26 MMOL/L (ref 21–32)
CREAT SERPL-MCNC: 0.6 MG/DL (ref 0.6–1.2)
GFR AFRICAN AMERICAN: >60
GFR NON-AFRICAN AMERICAN: >60
GLUCOSE BLD-MCNC: 132 MG/DL (ref 70–99)
GLUCOSE BLD-MCNC: 163 MG/DL (ref 70–99)
GLUCOSE BLD-MCNC: 168 MG/DL (ref 70–99)
GLUCOSE BLD-MCNC: 171 MG/DL (ref 70–99)
GLUCOSE BLD-MCNC: 215 MG/DL (ref 70–99)
HCT VFR BLD CALC: 31.5 % (ref 36–48)
HEMOGLOBIN: 10.5 G/DL (ref 12–16)
PERFORMED ON: ABNORMAL
PLATELET # BLD: 201 K/UL (ref 135–450)
POTASSIUM SERPL-SCNC: 3.5 MMOL/L (ref 3.5–5.1)
SODIUM BLD-SCNC: 132 MMOL/L (ref 136–145)
WBC # BLD: 10 K/UL (ref 4–11)

## 2021-05-03 PROCEDURE — 2580000003 HC RX 258: Performed by: INTERNAL MEDICINE

## 2021-05-03 PROCEDURE — 6360000002 HC RX W HCPCS: Performed by: ORTHOPAEDIC SURGERY

## 2021-05-03 PROCEDURE — 97530 THERAPEUTIC ACTIVITIES: CPT

## 2021-05-03 PROCEDURE — 85018 HEMOGLOBIN: CPT

## 2021-05-03 PROCEDURE — 36415 COLL VENOUS BLD VENIPUNCTURE: CPT

## 2021-05-03 PROCEDURE — 2580000003 HC RX 258: Performed by: ORTHOPAEDIC SURGERY

## 2021-05-03 PROCEDURE — 85048 AUTOMATED LEUKOCYTE COUNT: CPT

## 2021-05-03 PROCEDURE — 6360000002 HC RX W HCPCS: Performed by: INTERNAL MEDICINE

## 2021-05-03 PROCEDURE — 94761 N-INVAS EAR/PLS OXIMETRY MLT: CPT

## 2021-05-03 PROCEDURE — 97535 SELF CARE MNGMENT TRAINING: CPT

## 2021-05-03 PROCEDURE — 99024 POSTOP FOLLOW-UP VISIT: CPT | Performed by: NURSE PRACTITIONER

## 2021-05-03 PROCEDURE — 94640 AIRWAY INHALATION TREATMENT: CPT

## 2021-05-03 PROCEDURE — 1200000000 HC SEMI PRIVATE

## 2021-05-03 PROCEDURE — 6370000000 HC RX 637 (ALT 250 FOR IP): Performed by: INTERNAL MEDICINE

## 2021-05-03 PROCEDURE — 85049 AUTOMATED PLATELET COUNT: CPT

## 2021-05-03 PROCEDURE — APPNB45 APP NON BILLABLE 31-45 MINUTES: Performed by: NURSE PRACTITIONER

## 2021-05-03 PROCEDURE — 6370000000 HC RX 637 (ALT 250 FOR IP): Performed by: NURSE PRACTITIONER

## 2021-05-03 PROCEDURE — 80048 BASIC METABOLIC PNL TOTAL CA: CPT

## 2021-05-03 PROCEDURE — 85014 HEMATOCRIT: CPT

## 2021-05-03 PROCEDURE — 97162 PT EVAL MOD COMPLEX 30 MIN: CPT

## 2021-05-03 PROCEDURE — 97165 OT EVAL LOW COMPLEX 30 MIN: CPT

## 2021-05-03 PROCEDURE — 6370000000 HC RX 637 (ALT 250 FOR IP): Performed by: ORTHOPAEDIC SURGERY

## 2021-05-03 PROCEDURE — 71045 X-RAY EXAM CHEST 1 VIEW: CPT

## 2021-05-03 PROCEDURE — 94150 VITAL CAPACITY TEST: CPT

## 2021-05-03 PROCEDURE — 87040 BLOOD CULTURE FOR BACTERIA: CPT

## 2021-05-03 PROCEDURE — 2700000000 HC OXYGEN THERAPY PER DAY

## 2021-05-03 PROCEDURE — 83036 HEMOGLOBIN GLYCOSYLATED A1C: CPT

## 2021-05-03 RX ORDER — DEXTROSE MONOHYDRATE 50 MG/ML
100 INJECTION, SOLUTION INTRAVENOUS PRN
Status: DISCONTINUED | OUTPATIENT
Start: 2021-05-03 | End: 2021-05-06 | Stop reason: HOSPADM

## 2021-05-03 RX ORDER — HYDROCODONE BITARTRATE AND ACETAMINOPHEN 5; 325 MG/1; MG/1
2 TABLET ORAL EVERY 4 HOURS PRN
Status: DISCONTINUED | OUTPATIENT
Start: 2021-05-03 | End: 2021-05-06 | Stop reason: HOSPADM

## 2021-05-03 RX ORDER — HYDROCODONE BITARTRATE AND ACETAMINOPHEN 5; 325 MG/1; MG/1
1-2 TABLET ORAL EVERY 6 HOURS PRN
Qty: 42 TABLET | Refills: 0 | Status: SHIPPED | OUTPATIENT
Start: 2021-05-03 | End: 2021-05-10

## 2021-05-03 RX ORDER — IPRATROPIUM BROMIDE AND ALBUTEROL SULFATE 2.5; .5 MG/3ML; MG/3ML
1 SOLUTION RESPIRATORY (INHALATION) EVERY 4 HOURS PRN
Status: DISCONTINUED | OUTPATIENT
Start: 2021-05-03 | End: 2021-05-06 | Stop reason: HOSPADM

## 2021-05-03 RX ORDER — HYDROCODONE BITARTRATE AND ACETAMINOPHEN 5; 325 MG/1; MG/1
1 TABLET ORAL EVERY 4 HOURS PRN
Status: DISCONTINUED | OUTPATIENT
Start: 2021-05-03 | End: 2021-05-06 | Stop reason: HOSPADM

## 2021-05-03 RX ORDER — DEXTROSE MONOHYDRATE 25 G/50ML
12.5 INJECTION, SOLUTION INTRAVENOUS PRN
Status: DISCONTINUED | OUTPATIENT
Start: 2021-05-03 | End: 2021-05-06 | Stop reason: HOSPADM

## 2021-05-03 RX ORDER — NICOTINE POLACRILEX 4 MG
15 LOZENGE BUCCAL PRN
Status: DISCONTINUED | OUTPATIENT
Start: 2021-05-03 | End: 2021-05-06 | Stop reason: HOSPADM

## 2021-05-03 RX ORDER — ASPIRIN 81 MG/1
81 TABLET ORAL 2 TIMES DAILY
Qty: 60 TABLET | Refills: 0 | Status: SHIPPED | OUTPATIENT
Start: 2021-05-03 | End: 2021-06-02

## 2021-05-03 RX ORDER — INSULIN LISPRO 100 [IU]/ML
0-12 INJECTION, SOLUTION INTRAVENOUS; SUBCUTANEOUS
Status: DISCONTINUED | OUTPATIENT
Start: 2021-05-03 | End: 2021-05-06 | Stop reason: HOSPADM

## 2021-05-03 RX ORDER — INSULIN LISPRO 100 [IU]/ML
0-6 INJECTION, SOLUTION INTRAVENOUS; SUBCUTANEOUS NIGHTLY
Status: DISCONTINUED | OUTPATIENT
Start: 2021-05-03 | End: 2021-05-06 | Stop reason: HOSPADM

## 2021-05-03 RX ORDER — ACETAMINOPHEN 325 MG/1
325 TABLET ORAL EVERY 4 HOURS PRN
Status: DISCONTINUED | OUTPATIENT
Start: 2021-05-03 | End: 2021-05-06 | Stop reason: HOSPADM

## 2021-05-03 RX ADMIN — DOXEPIN HYDROCHLORIDE 50 MG: 25 CAPSULE ORAL at 22:07

## 2021-05-03 RX ADMIN — Medication 2 PUFF: at 18:08

## 2021-05-03 RX ADMIN — ATORVASTATIN CALCIUM 10 MG: 10 TABLET, FILM COATED ORAL at 22:06

## 2021-05-03 RX ADMIN — MORPHINE SULFATE 4 MG: 4 INJECTION, SOLUTION INTRAMUSCULAR; INTRAVENOUS at 04:21

## 2021-05-03 RX ADMIN — MORPHINE SULFATE 4 MG: 4 INJECTION, SOLUTION INTRAMUSCULAR; INTRAVENOUS at 10:20

## 2021-05-03 RX ADMIN — ENOXAPARIN SODIUM 30 MG: 30 INJECTION SUBCUTANEOUS at 09:25

## 2021-05-03 RX ADMIN — HYDROCODONE BITARTRATE AND ACETAMINOPHEN 2 TABLET: 5; 325 TABLET ORAL at 22:06

## 2021-05-03 RX ADMIN — CEFAZOLIN SODIUM 2000 MG: 10 INJECTION, POWDER, FOR SOLUTION INTRAVENOUS at 04:27

## 2021-05-03 RX ADMIN — HYDROCODONE BITARTRATE AND ACETAMINOPHEN 1 TABLET: 5; 325 TABLET ORAL at 06:31

## 2021-05-03 RX ADMIN — PIOGLITAZONE 15 MG: 15 TABLET ORAL at 09:25

## 2021-05-03 RX ADMIN — INSULIN LISPRO 1 UNITS: 100 INJECTION, SOLUTION INTRAVENOUS; SUBCUTANEOUS at 22:55

## 2021-05-03 RX ADMIN — DOXYCYCLINE HYCLATE 100 MG: 100 TABLET, COATED ORAL at 22:06

## 2021-05-03 RX ADMIN — CALCIUM CARBONATE-VITAMIN D TAB 500 MG-200 UNIT 1 TABLET: 500-200 TAB at 09:25

## 2021-05-03 RX ADMIN — Medication 2 PUFF: at 08:25

## 2021-05-03 RX ADMIN — HYDROCODONE BITARTRATE AND ACETAMINOPHEN 2 TABLET: 5; 325 TABLET ORAL at 16:14

## 2021-05-03 RX ADMIN — PANTOPRAZOLE SODIUM 40 MG: 40 TABLET, DELAYED RELEASE ORAL at 06:32

## 2021-05-03 RX ADMIN — CALCIUM CARBONATE-VITAMIN D TAB 500 MG-200 UNIT 1 TABLET: 500-200 TAB at 22:06

## 2021-05-03 RX ADMIN — METFORMIN HYDROCHLORIDE 500 MG: 500 TABLET ORAL at 11:22

## 2021-05-03 RX ADMIN — HYDROCHLOROTHIAZIDE 25 MG: 25 TABLET ORAL at 09:25

## 2021-05-03 RX ADMIN — FLUTICASONE PROPIONATE 1 SPRAY: 50 SPRAY, METERED NASAL at 11:22

## 2021-05-03 RX ADMIN — MONTELUKAST SODIUM 10 MG: 10 TABLET, FILM COATED ORAL at 22:06

## 2021-05-03 RX ADMIN — INSULIN LISPRO 2 UNITS: 100 INJECTION, SOLUTION INTRAVENOUS; SUBCUTANEOUS at 09:23

## 2021-05-03 RX ADMIN — Medication 10 ML: at 22:07

## 2021-05-03 RX ADMIN — CEFEPIME HYDROCHLORIDE 1000 MG: 1 INJECTION, POWDER, FOR SOLUTION INTRAMUSCULAR; INTRAVENOUS at 22:08

## 2021-05-03 RX ADMIN — SODIUM CHLORIDE: 9 INJECTION, SOLUTION INTRAVENOUS at 18:58

## 2021-05-03 RX ADMIN — MORPHINE SULFATE 4 MG: 4 INJECTION, SOLUTION INTRAMUSCULAR; INTRAVENOUS at 12:39

## 2021-05-03 RX ADMIN — Medication 2000 UNITS: at 09:25

## 2021-05-03 ASSESSMENT — PAIN DESCRIPTION - ORIENTATION
ORIENTATION: LEFT
ORIENTATION: LEFT
ORIENTATION: RIGHT;LEFT
ORIENTATION: LEFT
ORIENTATION: LEFT

## 2021-05-03 ASSESSMENT — PAIN DESCRIPTION - PAIN TYPE
TYPE: SURGICAL PAIN

## 2021-05-03 ASSESSMENT — PAIN DESCRIPTION - FREQUENCY
FREQUENCY: INTERMITTENT
FREQUENCY: INTERMITTENT

## 2021-05-03 ASSESSMENT — PAIN SCALES - GENERAL
PAINLEVEL_OUTOF10: 6
PAINLEVEL_OUTOF10: 7
PAINLEVEL_OUTOF10: 4
PAINLEVEL_OUTOF10: 7
PAINLEVEL_OUTOF10: 6
PAINLEVEL_OUTOF10: 7
PAINLEVEL_OUTOF10: 8
PAINLEVEL_OUTOF10: 7

## 2021-05-03 ASSESSMENT — PAIN DESCRIPTION - LOCATION
LOCATION: KNEE
LOCATION: KNEE;SHOULDER
LOCATION: KNEE
LOCATION: KNEE

## 2021-05-03 ASSESSMENT — PAIN DESCRIPTION - DESCRIPTORS
DESCRIPTORS: THROBBING

## 2021-05-03 ASSESSMENT — PAIN DESCRIPTION - PROGRESSION: CLINICAL_PROGRESSION: GRADUALLY IMPROVING

## 2021-05-03 ASSESSMENT — PAIN DESCRIPTION - ONSET
ONSET: ON-GOING

## 2021-05-03 NOTE — PROGRESS NOTES
Incentive Spirometry education and demonstration completed by Respiratory Therapy Yes      Response to education: Excellent     Teaching Time: 5 minutes    Minimum Predicted Vital Capacity - 478 mL. Patient's Actual Vital Capacity - 500 mL. Turning over to Nursing for routine follow-up Yes.     Comments: IS goal met IS turned to nursing    Electronically signed by Carla Henning on 5/3/2021 at 4:38 PM

## 2021-05-03 NOTE — PROGRESS NOTES
understanding. Barriers to Learning: none  REQUIRES PT FOLLOW UP: Yes  Activity Tolerance  Activity Tolerance: Patient limited by pain; Patient limited by endurance       Patient Diagnosis(es): The primary encounter diagnosis was Closed fracture of nasal bone, initial encounter. Diagnoses of Closed displaced fracture of left patella, unspecified fracture morphology, initial encounter and Fall from slip, trip, or stumble, initial encounter were also pertinent to this visit. has a past medical history of Arthritis, Diabetes mellitus ([address] HYPERCHOLESTERAEMIA, Hypertension, and TIA (transient ischaemic attack). has a past surgical history that includes colectomy; joint replacement (04/22/2010); Total knee arthroplasty (8/19/2010); and Patella fracture surgery (Left, 5/2/2021). Restrictions  Restrictions/Precautions  Restrictions/Precautions: Fall Risk, Weight Bearing, ROM Restrictions(high fall risk, \"absolutely no knee flexion\" per ortho)  Required Braces or Orthoses?: Yes  Lower Extremity Weight Bearing Restrictions  Left Lower Extremity Weight Bearing: Non Weight Bearing  Required Braces or Orthoses  Left Lower Extremity Brace: Knee Brace  Position Activity Restriction  Other position/activity restrictions: Judge Mack is a 80 y.o. female patient presents emergency department for evaluation of slip and fall. Patient states she was turning on the light in her home when she fell forward. Patient states she hit her face and forehead on the ground. Patient states she fell onto both knees but is having significant pain and swelling to the left knee. Patient states she feels she cannot move it very well. Patient is also having pain to the posterior right shoulder. Patient denies any loss of consciousness. She takes a baby aspirin daily. She states she had a bloody nose for a short period of time however it is not currently bleeding. She denies any current headache.   She states she does have pain to the nasal bridge. S/p left periprosthetic patella open reduction internal fixation (5/2/2021)  Vision/Hearing  Vision: Within Functional Limits  Hearing: Within functional limits     Subjective  General  Chart Reviewed: Yes  Patient assessed for rehabilitation services?: Yes  Family / Caregiver Present: Yes(Patient's daughter in law arrived at end of therapy)  Diagnosis: S/p left periprosthetic patella open reduction internal fixation (5/2/2021)  Follows Commands: Within Functional Limits  General Comment  Comments: Patient found supine in bed. Subjective  Subjective: Patient agreeable to therapy. Patient complains of pain (nursing alerted and pain medication given prior to mobility) and dry throat following surgery.   Pain Screening  Patient Currently in Pain: Yes  Pain Assessment  Pain Level: 7  Pain Type: Surgical pain  Pain Location: Knee  Pain Orientation: Left  Vital Signs  Patient Currently in Pain: Yes       Orientation  Orientation  Overall Orientation Status: Within Functional Limits  Social/Functional History  Social/Functional History  Lives With: Alone  Type of Home: Apartment(Patient lives alone in apartment in [facility] [address]: One level  Home Access: Elevator  Bathroom Shower/Tub: Walk-in shower  Bathroom Toilet: Standard  Bathroom Equipment: Shower chair, Grab bars in shower, Grab bars around toilet  Bathroom Accessibility: Walker accessible  Receives Help From: Family  ADL Assistance: Independent  Homemaking Assistance: Needs assistance  Homemaking Responsibilities: Yes  Ambulation Assistance: Independent  Occupation: Retired  Additional Comments: Pt had fall PTA  Cognition   Cognition  Overall Cognitive Status: WNL    Objective     Observation/Palpation  Posture: Fair    AROM RLE (degrees)  RLE AROM: WFL  AROM LLE (degrees)  LLE AROM : (did not formally assess due to recent surgery and precautions)  Strength RLE  Strength RLE: WFL  Strength LLE  Strength LLE: (not formally assesed

## 2021-05-03 NOTE — PROGRESS NOTES
Occupational Therapy   Occupational Therapy Initial Assessment  Date: 5/3/2021   Patient Name: Carter Garber  MRN: [id number]     : 1935    Date of Service: 5/3/2021    Discharge Recommendations:  Carter Garber scored a  on the AM-PAC ADL Inpatient form. Current research shows that an AM-PAC score of 17 or less is typically not associated with a discharge to the patient's home setting. Based on the patient's AM-PAC score and their current ADL deficits, it is recommended that the patient have 3-5 sessions per week of Occupational Therapy at d/c to increase the patient's independence. Please see assessment section for further patient specific details. If patient discharges prior to next session this note will serve as a discharge summary. Please see below for the latest assessment towards goals. OT Equipment Recommendations  Equipment Needed: No(TBD next level of care)    Assessment   Performance deficits / Impairments: Decreased functional mobility ; Decreased ADL status; Decreased endurance;Decreased balance;Decreased high-level IADLs  Assessment: Pt is below her baseline level of occupational function, based on the above deficits associated with closed L patella fx, s/p ORIF. Pt would benefit from continued skilled acute OT services to address these deficits. Treatment Diagnosis: Decreased ADL/IADL status, functional mobility, endurance, and balance associted with closed L patella fx, s/p ORIF  Prognosis: Good  Decision Making: Low Complexity  History: Pt 79 yo, lives alone in apt w/elevator, I ADLs, gets some assist w/IADLs, good family support, retired.  PMH: DM, HTN, TIA, L & R TKA  Exam: 6 clicks, ADL & functional mobility assessment, 5 performance deficits/impairments, stable presentation  Assistance / Modification: Max A of 2 for bed mobility, D of 3 for transfer, Mod A UB dressing, combing hair  OT Education: OT Role;Plan of Care;Precautions;Transfer Training  Patient Education: Pt verbalized understanding. May need reinforcement d/t language barrier. Barriers to Learning: Slight language. REQUIRES OT FOLLOW UP: Yes  Activity Tolerance  Activity Tolerance: Patient Tolerated treatment well  Safety Devices  Safety Devices in place: Yes  Type of devices: All fall risk precautions in place;Call light within reach;Gait belt;Patient at risk for falls; Left in chair;Nurse notified; Chair alarm in place           Patient Diagnosis(es): The primary encounter diagnosis was Closed fracture of nasal bone, initial encounter. Diagnoses of Closed displaced fracture of left patella, unspecified fracture morphology, initial encounter and Fall from slip, trip, or stumble, initial encounter were also pertinent to this visit. has a past medical history of Arthritis, Diabetes mellitus ([facility] [address] HYPERCHOLESTERAEMIA, Hypertension, and TIA (transient ischaemic attack). has a past surgical history that includes colectomy; joint replacement (04/22/2010); Total knee arthroplasty (8/19/2010); and Patella fracture surgery (Left, 5/2/2021). Treatment Diagnosis: Decreased ADL/IADL status, functional mobility, endurance, and balance associted with closed L patella fx, s/p ORIF      Restrictions  Restrictions/Precautions  Restrictions/Precautions: Fall Risk, Weight Bearing(high fall risk)  Required Braces or Orthoses?: Yes  Lower Extremity Weight Bearing Restrictions  Left Lower Extremity Weight Bearing: Non Weight Bearing  Required Braces or Orthoses  Left Lower Extremity Brace: Knee Brace  Position Activity Restriction  Other position/activity restrictions: Karley Brunner is a 80 y.o. female patient presents emergency department for evaluation of slip and fall. Patient states she was turning on the light in her home when she fell forward. Patient states she hit her face and forehead on the ground. Patient states she fell onto both knees but is having significant pain and swelling to the left knee. Patient states she feels she cannot move it very well. Patient is also having pain to the posterior right shoulder. Patient denies any loss of consciousness. She takes a baby aspirin daily. She states she had a bloody nose for a short period of time however it is not currently bleeding. She denies any current headache. She states she does have pain to the nasal bridge. Subjective   General  Chart Reviewed: Yes  Patient assessed for rehabilitation services?: Yes  Family / Caregiver Present: No  Referring Practitioner: Lana Damian MD, for d/c planning  Diagnosis: Closed fx, L patella, s/p ORIF 5/2  Subjective  Subjective: Pt in semi-johnson's position eating breakfast on arrival. Pt agreeable to OT eval. Pt reports 7/10 pain in L knee. Pt requesting for staff to bathe her and comb her hair.   Patient Currently in Pain: Yes  Pain Assessment  Pain Assessment: 0-10  Pain Level: 7  Pain Type: Surgical pain  Pain Location: Knee  Pain Orientation: Left  Pre Treatment Pain Screening  Intervention List: Patient able to continue with treatment;Nurse called to administer meds  Vital Signs  Patient Currently in Pain: Yes  Social/Functional History  Social/Functional History  Lives With: Alone  Type of Home: Apartment(Patient lives alone in apartment in [facility] 91: One level  Home Access: Elevator  Bathroom Shower/Tub: Walk-in shower  Bathroom Toilet: Standard  Bathroom Equipment: Shower chair, Grab bars in shower, Grab bars around toilet  Bathroom Accessibility: Walker accessible  Receives Help From: Family  ADL Assistance: Independent  Homemaking Assistance: Needs assistance  Homemaking Responsibilities: Yes  Ambulation Assistance: Independent  Occupation: Retired  Additional Comments: Pt had fall PTA       Objective   Vision: Within Functional Limits  Hearing: Within functional limits    Orientation  Overall Orientation Status: Within Functional Limits  Observation/Palpation  Posture: Fair  Balance  Sitting Balance: Stand by assistance  Standing Balance: Dependent/Total(of 3, including 1 to hold up LE)  Standing Balance  Time: ~5 sec  Activity: transfer EOB to recliner  ADL  Feeding: Independent  Grooming: Moderate assistance(comb hair)  UE Bathing: Setup(seated in recliner)  LE Bathing: None  UE Dressing: Moderate assistance(doff undershirt; gown change)  LE Dressing: Maximum assistance(don socks; Supervision doff socks in long sitting)  Toileting: None  Tone RUE  RUE Tone: Normotonic  Tone LUE  LUE Tone: Normotonic  Coordination  Movements Are Fluid And Coordinated: Yes(WFL for combing hair)        Transfers  Stand Pivot Transfers: Dependent/Total(Max A of 2, plus 1 to hold LE)  Sit to stand: Dependent/Total(Max A of 2, plus 1 to hold LE)  Stand to sit: Dependent/Total(Max A of 2, plus 1 to hold LE)  Vision - Basic Assessment  Visual History: No significant visual history  Patient Visual Report: No visual complaint reported.   Cognition  Overall Cognitive Status: WNL  Perception  Overall Perceptual Status: WFL     Sensation  Overall Sensation Status: WFL        LUE AROM (degrees)  LUE AROM : WFL  Left Hand AROM (degrees)  Left Hand AROM: WFL  RUE AROM (degrees)  RUE AROM : Exceptions  RUE General AROM: shoulder flexion ~75-80 degrees while combing hair; limited d/t pain  Right Hand AROM (degrees)  Right Hand AROM: WFL  LUE Strength  Gross LUE Strength: WFL(for bathing tasks)  L Hand General: NT  RUE Strength  Gross RUE Strength: WFL(for bathing tasks)  R Hand General: NT                   Plan   Plan  Times per week: 7  Times per day: Daily  Current Treatment Recommendations: Safety Education & Training, Self-Care / ADL, Endurance Training, Functional Mobility Training, Balance Training, Patient/Caregiver Education & Training      AM-PAC Score        AM-PAC Inpatient Daily Activity Raw Score: 12 (05/03/21 1125)  AM-PAC Inpatient ADL T-Scale Score : 30.6 (05/03/21 1125)  ADL Inpatient CMS 0-100% Score: 66.57 (05/03/21 1125)  ADL Inpatient CMS G-Code Modifier : CL (05/03/21 1125)    Goals  Short term goals  Time Frame for Short term goals: Discharge  Short term goal 1: Mod A for bed mobility  Short term goal 2: Mod A for functional transfers to ADL surfaces maintaining NWB  Short term goal 3: Min A for UB bathing/dressing  Short term goal 4: Min A for LB bathing/dressing w/AE       Therapy Time   Individual Concurrent Group Co-treatment   Time In 0955         Time Out 1048         Minutes 53               Timed Code Treatment Minutes:  38 min    Total Treatment Minutes:  53 min    C/ Michael 66, OT   Gino Rivera., OTR/L, [id number]

## 2021-05-03 NOTE — PROGRESS NOTES
2021: Assessment complete, VSS, pulses intact in TESFAYE LE, pt denies numbness, immobilizer remains in place to LLE, pt still having a lot of R shoulder pain, facial brusing/swelling has decreased compared to this AM, gave morphine for pain, assisted onto the bedpan, denies nausea, discussed care plan, pt mutually agrees, call light and belongings in reach, will continue monitoring. 0030: pt sleeping, no s/s of distress, will continue monitoring. 4794: assisted pt onto the bedpan, tolerated well, gave morphine for pain, see MAR, applied ice pack to R shoulder. 0530: pt O2 decreased, encouraged IS, applied O2 NC 2L, back up to 94%. 0630: pt temp elevated 100.9F, gave Norco, see MAR, also had pt use IS, did not tolerate well.     Garett Fierro

## 2021-05-03 NOTE — OP NOTE
[address]                     [address] [facility], [address] [place] [address]                                OPERATIVE REPORT    PATIENT NAME: Dheeraj Lamar                 :        1935  MED REC NO:   [id number]                          ROOM:       36  ACCOUNT NO:   [de-identified]                           ADMIT DATE: 2021  PROVIDER:     Micaela Santillan MD    DATE OF PROCEDURE:  2021    PREOPERATIVE DIAGNOSIS:  Left periprosthetic patellar fracture,  comminuted. POSTOPERATIVE DIAGNOSIS:  Left periprosthetic patellar fracture,  comminuted. OPERATION PERFORMED:  Open treatment of the left periprosthetic patellar  fracture with internal fixation using nonabsorbable braided sutures. SURGEON:  Micaela Santillan MD    FIRST ASSISTANT:  Lenny Weathers. ANESTHESIA:  General.    TOURNIQUET TIME:  42 minutes at 250 mmHg. COMPLICATIONS:  None. BLOOD LOSS:  Minimal.    DETAILS OF THE PROCEDURE:  The patient was brought back to the OR and  transferred onto the operating room table. General anesthesia was  administered. A bump was placed under her left hip and a nonsterile  thigh tourniquet was applied. The entire left lower extremity was  subsequently prepped and draped in the usual sterile fashion. A full  time-out was performed with all parties in agreement. The patient did  receive Ancef for preoperative IV antibiotics. The left lower extremity  was exsanguinated with an Esmarch and the tourniquet was then inflated. I used the previous midline total knee arthroplasty incision and the  incision was carried down on to the fracture site. Thick medial and  lateral skin flaps were elevated with attention paid to perform  meticulous soft tissue handling throughout the procedure to prevent  postoperative wound complications. There was a comminuted fracture at  the mid to inferior aspect of the patella with tears of the medial and  lateral retinaculum. There was fracture hematoma, which was removed and  the knee was thoroughly irrigated. I made a small medial parapatellar  arthrotomy in order to better visualize the fracture fragments as well  as the patellar button and this demonstrated that the patellar button  was intact and was not loose. The superior fracture fragment contained  the patellar button and it was obliquely oriented superiorly. There was  not a lot of bone stock remaining in order to perform screw fixation. The inferior pole fracture fragments were also comminuted. I did not  like the idea of just using K-wires for fear of wound complications and  subsequent infection, which is a high risk with this injury in the  setting of a prior total knee arthroplasty. Therefore, I elected to  proceed with suture fixation. The knee was thoroughly irrigated at this  time using IrriSept.  Using a drill bit, three drill holes were created  in the comminuted inferior pole patellar fracture fragment and then  using #2 FiberWire sutures, two rows of Krackow stitches were placed  through the comminuted inferior pole patellar pieces and then passed  down through the patellar tendon. Again, two sets of Krackow stitches  were placed using this technique. Next, using Beath pin, I was able to  create three bone tunnels thru the superior pole patellar fragment while  staying away from the mid-patellar button peg. The Beath pin was  then used to thread the #2 FiberWire sutures through the superior pole  of the patella. Splits were made in the distal quadriceps tendon to  retrieve these sutures and then with the knee now held in  hyperextension, the sutures were then tied over the superior pole of the  patella after the patellar fracture fragments were reduced  appropriately. This worked rather well and reapproximated the patellar  fracture fragments very nicely with good bony apposition.   The excess  FiberWire sutures were cut superiorly and the knee was once again  irrigated with IrriSept.  Topical vancomycin powder was then applied  within the knee as well as in the surgical field. The medial and  lateral retinacular tears were now repaired in water-type fashion using  #1 Vicryl sutures. The fascia over the patella itself was also repaired  in the same fashion and the small medial parapatellar split was also  closed accordingly. Prior to closure, the tourniquet was let down and  hemostasis was confirmed. The skin was then closed in a layered fashion  using inverted Vicryls proximally and distally and then 3-0 nylon in a  horizontal mattress configuration. The wound was cleaned with wet and  dries and then dressed in the usual sterile fashion using Xeroform,  sterile gauze, ABD pad, and sterile Webril. A double 6-inch Ace wrap  was applied from the foot all the way up to the thigh and the knee  immobilizer was then replaced. After the drapes were removed, the patient was transferred back on to  the hospital bed where she was extubated and then moved to the PACU in  stable condition.         Laurel Murdock MD    D: 05/02/2021 18:06:42       T: 05/03/2021 0:55:30     [email or web address]  Job#: [id number]     Doc#: [id number]    CC:

## 2021-05-03 NOTE — CARE COORDINATION
Discharge Planning Assessment  Readmission risk score 17%  RN/ discharge planner met with patient/ (and family member) to discuss reason for admission, current living situation, and potential needs at the time of discharge    Demographics/Insurance verified Yes    Current type of dwelling: independent living apartment    Patient from ECF/SW confirmed with: n/a    Living arrangements: alone    Level of function/Support: requires assistance    PCP:JOSUE Bronson    Last Visit to PCP: recent    DME: not stated    Active with any community resources/agencies/skilled home care: not stated    Medication compliance issues:    Financial issues that could impact healthcare: none      Tentative discharge plan: [facility] needs skilled placement, [facility] list provided    *Discussed and provided facilities of choice if transition to a skilled nursing facility is required at the time of discharge      *Discussed with patient and/or family that on the day of discharge home tentative time of discharge will be between 10 AM and noon. Transportation at the time of discharge: CM to facilitate    CM spoke with patient's daughter, she is aware of the SNF list at bedside. Daughter reports that patient is normally oriented and very active at the apartment.      Himanshu Motley, SARAHYN, CCM, RN  [facility]  [phone]

## 2021-05-03 NOTE — CONSULTS
normocephalic; Oral mucosa moist  Neck: Trachea midline, neck supple. No thyromegaly noted. CV: No audible murmurs  Resp: No increased WOB, no audible wheezing   GI: Nondistended   Neuro: Alert, oriented, appropriate. Skin: No visible abnormalities  MSK: No joint abnormalities noted outside of the left lower extremity knee immobilizer   Ext: No significant edema appreciated. No varicosities. Lab Results   Component Value Date    WBC 10.0 05/03/2021    HGB 10.5 (L) 05/03/2021    HCT 31.5 (L) 05/03/2021    MCV 87.7 05/02/2021     05/03/2021     Lab Results   Component Value Date    INR 1.03 04/30/2021    INR 1.43 (H) 08/22/2010    INR 1.93 (H) 08/21/2010    PROTIME 11.9 04/30/2021    PROTIME 15.2 (H) 08/22/2010    PROTIME 20.5 (H) 08/21/2010     Lab Results   Component Value Date    CREATININE 0.6 05/03/2021    BUN 13 05/03/2021     (L) 05/03/2021    K 3.5 05/03/2021    CL 97 (L) 05/03/2021    CO2 26 05/03/2021     Lab Results   Component Value Date    ALT 15 05/01/2021    AST 19 05/01/2021    ALKPHOS 61 05/01/2021    BILITOT 0.3 05/01/2021       Most recent imaging studies revealed   EXAMINATION:   CT OF THE LEFT KNEE WITHOUT CONTRAST 4/30/2021 7:49 pm       TECHNIQUE:   CT of the left knee was performed without the administration of intravenous   contrast.  Multiplanar reformatted images are provided for review.  Dose   modulation, iterative reconstruction, and/or weight based adjustment of the   mA/kV was utilized to reduce the radiation dose to as low as reasonably   achievable.       COMPARISON:   Left knee radiographs 04/30/2021.       HISTORY   ORDERING SYSTEM PROVIDED HISTORY: patella fracture seen on xr, tibial plateu   fracture? pt fell onto knee   TECHNOLOGIST PROVIDED HISTORY:   Reason for exam:->patella fracture seen on xr, tibial plateu fracture? pt   fell onto knee   Decision Support Exception - unselect if not a suspected or confirmed   emergency medical condition->Emergency Medical

## 2021-05-03 NOTE — PROGRESS NOTES
Patient alert and oriented x4, awake in bed. VSS. Blood glucose 171. Shift assessment completed. Patient wants to eat before she takes her medications. Therapy in to work with patient. Will continue to monitor. Patient takes morning medications per [facility]    4:24 PM  Patient with temp of 101. 3. Notified physician. No tylenol ordered, patient complained of left knee pain, gave norco per MAR.

## 2021-05-03 NOTE — PLAN OF CARE
Problem: Falls - Risk of:  Goal: Will remain free from falls  Description: Will remain free from falls  5/3/2021 0709 by Brianne Vasquez RN  Outcome: Ongoing  5/3/2021 0526 by Garett Fierro RN  Outcome: Ongoing     Problem: Falls - Risk of:  Goal: Absence of physical injury  Description: Absence of physical injury  5/3/2021 0709 by Brianne Vasquez RN  Outcome: Ongoing  5/3/2021 0526 by Garett Fierro RN  Outcome: Ongoing     Problem: Pain:  Goal: Pain level will decrease  Description: Pain level will decrease  5/3/2021 0709 by Brianne Vasquez RN  Outcome: Ongoing  5/3/2021 0526 by Garett Fierro RN  Outcome: Ongoing     Problem: Pain:  Goal: Control of acute pain  Description: Control of acute pain  5/3/2021 0709 by Brianne Vasquez RN  Outcome: Ongoing  5/3/2021 0526 by Garett Fierro RN  Outcome: Ongoing

## 2021-05-03 NOTE — DISCHARGE INSTR - COC
Continuity of Care Form    Patient Name: Kris Naik   :  1935  MRN:  [id number]    Admit date:  2021  Discharge date:  2021    Code Status Order: Full Code   Advance Directives:   Advance Care Flowsheet Documentation       Date/Time Healthcare Directive Type of Healthcare Directive Copy in [address] Agent's Name Healthcare Agent's Phone Number    21 1135  No, patient does not have an advance directive for healthcare treatment  --  --  --  --  --    21 0219  No, patient does not have an advance directive for healthcare treatment  --  --  --  --  --            Admitting Physician:  Ledy Byers MD  PCP: Ledy Byers MD    Discharging Nurse: [facility] Unit/Room#: 2RO-0491/9470-85  Discharging Unit Phone Number: [phone]    Emergency Contact:   Extended Emergency Contact Information  Primary Emergency Contact: desiree blankenship  Home Phone: [phone]  Relation: Child  Secondary Emergency Contact: jose juan milian  Mobile Phone: [phone]  Relation: Child    Past Surgical History:  Past Surgical History:   Procedure Laterality Date    COLECTOMY      JOINT REPLACEMENT  2010    left knee    TOTAL KNEE ARTHROPLASTY  2010    right, cemented       Immunization History:   Immunization History   Administered Date(s) Administered    Influenza Virus Vaccine 2019    Influenza Whole 2009    Influenza, MDCK Quadv, with preserv IM (Flucelvax 4 yrs and older) 2017, 10/01/2018, 2020    Influenza, Kevin Majestic, IM, (6 mo and older Fluzone, Flulaval, Fluarix and 3 yrs and older Afluria) 2019    Pneumococcal Conjugate 7-valent (Dhruv Balk) 2008    Pneumococcal Polysaccharide (Erikdxpsd62) 2017    Zoster Live (Zostavax) 2016       Active Problems:  Patient Active Problem List   Diagnosis Code    Osteoarthritis of left knee M17.12    Controlled type 2 diabetes mellitus without complication, without long-term current use of insulin (HCC) E11.9    HTN (hypertension) I10    ASCVD (arteriosclerotic cardiovascular disease) I25.10    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 2XXA    Closed comminuted fracture of left patella, initial encounter S82.042A    Right rotator cuff tear arthropathy M75.101, M12.811    Fall from slip, trip, or stumble, initial encounter W01. 0XXA       Isolation/Infection:   Isolation            No Isolation          Patient Infection Status       Infection Onset Added Last Indicated Last Indicated By Review Planned Expiration Resolved Resolved By    None active    Resolved    COVID-19 Rule Out 05/01/21 05/01/21 05/01/21 COVID-19, Rapid (Ordered)   05/01/21 Rule-Out Test Resulted            Nurse Assessment:  Last Vital Signs: /65   Pulse 90   Temp 98.1 °F (36.7 °C) (Oral)   Resp 16   Ht 4' 10\" (1.473 m)   Wt 113 lb (51.3 kg)   SpO2 95%   BMI 23.62 kg/m²     Last documented pain score (0-10 scale): Pain Level: 7  Last Weight:   Wt Readings from Last 1 Encounters:   05/02/21 113 lb (51.3 kg)     Mental Status:  oriented, alert, coherent and able to concentrate and follow conversation    IV Access:  - None    Nursing Mobility/ADLs:  Walking   Dependent  Transfer  Dependent  Bathing  Assisted  Dressing  Assisted  Toileting  Assisted  Feeding  [address]  Assisted  Med Delivery   whole    Wound Care Documentation and Therapy:        Elimination:  Continence:   · Bowel:  Yes  · Bladder: Yes  Urinary Catheter: None   Colostomy/Ileostomy/Ileal Conduit: No       Date of Last BM: 5/6/2021    Intake/Output Summary (Last 24 hours) at 5/3/2021 1023  Last data filed at 5/3/2021 0408  Gross per 24 hour   Intake --   Output 1350 ml   Net -1350 ml     I/O last 3 completed shifts:  In: -   Out: 8377 [Urine:1300; Blood:50]    Safety Concerns: At Risk for Falls    Impairments/Disabilities:      Language Barrier - Haitian,knows english but thick accent    Nutrition Therapy:  Current Nutrition Therapy:   - Oral Diet:  Carb Control 3 carbs/meal (1500kcals/day)    Routes of Feeding: Oral  Liquids: Thin Liquids  Daily Fluid Restriction: no  Last Modified Barium Swallow with Video (Video Swallowing Test): not done    Treatments at the Time of Hospital Discharge:   Respiratory Treatments:   Oxygen Therapy:  is not on home oxygen therapy. Ventilator:    - No ventilator support    Rehab Therapies: Physical Therapy  Weight Bearing Status/Restrictions: Non-weight bearing on left leg; continue KI  Other Medical Equipment (for information only, NOT a DME order):  walker and knee immobilizer  Other Treatments: NO ROM of the knee. Maintain  Mepilex dressing until follow-up office visit in two weeks. May change if needed for drainage. Prevent constipation while taking narcotics by drinking plenty of water and using stool softener/laxative as needed. Follow up with Dr. Roseann Oliver at 9:15 on Friday 5/14. Call [phone] if needing to reschedule.      DVT Prophylaxis:  ASA 81 mg twice daily x 30 days     Patient's personal belongings (please select all that are sent with patient):  Glasses, Dentures upper and lower    RN SIGNATURE:  Electronically signed by Choco Gonzáles RN on 5/6/21 at 2:46 PM EDT    CASE MANAGEMENT/SOCIAL WORK SECTION    Inpatient Status Date: 4/30/2021    Readmission Risk Assessment Score:  Readmission Risk              Risk of Unplanned Readmission:        17           Discharging to Facility/ Agency   · [place] [facility]  · PGAGWKP:[facility], [address]  · Phone: [phone]  · Fax: [phone]    / signature: Electronically signed by Ethan Christian

## 2021-05-03 NOTE — PROGRESS NOTES
[facility] Orthopedic Surgery   Progress Note    CHIEF COMPLAINT/DIAGNOSIS: S/p left periprosthetic patella open reduction internal fixation (5/2/2021)    SUBJECTIVE: Patient is sitting up in bed about to work with therapy. She reports ongoing knee pain despite Norco and morphine. She denies numbness or tingling in her legs. She reports ambulating at baseline without assistive devices. She resides in an independent senior living facility and is on a second story but they do have an elevator. She denies issues overnight. OBJECTIVE  Physical    VITALS:  /65   Pulse 90   Temp 98.1 °F (36.7 °C) (Oral)   Resp 16   Ht 4' 10\" (1.473 m)   Wt 113 lb (51.3 kg)   SpO2 95%   BMI 23.62 kg/m²     GENERAL: Alert and oriented x3, in no acute distress. MUSCULOSKELETAL: Able to dorsi and plantarflex the ankle without issue. INCISION:  Covered with post-op dressing/ACE, which is clean dry and intact. ROM: Continue mobilizing  Sensory:  Intact to light touch in peroneal and tibial distributions. Vascular:   1+ DP pulses with brisk cap refill;  calf soft and nontender    Data    ALL MEDICATIONS HAVE BEEN REVIEWED    CBC:   Recent Labs     05/01/21  0502 05/02/21  0603 05/03/21  0520   WBC 11.0 7.8 10.0   HGB 11.6* 11.4* 10.5*   HCT 35.4* 34.5* 31.5*    206 201     BMP:   Recent Labs     05/01/21  0502 05/02/21  0603 05/03/21  0520   * 132* 132*   K 3.9 4.0 3.5   CL 96* 93* 97*   CO2 27 31 26   BUN 19 19 13   CREATININE 0.6 0.6 0.6     INR:   Recent Labs     04/30/21  2240   INR 1.03       ASSESSMENT:  S/p LEFT periprosthetic patellar ORIF(5/2/21), POD#1  DM2  HTN  HLD  CAD    PLAN:   - WB status:  NWB; continue Knee Immobilizer; reviewed post op precautions  - DVT prophylaxis: Lovenox as inpt. Rec d/c with ASA 81mg BID x 30 days  - PT/OT  - Pain Control: Increased norco 1-2 q4 prn. Due to orthopaedic surgical procedure/condition, patient may require pain medication for up to 6-8 weeks.   - Expected post op acute blood loss anemia: H/H today: 10.5/31.5  - ID: Ancef x2 doses postoperative completed. 10 days doxycycline started today  - Dispo: await therapy recs. Follow-up with Dr. Jose Garcia in 2 weeks.   [address], WILY-CNP  5/3/2021  10:21 AM

## 2021-05-04 LAB
ANION GAP SERPL CALCULATED.3IONS-SCNC: 7 MMOL/L (ref 3–16)
BUN BLDV-MCNC: 10 MG/DL (ref 7–20)
CALCIUM SERPL-MCNC: 8.1 MG/DL (ref 8.3–10.6)
CHLORIDE BLD-SCNC: 100 MMOL/L (ref 99–110)
CO2: 28 MMOL/L (ref 21–32)
CREAT SERPL-MCNC: <0.5 MG/DL (ref 0.6–1.2)
ESTIMATED AVERAGE GLUCOSE: 148.5 MG/DL
GFR AFRICAN AMERICAN: >60
GFR NON-AFRICAN AMERICAN: >60
GLUCOSE BLD-MCNC: 111 MG/DL (ref 70–99)
GLUCOSE BLD-MCNC: 120 MG/DL (ref 70–99)
GLUCOSE BLD-MCNC: 138 MG/DL (ref 70–99)
GLUCOSE BLD-MCNC: 166 MG/DL (ref 70–99)
GLUCOSE BLD-MCNC: 167 MG/DL (ref 70–99)
HBA1C MFR BLD: 6.8 %
HCT VFR BLD CALC: 32 % (ref 36–48)
HEMOGLOBIN: 10.7 G/DL (ref 12–16)
LV EF: 63 %
LVEF MODALITY: NORMAL
MCH RBC QN AUTO: 29.8 PG (ref 26–34)
MCHC RBC AUTO-ENTMCNC: 33.3 G/DL (ref 31–36)
MCV RBC AUTO: 89.4 FL (ref 80–100)
PDW BLD-RTO: 14.2 % (ref 12.4–15.4)
PERFORMED ON: ABNORMAL
PLATELET # BLD: 197 K/UL (ref 135–450)
PMV BLD AUTO: 8 FL (ref 5–10.5)
POTASSIUM SERPL-SCNC: 3.5 MMOL/L (ref 3.5–5.1)
RBC # BLD: 3.59 M/UL (ref 4–5.2)
SODIUM BLD-SCNC: 135 MMOL/L (ref 136–145)
WBC # BLD: 8.5 K/UL (ref 4–11)

## 2021-05-04 PROCEDURE — 2700000000 HC OXYGEN THERAPY PER DAY

## 2021-05-04 PROCEDURE — 6370000000 HC RX 637 (ALT 250 FOR IP): Performed by: NURSE PRACTITIONER

## 2021-05-04 PROCEDURE — 36415 COLL VENOUS BLD VENIPUNCTURE: CPT

## 2021-05-04 PROCEDURE — 80048 BASIC METABOLIC PNL TOTAL CA: CPT

## 2021-05-04 PROCEDURE — APPNB45 APP NON BILLABLE 31-45 MINUTES: Performed by: NURSE PRACTITIONER

## 2021-05-04 PROCEDURE — 94760 N-INVAS EAR/PLS OXIMETRY 1: CPT

## 2021-05-04 PROCEDURE — 97535 SELF CARE MNGMENT TRAINING: CPT

## 2021-05-04 PROCEDURE — 94640 AIRWAY INHALATION TREATMENT: CPT

## 2021-05-04 PROCEDURE — 6360000002 HC RX W HCPCS: Performed by: ORTHOPAEDIC SURGERY

## 2021-05-04 PROCEDURE — 6370000000 HC RX 637 (ALT 250 FOR IP): Performed by: ORTHOPAEDIC SURGERY

## 2021-05-04 PROCEDURE — 99024 POSTOP FOLLOW-UP VISIT: CPT | Performed by: NURSE PRACTITIONER

## 2021-05-04 PROCEDURE — 93306 TTE W/DOPPLER COMPLETE: CPT

## 2021-05-04 PROCEDURE — 1200000000 HC SEMI PRIVATE

## 2021-05-04 PROCEDURE — 85027 COMPLETE CBC AUTOMATED: CPT

## 2021-05-04 PROCEDURE — 6360000002 HC RX W HCPCS: Performed by: INTERNAL MEDICINE

## 2021-05-04 PROCEDURE — 2580000003 HC RX 258: Performed by: ORTHOPAEDIC SURGERY

## 2021-05-04 PROCEDURE — 97530 THERAPEUTIC ACTIVITIES: CPT

## 2021-05-04 PROCEDURE — 94761 N-INVAS EAR/PLS OXIMETRY MLT: CPT

## 2021-05-04 PROCEDURE — 2580000003 HC RX 258: Performed by: INTERNAL MEDICINE

## 2021-05-04 RX ADMIN — PANTOPRAZOLE SODIUM 40 MG: 40 TABLET, DELAYED RELEASE ORAL at 10:51

## 2021-05-04 RX ADMIN — PIOGLITAZONE 15 MG: 15 TABLET ORAL at 10:51

## 2021-05-04 RX ADMIN — AMLODIPINE BESYLATE 10 MG: 5 TABLET ORAL at 10:51

## 2021-05-04 RX ADMIN — Medication 2000 UNITS: at 10:50

## 2021-05-04 RX ADMIN — DOXYCYCLINE HYCLATE 100 MG: 100 TABLET, COATED ORAL at 20:18

## 2021-05-04 RX ADMIN — CEFEPIME HYDROCHLORIDE 1000 MG: 1 INJECTION, POWDER, FOR SOLUTION INTRAMUSCULAR; INTRAVENOUS at 20:20

## 2021-05-04 RX ADMIN — Medication 2 PUFF: at 21:32

## 2021-05-04 RX ADMIN — ATORVASTATIN CALCIUM 10 MG: 10 TABLET, FILM COATED ORAL at 20:18

## 2021-05-04 RX ADMIN — DOXEPIN HYDROCHLORIDE 50 MG: 25 CAPSULE ORAL at 20:17

## 2021-05-04 RX ADMIN — CEFEPIME HYDROCHLORIDE 1000 MG: 1 INJECTION, POWDER, FOR SOLUTION INTRAMUSCULAR; INTRAVENOUS at 11:40

## 2021-05-04 RX ADMIN — HYDROCHLOROTHIAZIDE 25 MG: 25 TABLET ORAL at 10:51

## 2021-05-04 RX ADMIN — CALCIUM CARBONATE-VITAMIN D TAB 500 MG-200 UNIT 1 TABLET: 500-200 TAB at 10:51

## 2021-05-04 RX ADMIN — MONTELUKAST SODIUM 10 MG: 10 TABLET, FILM COATED ORAL at 20:17

## 2021-05-04 RX ADMIN — ENOXAPARIN SODIUM 30 MG: 30 INJECTION SUBCUTANEOUS at 10:52

## 2021-05-04 RX ADMIN — Medication 2 PUFF: at 11:58

## 2021-05-04 RX ADMIN — FLUTICASONE PROPIONATE 1 SPRAY: 50 SPRAY, METERED NASAL at 10:52

## 2021-05-04 RX ADMIN — LOSARTAN POTASSIUM 100 MG: 100 TABLET, FILM COATED ORAL at 10:51

## 2021-05-04 RX ADMIN — SODIUM CHLORIDE: 9 INJECTION, SOLUTION INTRAVENOUS at 11:37

## 2021-05-04 RX ADMIN — CALCIUM CARBONATE-VITAMIN D TAB 500 MG-200 UNIT 1 TABLET: 500-200 TAB at 20:18

## 2021-05-04 RX ADMIN — INSULIN LISPRO 1 UNITS: 100 INJECTION, SOLUTION INTRAVENOUS; SUBCUTANEOUS at 20:27

## 2021-05-04 RX ADMIN — DOXYCYCLINE HYCLATE 100 MG: 100 TABLET, COATED ORAL at 10:50

## 2021-05-04 RX ADMIN — HYDROCODONE BITARTRATE AND ACETAMINOPHEN 2 TABLET: 5; 325 TABLET ORAL at 19:29

## 2021-05-04 RX ADMIN — INSULIN LISPRO 2 UNITS: 100 INJECTION, SOLUTION INTRAVENOUS; SUBCUTANEOUS at 16:47

## 2021-05-04 RX ADMIN — MORPHINE SULFATE 4 MG: 4 INJECTION, SOLUTION INTRAMUSCULAR; INTRAVENOUS at 15:35

## 2021-05-04 RX ADMIN — METFORMIN HYDROCHLORIDE 500 MG: 500 TABLET ORAL at 10:51

## 2021-05-04 ASSESSMENT — PAIN DESCRIPTION - ORIENTATION
ORIENTATION: LEFT
ORIENTATION: LEFT

## 2021-05-04 ASSESSMENT — PAIN SCALES - GENERAL: PAINLEVEL_OUTOF10: 7

## 2021-05-04 NOTE — PROGRESS NOTES
Patient Diagnosis(es): The primary encounter diagnosis was Closed fracture of nasal bone, initial encounter. Diagnoses of Closed displaced fracture of left patella, unspecified fracture morphology, initial encounter and Fall from slip, trip, or stumble, initial encounter were also pertinent to this visit. has a past medical history of Arthritis, Diabetes mellitus ([facility] [address] HYPERCHOLESTERAEMIA, Hypertension, and TIA (transient ischaemic attack). has a past surgical history that includes colectomy; joint replacement (04/22/2010); Total knee arthroplasty (8/19/2010); and Patella fracture surgery (Left, 5/2/2021). Restrictions  Restrictions/Precautions  Restrictions/Precautions: Fall Risk, Weight Bearing, ROM Restrictions(high fall risk, \"absolutely no knee flexion\" per ortho)  Required Braces or Orthoses?: Yes  Lower Extremity Weight Bearing Restrictions  Left Lower Extremity Weight Bearing: Non Weight Bearing  Required Braces or Orthoses  Left Lower Extremity Brace: Knee Brace  Position Activity Restriction  Other position/activity restrictions: Bobby Davis is a 80 y.o. female patient presents emergency department for evaluation of slip and fall. Patient states she was turning on the light in her home when she fell forward. Patient states she hit her face and forehead on the ground. Patient states she fell onto both knees but is having significant pain and swelling to the left knee. Patient states she feels she cannot move it very well. Patient is also having pain to the posterior right shoulder. Patient denies any loss of consciousness. She takes a baby aspirin daily. She states she had a bloody nose for a short period of time however it is not currently bleeding. She denies any current headache. She states she does have pain to the nasal bridge.  S/p left periprosthetic patella open reduction internal fixation (5/2/2021)  Subjective   General  Chart Reviewed: Yes  Family / Caregiver Present: Yes  Subjective  Subjective: Patient agreeable to therapy. Requested to use restroom. Pt reports 8/10 L knee pain  General Comment  Comments: Patient found supine in bed. Orientation  Orientation  Overall Orientation Status: Within Functional Limits  Cognition      Objective   Bed mobility  Supine to Sit: Maximum assistance  Scooting: Maximal assistance  Transfers  Stand Pivot Transfers: Dependent/Total(Max A of 2)  Comment: 2 stand pivots from EOB to commode to recliner; maintained NWBing status with Max A  Ambulation  Ambulation?: No  WB Status: NWB     Balance  Posture: Fair  Sitting - Static: Fair  Sitting - Dynamic: Fair  Standing - Static: Poor  Standing - Dynamic: Poor  Comments: sat SBA on commode dewayne-care           AM-PAC Score  AM-PAC Inpatient Mobility Raw Score : 8 (05/04/21 1516)  AM-PAC Inpatient T-Scale Score : 28.52 (05/04/21 1516)  Mobility Inpatient CMS 0-100% Score: 86.62 (05/04/21 1516)  Mobility Inpatient CMS G-Code Modifier : CM (05/04/21 1516)          Goals  Short term goals  Time Frame for Short term goals: By discharge  Short term goal 1: Patient will perform bed mobility with min assist of 1. Short term goal 2: Patient will perform sit to stand with min assist of 1. Short term goal 3: Patient will perform bed to chair transfer with min assist of 1  No goals met this treatment.     Plan    Plan  Times per week: 7  Times per day: Daily  Current Treatment Recommendations: Functional Mobility Training, Equipment Evaluation, Education, & procurement, Safety Education & Training, Transfer Training, ROM, Strengthening, Balance Training, ADL/Self-care Training, Endurance Training, Pain Management  Safety Devices  Type of devices: Chair alarm in place, Nurse notified, Call light within reach, Left in chair, All fall risk precautions in place     Therapy Time   Individual Concurrent Group Co-treatment   Time In       1327   Time Out       1356   Minutes       29   Timed Code Treatment Minutes: [address] [place], [address]    I agree with the above note. Goals addressed by PT.   Thanks, Matthias Buerger, [facility], DPT [id number]

## 2021-05-04 NOTE — PLAN OF CARE
Problem: Falls - Risk of:  Goal: Will remain free from falls  Description: Will remain free from falls  5/4/2021 1020 by Dejah Rudolph RN  Outcome: Ongoing  5/4/2021 0909 by Gavino Vegas RN  Outcome: Ongoing  Goal: Absence of physical injury  Description: Absence of physical injury  5/4/2021 1020 by Dejah Rudolph RN  Outcome: Ongoing  5/4/2021 0909 by Gavino Vegas RN  Outcome: Ongoing     Problem: Pain:  Goal: Pain level will decrease  Description: Pain level will decrease  5/4/2021 1020 by Dejah Rudolph RN  Outcome: Ongoing  5/4/2021 0909 by Gavino Vegas RN  Outcome: Ongoing  Goal: Control of acute pain  Description: Control of acute pain  5/4/2021 1020 by Dejah Rudolph RN  Outcome: Ongoing  5/4/2021 0909 by Gavino Vegas RN  Outcome: Ongoing

## 2021-05-04 NOTE — PROGRESS NOTES
[address] [facility] Orthopedic Surgery   Progress Note    CHIEF COMPLAINT/DIAGNOSIS: S/p left periprosthetic patella open reduction internal fixation (5/2/2021)    SUBJECTIVE: The patient is sitting up in bed. Reports moderate knee pain - but reports the norco is working well. Denies new issues overnight. Passing gas - limited appetite. JC this AM.  Blood cultures pending - Afebrile since yesterday evening. Updated her son Jan Harley by phone. OBJECTIVE  Physical    VITALS:  BP (!) 156/77   Pulse 99   Temp 98.2 °F (36.8 °C) (Oral)   Resp 16   Ht 4' 10\" (1.473 m)   Wt 113 lb (51.3 kg)   SpO2 94%   BMI 23.62 kg/m²     GENERAL: Alert and oriented x3, in no acute distress. MUSCULOSKELETAL: Able to dorsi and plantarflex the ankle without issue. INCISION:  Covered with post-op dressing/ACE, removed - no active drainage- sutures intact, skin surrounding incision cleansed ith chlorhexidine. Xeroform, and silver impregnated waterproof dressing placed followed by ACE and KI.  ROM: Continue immobilizer  Sensory:  Intact to light touch in peroneal and tibial distributions. Vascular:   1+ DP pulses with brisk cap refill;  calf soft and nontender    Data    ALL MEDICATIONS HAVE BEEN REVIEWED    CBC:   Recent Labs     05/02/21  0603 05/03/21  0520 05/04/21  0723   WBC 7.8 10.0 8.5   HGB 11.4* 10.5* 10.7*   HCT 34.5* 31.5* 32.0*    201 197     BMP:   Recent Labs     05/02/21  0603 05/03/21  0520 05/04/21  0723   * 132* 135*   K 4.0 3.5 3.5   CL 93* 97* 100   CO2 31 26 28   BUN 19 13 10   CREATININE 0.6 0.6 <0.5*     INR:   No results for input(s): INR in the last 72 hours. ASSESSMENT:  S/p LEFT periprosthetic patellar ORIF (5/2/21), POD#2  DM II  HTN  HLD  CAD    PLAN:   - WB status:  NWB; continue Knee Immobilizer; reviewed post op precautions  - DVT prophylaxis: Lovenox as inpt. Rec d/c with ASA 81mg BID x 30 days  - PT/OT  - Pain Control: Continued norco - has not required morphine since yesterday. Due to orthopaedic surgical procedure/condition, patient may require pain medication for up to 6-8 weeks. - Expected post op acute blood loss anemia: H/H today: 10.7/32  - ID: Ancef x2 doses postoperative completed. 10 days doxycycline started 5/3  - Dispo: SNF when medically ready    Follow-up with Dr. Susan Daugherty in 2 weeks.   [address] Samano-CNP  5/4/2021  10:58 AM

## 2021-05-04 NOTE — PROGRESS NOTES
Department of Internal Medicine  General Internal Medicine   Progress Note      SUBJECTIVE: fever up to 101  Moderate post op pain , needs up to 1-2 L oxygen , feels \"congested \" in lung     General ROS: positive for  - fatigue and malaise fever but no chills   negative for - night sweats or weight loss  Psychological ROS: negative  Ophthalmic ROS: negative  Respiratory ROS: positive for - cough and shortness of breath  negative for - hemoptysis or wheezing  Cardiovascular ROS: positive for - dyspnea on exertion  negative for - chest pain  Gastrointestinal ROS: no abdominal pain, change in bowel habits, or black or bloody stools  Genito-Urinary ROS: no dysuria, trouble voiding, or hematuria  Musculoskeletal ROS: left knee pain   Neurological ROS: no TIA or stroke symptoms  Dermatological ROS: negative    OBJECTIVE      Medications      Current Facility-Administered Medications: glucose (GLUTOSE) 40 % oral gel 15 g, 15 g, Oral, PRN  dextrose 50 % IV solution, 12.5 g, Intravenous, PRN  glucagon (rDNA) injection 1 mg, 1 mg, Intramuscular, PRN  dextrose 5 % solution, 100 mL/hr, Intravenous, PRN  insulin lispro (1 Unit Dial) 0-12 Units, 0-12 Units, Subcutaneous, TID WC  insulin lispro (1 Unit Dial) 0-6 Units, 0-6 Units, Subcutaneous, Nightly  ipratropium-albuterol (DUONEB) nebulizer solution 1 ampule, 1 ampule, Inhalation, Q4H PRN  HYDROcodone-acetaminophen (NORCO) 5-325 MG per tablet 1 tablet, 1 tablet, Oral, Q4H PRN **OR** HYDROcodone-acetaminophen (NORCO) 5-325 MG per tablet 2 tablet, 2 tablet, Oral, Q4H PRN  acetaminophen (TYLENOL) tablet 325 mg, 325 mg, Oral, Q4H PRN  cefepime (MAXIPIME) 1000 mg IVPB minibag, 1,000 mg, Intravenous, Q12H  Efferdent Denture Cleanser TBEF, , ,   enoxaparin (LOVENOX) injection 30 mg, 30 mg, Subcutaneous, Daily  doxycycline hyclate (VIBRA-TABS) tablet 100 mg, 100 mg, Oral, 2 times per day  benzocaine-menthol (CEPACOL SORE THROAT) lozenge 1 lozenge, 1 lozenge, Oral, Q2H PRN  perflutren lipid microspheres (DEFINITY) injection 1.65 mg, 1.5 mL, Intravenous, ONCE PRN  pioglitazone (ACTOS) tablet 15 mg, 15 mg, Oral, Daily **AND** metFORMIN (GLUCOPHAGE) tablet 500 mg, 500 mg, Oral, Daily with breakfast  losartan (COZAAR) tablet 100 mg, 100 mg, Oral, Daily  pantoprazole (PROTONIX) tablet 40 mg, 40 mg, Oral, QAM AC  amLODIPine (NORVASC) tablet 10 mg, 10 mg, Oral, Daily  hydroCHLOROthiazide (HYDRODIURIL) tablet 25 mg, 25 mg, Oral, Daily  budesonide-formoterol (SYMBICORT) 160-4.5 MCG/ACT inhaler 2 puff, 2 puff, Inhalation, BID  calcium-vitamin D (OSCAL-500) 500-200 MG-UNIT per tablet 1 tablet, 1 tablet, Oral, BID  morphine (PF) injection 0.5 mg, 0.5 mg, Intravenous, Q4H PRN  sodium chloride (OCEAN, BABY AYR) 0.65 % nasal spray 1 spray, 1 spray, Each Nostril, Q4H PRN  calcium carbonate (TUMS) chewable tablet 500 mg, 500 mg, Oral, TID PRN  Vitamin D (CHOLECALCIFEROL) tablet 2,000 Units, 2,000 Units, Oral, Daily  montelukast (SINGULAIR) tablet 10 mg, 10 mg, Oral, Nightly  atorvastatin (LIPITOR) tablet 10 mg, 10 mg, Oral, Nightly  albuterol sulfate  (90 Base) MCG/ACT inhaler 2 puff, 2 puff, Inhalation, 4x Daily PRN  doxepin (SINEQUAN) capsule 50 mg, 50 mg, Oral, Nightly  benzonatate (TESSALON) capsule 200 mg, 200 mg, Oral, BID PRN  fluticasone (FLONASE) 50 MCG/ACT nasal spray 1 spray, 1 spray, Each Nostril, Daily  0.9 % sodium chloride infusion, , Intravenous, Continuous  sodium chloride flush 0.9 % injection 5-40 mL, 5-40 mL, Intravenous, 2 times per day  sodium chloride flush 0.9 % injection 10 mL, 10 mL, Intravenous, PRN  0.9 % sodium chloride infusion, 25 mL, Intravenous, PRN  [DISCONTINUED] morphine (PF) injection 2 mg, 2 mg, Intravenous, Q2H PRN **OR** morphine injection 4 mg, 4 mg, Intravenous, Q2H PRN  promethazine (PHENERGAN) tablet 12.5 mg, 12.5 mg, Oral, Q6H PRN **OR** ondansetron (ZOFRAN) injection 4 mg, 4 mg, Intravenous, Q6H PRN  magnesium hydroxide (MILK OF MAGNESIA) 400 MG/5ML suspension 30 mL, 30 mL, Oral, Daily PRN  potassium chloride (KLOR-CON M) extended release tablet 40 mEq, 40 mEq, Oral, PRN **OR** potassium bicarb-citric acid (EFFER-K) effervescent tablet 40 mEq, 40 mEq, Oral, PRN **OR** potassium chloride 10 mEq/100 mL IVPB (Peripheral Line), 10 mEq, Intravenous, PRN    Physical      Vitals: /72   Pulse 92   Temp 98.1 °F (36.7 °C) (Oral)   Resp 17   Ht 4' 10\" (1.473 m)   Wt 113 lb (51.3 kg)   SpO2 96%   BMI 23.62 kg/m²   Temp: Temp: 98.1 °F (36.7 °C)  Max: Temp  Av.1 °F (37.3 °C)  Min: 98.1 °F (36.7 °C)  Max: 101.3 °F (38.5 °C)  Respiration range:  Resp  Av.7  Min: 16  Max: 18  Pulse Range:  Pulse  Av  Min: 90  Max: 93  Blood pressure range:  Systolic (04ALO), ERUM:747 , Min:117 , YYU:869   , Diastolic (08KDF), XXB:52, Min:56, Max:83    SpO2  Av.7 %  Min: 94 %  Max: 97 %    Intake/Output Summary (Last 24 hours) at 2021 0805  Last data filed at 2021 0732  Gross per 24 hour   Intake 3055.11 ml   Output 650 ml   Net 2405. 11 ml       Vent settings:  Pulse  Av  Min: 68  Max: 112  Resp  Avg: 10.9  Min: 1  Max: 31  SpO2  Av.7 %  Min: 70 %  Max: 100 %    CONSTITUTIONAL:  fatigued, alert, cooperative, mild distress, appears stated age and thin  EYES:  Unremarkable   NECK:  Mild JVD  and supple, symmetrical, trachea midline  BACK:  symmetric and no curvature  LUNGS:  Few basilar crackles no wheezes   CARDIOVASCULAR:  normal apical pulses, regular rate and rhythm, normal S1 and S2 and no S3  ABDOMEN:  Soft BS + non tender   MUSCULOSKELETAL:  Left knee post op site under  Immobilizer , no distal N/V deficit   NEUROLOGIC:  Essentially intact     SKIN:  Warm and moist  and no bruising or bleeding    Data      Recent Results (from the past 96 hour(s))   CBC Auto Differential    Collection Time: 21 10:40 PM   Result Value Ref Range    WBC 9.5 4.0 - 11.0 K/uL    RBC 4.23 4.00 - 5.20 M/uL    Hemoglobin 12.4 12.0 - 16.0 g/dL    Hematocrit 37.6 36.0 - 48.0 %    MCV 88.8 80.0 - 100.0 fL    MCH 29.2 26.0 - 34.0 pg    MCHC 32.9 31.0 - 36.0 g/dL    RDW 14.6 12.4 - 15.4 %    Platelets 494 355 - 829 K/uL    MPV 8.6 5.0 - 10.5 fL    Neutrophils % 75.0 %    Lymphocytes % 14.3 %    Monocytes % 8.9 %    Eosinophils % 1.6 %    Basophils % 0.2 %    Neutrophils Absolute 7.2 1.7 - 7.7 K/uL    Lymphocytes Absolute 1.4 1.0 - 5.1 K/uL    Monocytes Absolute 0.8 0.0 - 1.3 K/uL    Eosinophils Absolute 0.2 0.0 - 0.6 K/uL    Basophils Absolute 0.0 0.0 - 0.2 K/uL   Comprehensive Metabolic Panel w/ Reflex to MG    Collection Time: 04/30/21 10:40 PM   Result Value Ref Range    Sodium 130 (L) 136 - 145 mmol/L    Potassium reflex Magnesium 3.6 3.5 - 5.1 mmol/L    Chloride 92 (L) 99 - 110 mmol/L    CO2 26 21 - 32 mmol/L    Anion Gap 12 3 - 16    Glucose 162 (H) 70 - 99 mg/dL    BUN 16 7 - 20 mg/dL    CREATININE 0.6 0.6 - 1.2 mg/dL    GFR Non-African American >60 >60    GFR African American >60 >60    Calcium 9.3 8.3 - 10.6 mg/dL    Total Protein 7.1 6.4 - 8.2 g/dL    Albumin 4.3 3.4 - 5.0 g/dL    Albumin/Globulin Ratio 1.5 1.1 - 2.2    Total Bilirubin <0.2 0.0 - 1.0 mg/dL    Alkaline Phosphatase 65 40 - 129 U/L    ALT 15 10 - 40 U/L    AST 21 15 - 37 U/L    Globulin 2.8 g/dL   Protime-INR    Collection Time: 04/30/21 10:40 PM   Result Value Ref Range    Protime 11.9 10.0 - 13.2 sec    INR 1.03 0.86 - 1.14   POCT Glucose    Collection Time: 04/30/21 11:48 PM   Result Value Ref Range    POC Glucose 181 (H) 70 - 99 mg/dl    Performed on ACCU-CHEK    CBC auto differential    Collection Time: 05/01/21  5:02 AM   Result Value Ref Range    WBC 11.0 4.0 - 11.0 K/uL    RBC 3.98 (L) 4.00 - 5.20 M/uL    Hemoglobin 11.6 (L) 12.0 - 16.0 g/dL    Hematocrit 35.4 (L) 36.0 - 48.0 %    MCV 89.0 80.0 - 100.0 fL    MCH 29.0 26.0 - 34.0 pg    MCHC 32.6 31.0 - 36.0 g/dL    RDW 14.4 12.4 - 15.4 %    Platelets 271 935 - 924 K/uL    MPV 8.0 5.0 - 10.5 fL    Neutrophils % 79.8 %    Lymphocytes % 10.8 %    Monocytes % 7.9 %    Eosinophils % 1.1 %    Basophils % 0.4 %    Neutrophils Absolute 8.8 (H) 1.7 - 7.7 K/uL    Lymphocytes Absolute 1.2 1.0 - 5.1 K/uL    Monocytes Absolute 0.9 0.0 - 1.3 K/uL    Eosinophils Absolute 0.1 0.0 - 0.6 K/uL    Basophils Absolute 0.0 0.0 - 0.2 K/uL   Comprehensive Metabolic Panel w/ Reflex to MG    Collection Time: 05/01/21  5:02 AM   Result Value Ref Range    Sodium 134 (L) 136 - 145 mmol/L    Potassium reflex Magnesium 3.9 3.5 - 5.1 mmol/L    Chloride 96 (L) 99 - 110 mmol/L    CO2 27 21 - 32 mmol/L    Anion Gap 11 3 - 16    Glucose 138 (H) 70 - 99 mg/dL    BUN 19 7 - 20 mg/dL    CREATININE 0.6 0.6 - 1.2 mg/dL    GFR Non-African American >60 >60    GFR African American >60 >60    Calcium 9.4 8.3 - 10.6 mg/dL    Total Protein 6.5 6.4 - 8.2 g/dL    Albumin 4.0 3.4 - 5.0 g/dL    Albumin/Globulin Ratio 1.6 1.1 - 2.2    Total Bilirubin 0.3 0.0 - 1.0 mg/dL    Alkaline Phosphatase 61 40 - 129 U/L    ALT 15 10 - 40 U/L    AST 19 15 - 37 U/L    Globulin 2.5 g/dL   POCT Glucose    Collection Time: 05/01/21  8:34 AM   Result Value Ref Range    POC Glucose 119 (H) 70 - 99 mg/dl    Performed on ACCU-CHEK    POCT Glucose    Collection Time: 05/01/21  4:58 PM   Result Value Ref Range    POC Glucose 184 (H) 70 - 99 mg/dl    Performed on ACCU-CHEK    COVID-19, Rapid    Collection Time: 05/01/21  5:45 PM   Result Value Ref Range    SARS-CoV-2, NAAT Not Detected Not Detected   POCT Glucose    Collection Time: 05/01/21  9:25 PM   Result Value Ref Range    POC Glucose 191 (H) 70 - 99 mg/dl    Performed on ACCU-CHEK    Basic Metabolic Panel    Collection Time: 05/02/21  6:03 AM   Result Value Ref Range    Sodium 132 (L) 136 - 145 mmol/L    Potassium 4.0 3.5 - 5.1 mmol/L    Chloride 93 (L) 99 - 110 mmol/L    CO2 31 21 - 32 mmol/L    Anion Gap 8 3 - 16    Glucose 130 (H) 70 - 99 mg/dL    BUN 19 7 - 20 mg/dL    CREATININE 0.6 0.6 - 1.2 mg/dL    GFR Non-African American >60 >60    GFR African American >60 >60    Calcium 8.6 8.3 - 10.6 mg/dL   CBC Auto Differential    Collection Time: 05/02/21  6:03 AM   Result Value Ref Range    WBC 7.8 4.0 - 11.0 K/uL    RBC 3.93 (L) 4.00 - 5.20 M/uL    Hemoglobin 11.4 (L) 12.0 - 16.0 g/dL    Hematocrit 34.5 (L) 36.0 - 48.0 %    MCV 87.7 80.0 - 100.0 fL    MCH 28.9 26.0 - 34.0 pg    MCHC 33.0 31.0 - 36.0 g/dL    RDW 14.1 12.4 - 15.4 %    Platelets 984 839 - 920 K/uL    MPV 8.3 5.0 - 10.5 fL    Neutrophils % 63.8 %    Lymphocytes % 22.0 %    Monocytes % 12.0 %    Eosinophils % 1.9 %    Basophils % 0.3 %    Neutrophils Absolute 5.0 1.7 - 7.7 K/uL    Lymphocytes Absolute 1.7 1.0 - 5.1 K/uL    Monocytes Absolute 0.9 0.0 - 1.3 K/uL    Eosinophils Absolute 0.2 0.0 - 0.6 K/uL    Basophils Absolute 0.0 0.0 - 0.2 K/uL   APTT    Collection Time: 05/02/21  6:03 AM   Result Value Ref Range    aPTT 33.1 24.2 - 36.2 sec   POCT Glucose    Collection Time: 05/02/21  8:06 AM   Result Value Ref Range    POC Glucose 117 (H) 70 - 99 mg/dl    Performed on ACCU-CHEK    POCT Glucose    Collection Time: 05/02/21 11:25 AM   Result Value Ref Range    POC Glucose 97 70 - 99 mg/dl    Performed on ACCU-CHEK    POCT Glucose    Collection Time: 05/02/21  3:28 PM   Result Value Ref Range    POC Glucose 136 (H) 70 - 99 mg/dl    Performed on ACCU-CHEK    POCT Glucose    Collection Time: 05/02/21  4:53 PM   Result Value Ref Range    POC Glucose 134 (H) 70 - 99 mg/dl    Performed on ACCU-CHEK    POCT Glucose    Collection Time: 05/02/21  8:03 PM   Result Value Ref Range    POC Glucose 224 (H) 70 - 99 mg/dl    Performed on ACCU-CHEK    Hemoglobin and hematocrit, blood    Collection Time: 05/03/21  5:20 AM   Result Value Ref Range    Hemoglobin 10.5 (L) 12.0 - 16.0 g/dL    Hematocrit 31.5 (L) 36.0 - 48.0 %   Basic Metabolic Panel    Collection Time: 05/03/21  5:20 AM   Result Value Ref Range    Sodium 132 (L) 136 - 145 mmol/L    Potassium 3.5 3.5 - 5.1 mmol/L    Chloride 97 (L) 99 - 110 mmol/L    CO2 26 21 - 32 mmol/L    Anion Gap 9 3 - 16    Glucose 215 (H) 70 - 99 mg/dL    BUN 13 7 - 20 mg/dL    CREATININE 0.6 0.6 - 1.2 mg/dL    GFR Non-African American >60 >60    GFR African American >60 >60    Calcium 8.0 (L) 8.3 - 10.6 mg/dL   WBC    Collection Time: 05/03/21  5:20 AM   Result Value Ref Range    WBC 10.0 4.0 - 11.0 K/uL   Platelet count    Collection Time: 05/03/21  5:20 AM   Result Value Ref Range    Platelets 048 609 - 263 K/uL   POCT Glucose    Collection Time: 05/03/21  7:06 AM   Result Value Ref Range    POC Glucose 171 (H) 70 - 99 mg/dl    Performed on ACCU-CHEK    POCT Glucose    Collection Time: 05/03/21 10:58 AM   Result Value Ref Range    POC Glucose 163 (H) 70 - 99 mg/dl    Performed on ACCU-CHEK    POCT Glucose    Collection Time: 05/03/21  3:57 PM   Result Value Ref Range    POC Glucose 132 (H) 70 - 99 mg/dl    Performed on ACCU-CHEK    POCT Glucose    Collection Time: 05/03/21  8:44 PM   Result Value Ref Range    POC Glucose 168 (H) 70 - 99 mg/dl    Performed on ACCU-CHEK    POCT Glucose    Collection Time: 05/04/21  7:14 AM   Result Value Ref Range    POC Glucose 120 (H) 70 - 99 mg/dl    Performed on ACCU-CHEK    CBC    Collection Time: 05/04/21  7:23 AM   Result Value Ref Range    WBC 8.5 4.0 - 11.0 K/uL    RBC 3.59 (L) 4.00 - 5.20 M/uL    Hemoglobin 10.7 (L) 12.0 - 16.0 g/dL    Hematocrit 32.0 (L) 36.0 - 48.0 %    MCV 89.4 80.0 - 100.0 fL    MCH 29.8 26.0 - 34.0 pg    MCHC 33.3 31.0 - 36.0 g/dL    RDW 14.2 12.4 - 15.4 %    Platelets 932 842 - 172 K/uL    MPV 8.0 5.0 - 10.5 fL   Basic Metabolic Panel    Collection Time: 05/04/21  7:23 AM   Result Value Ref Range    Sodium 135 (L) 136 - 145 mmol/L    Potassium 3.5 3.5 - 5.1 mmol/L    Chloride 100 99 - 110 mmol/L    CO2 28 21 - 32 mmol/L    Anion Gap 7 3 - 16    Glucose 138 (H) 70 - 99 mg/dL    BUN 10 7 - 20 mg/dL    CREATININE <0.5 (L) 0.6 - 1.2 mg/dL    GFR Non-African American >60 >60    GFR African American >60 >60    Calcium 8.1 (L) 8.3 - 10.6 mg/dL       ASSESSMENT AND PLAN     Principal Problem:    Closed comminuted fracture of left patella, initial encounter  Active Problems:    Controlled type 2 diabetes mellitus without complication, without long-term current use of insulin (HCC)    HTN (hypertension)    Hyperlipidemia    History of total knee arthroplasty, left    Nasal fracture    Right rotator cuff tear arthropathy    Fall from slip, trip, or stumble, initial encounter  Resolved Problems:    * No resolved hospital problems.  *    Incentive spirometry    CXR    Blood Culture    start IV Cefepime till culture reports are back   Atelectasis can cause noninfectious fever too  PT OT   ARU consult advises against placement  Pending TTE  , since there was suggestion of  Pulmonary HTN on recent  CT Chest

## 2021-05-04 NOTE — CARE COORDINATION
ADRYAN alert by Andee Saint, Admission Coordinator for [facility]. Facility will accept patient at discharge. Patient will need a negative COVID-19 Lab before admitting. SW will continue to follow progress and update patient's discharge plan as needed.     Electronically signed by ANGELES Alarcon on 5/4/2021 at 12:27 PM

## 2021-05-04 NOTE — PROGRESS NOTES
Discussed with family and their preference is [facility] . Discussed with   And  Ivis Womack .  Once O2 requirement weaned and blood culture negative and patient afebril for 24-36  Hours  When ok with Ortho she can be dismissed

## 2021-05-04 NOTE — PROGRESS NOTES
reduction internal fixation (5/2/2021)  Subjective   General  Chart Reviewed: Yes  Patient assessed for rehabilitation services?: Yes  Response to previous treatment: Patient with no complaints from previous session  Family / Caregiver Present: No  Referring Practitioner: Jihan Kinney MD, for d/c planning  Diagnosis: Closed fx, L patella, s/p ORIF 5/2  Subjective  Subjective: Pt supine in bed upon entry, agreeable to OT/PT co-treatment with encouragement, pt requesting to toilet. Pt reporting 8/10 pain in L knee, RN aware and administered pain medications accordingly. Orientation  Orientation  Overall Orientation Status: Within Functional Limits  Objective    ADL  LE Dressing: Dependent/Total(doff/edy underwear for toileting tasks)  Toileting: Dependent/Total  Additional Comments: Pt transferred EOB > BSC, following voiding bm pt requiring total A for rear hygiene/clothing management. Pt declined further ADL completion        Balance  Sitting Balance: Stand by assistance  Standing Balance: Dependent/Total  Standing Balance  Time: ~1 minute  Activity: functional transfers, stance for ADL completion  Comment: total A to maintain L LE NWBing status  Toilet Transfers  Toilet - Technique: Stand pivot  Equipment Used: Standard bedside commode  Toilet Transfer: 2 Person assistance  Toilet Transfers Comments: Max A of 2  Bed mobility  Supine to Sit: Maximum assistance  Scooting: Maximal assistance  Transfers  Stand Pivot Transfers: 2 Person assistance;Dependent/Total(EOB > BSC, BSC > recliner chair--max A of 2)  Sit to stand: 2 Person assistance  Stand to sit: 2 Person assistance     Cognition  Overall Cognitive Status: Exceptions  Arousal/Alertness: Appropriate responses to stimuli  Following Commands:  Follows one step commands with repetition  Attention Span: Attends with cues to redirect  Memory: Decreased short term memory  Safety Judgement: Decreased awareness of need for safety;Decreased awareness of need for assistance  Insights: Decreased awareness of deficits  Initiation: Requires cues for some  Sequencing: Requires cues for some     Perception  Overall Perceptual Status: [address]  Times per week: 7  Times per day: Daily  Current Treatment Recommendations: Safety Education & Training, Self-Care / ADL, Endurance Training, Functional Mobility Training, Balance Training, Patient/Caregiver Education & Training    AM-PAC Score        AM-PAC Inpatient Daily Activity Raw Score: 13 (05/04/21 1513)  AM-PAC Inpatient ADL T-Scale Score : 32.03 (05/04/21 1513)  ADL Inpatient CMS 0-100% Score: 63.03 (05/04/21 1513)  ADL Inpatient CMS G-Code Modifier : CL (05/04/21 1513)    Goals  Short term goals  Time Frame for Short term goals: Discharge  Short term goal 1: Mod A for bed mobility-Max A 5/4  Short term goal 2: Mod A for functional transfers to ADL surfaces maintaining NWB-Max A of 2 5/4  Short term goal 3: Min A for UB bathing/dressing-ongoing 5/4  Short term goal 4: Min A for LB bathing/dressing w/AE-dependent 5/4       Therapy Time   Individual Concurrent Group Co-treatment   Time In       1327   Time Out       1356   Minutes       29      Timed Code Treatment Minutes:  29 Minutes  Total Treatment Minutes:  [address]

## 2021-05-04 NOTE — PROGRESS NOTES
2000: Assessment complete, VSS, pulses intact in TESFAYE LE, immobilizer in place to LLE, pt state 2 [place] is controlling pain well, pt still requiring 1L O2, pt demonstrated use of IS, doing better, assisted pt to get washed up with bath wipes, turning well in bed, discussed care plan, pt mutually agrees, call light and belongings in reach, will continue monitoring. 2200: gave [place] for L knee pain, ice applied, opened immobliizer for 15min per order, will continue monitoring. 0200: pt sleeping no s/s of distress, still req 1L O2 while sleeping.     0630: pt denture brushed, set up to go to [facility] this AM.  [address] [facility]

## 2021-05-04 NOTE — PLAN OF CARE
Problem: Falls - Risk of:  Goal: Will remain free from falls  Description: Will remain free from falls  5/4/2021 1956 by Renée Najera RN  Outcome: Ongoing  5/4/2021 1020 by Aaliyah Higgins RN  Outcome: Ongoing  5/4/2021 0909 by Renaldo Roca RN  Outcome: Ongoing  Goal: Absence of physical injury  Description: Absence of physical injury  5/4/2021 1956 by Renée Najera RN  Outcome: Ongoing  5/4/2021 1020 by Aaliyah Higgins RN  Outcome: Ongoing  5/4/2021 0909 by Renaldo Roca RN  Outcome: Ongoing     Problem: Pain:  Goal: Pain level will decrease  Description: Pain level will decrease  5/4/2021 1956 by Renée Najera RN  Outcome: Ongoing  5/4/2021 1020 by Aaliyah Higgins RN  Outcome: Ongoing  5/4/2021 0909 by Renaldo Roca RN  Outcome: Ongoing  Goal: Control of acute pain  Description: Control of acute pain  5/4/2021 1956 by Renée Najera RN  Outcome: Ongoing  5/4/2021 1020 by Aaliyah Higgins RN  Outcome: Ongoing  5/4/2021 0909 by Renaldo Roca RN  Outcome: Ongoing

## 2021-05-05 LAB
ANION GAP SERPL CALCULATED.3IONS-SCNC: 8 MMOL/L (ref 3–16)
BUN BLDV-MCNC: 10 MG/DL (ref 7–20)
CALCIUM SERPL-MCNC: 8.4 MG/DL (ref 8.3–10.6)
CHLORIDE BLD-SCNC: 95 MMOL/L (ref 99–110)
CO2: 30 MMOL/L (ref 21–32)
CREAT SERPL-MCNC: 0.5 MG/DL (ref 0.6–1.2)
GFR AFRICAN AMERICAN: >60
GFR NON-AFRICAN AMERICAN: >60
GLUCOSE BLD-MCNC: 115 MG/DL (ref 70–99)
GLUCOSE BLD-MCNC: 135 MG/DL (ref 70–99)
GLUCOSE BLD-MCNC: 176 MG/DL (ref 70–99)
GLUCOSE BLD-MCNC: 205 MG/DL (ref 70–99)
GLUCOSE BLD-MCNC: 245 MG/DL (ref 70–99)
HCT VFR BLD CALC: 30.8 % (ref 36–48)
HEMOGLOBIN: 10.6 G/DL (ref 12–16)
MCH RBC QN AUTO: 30.3 PG (ref 26–34)
MCHC RBC AUTO-ENTMCNC: 34.3 G/DL (ref 31–36)
MCV RBC AUTO: 88.2 FL (ref 80–100)
PDW BLD-RTO: 14.1 % (ref 12.4–15.4)
PERFORMED ON: ABNORMAL
PLATELET # BLD: 210 K/UL (ref 135–450)
PMV BLD AUTO: 7.7 FL (ref 5–10.5)
POTASSIUM SERPL-SCNC: 3.1 MMOL/L (ref 3.5–5.1)
RBC # BLD: 3.49 M/UL (ref 4–5.2)
SODIUM BLD-SCNC: 133 MMOL/L (ref 136–145)
WBC # BLD: 6.7 K/UL (ref 4–11)

## 2021-05-05 PROCEDURE — 1200000000 HC SEMI PRIVATE

## 2021-05-05 PROCEDURE — 6370000000 HC RX 637 (ALT 250 FOR IP): Performed by: ORTHOPAEDIC SURGERY

## 2021-05-05 PROCEDURE — 6370000000 HC RX 637 (ALT 250 FOR IP): Performed by: INTERNAL MEDICINE

## 2021-05-05 PROCEDURE — 2580000003 HC RX 258: Performed by: ORTHOPAEDIC SURGERY

## 2021-05-05 PROCEDURE — 97530 THERAPEUTIC ACTIVITIES: CPT

## 2021-05-05 PROCEDURE — 94761 N-INVAS EAR/PLS OXIMETRY MLT: CPT

## 2021-05-05 PROCEDURE — 2580000003 HC RX 258: Performed by: INTERNAL MEDICINE

## 2021-05-05 PROCEDURE — 36415 COLL VENOUS BLD VENIPUNCTURE: CPT

## 2021-05-05 PROCEDURE — 6360000002 HC RX W HCPCS: Performed by: INTERNAL MEDICINE

## 2021-05-05 PROCEDURE — APPNB45 APP NON BILLABLE 31-45 MINUTES: Performed by: NURSE PRACTITIONER

## 2021-05-05 PROCEDURE — 94640 AIRWAY INHALATION TREATMENT: CPT

## 2021-05-05 PROCEDURE — 2700000000 HC OXYGEN THERAPY PER DAY

## 2021-05-05 PROCEDURE — 6370000000 HC RX 637 (ALT 250 FOR IP): Performed by: NURSE PRACTITIONER

## 2021-05-05 PROCEDURE — 85027 COMPLETE CBC AUTOMATED: CPT

## 2021-05-05 PROCEDURE — 80048 BASIC METABOLIC PNL TOTAL CA: CPT

## 2021-05-05 PROCEDURE — 99024 POSTOP FOLLOW-UP VISIT: CPT | Performed by: NURSE PRACTITIONER

## 2021-05-05 PROCEDURE — 6360000002 HC RX W HCPCS: Performed by: ORTHOPAEDIC SURGERY

## 2021-05-05 RX ORDER — INSULIN LISPRO 100 [IU]/ML
0-12 INJECTION, SOLUTION INTRAVENOUS; SUBCUTANEOUS
Status: DISCONTINUED | OUTPATIENT
Start: 2021-05-05 | End: 2021-05-05 | Stop reason: SDUPTHER

## 2021-05-05 RX ORDER — POTASSIUM BICARBONATE 25 MEQ/1
25 TABLET, EFFERVESCENT ORAL 2 TIMES DAILY
Qty: 60 TABLET | Refills: 3 | Status: SHIPPED | OUTPATIENT
Start: 2021-05-05 | End: 2021-09-28

## 2021-05-05 RX ORDER — DEXTROSE MONOHYDRATE 25 G/50ML
12.5 INJECTION, SOLUTION INTRAVENOUS PRN
Status: DISCONTINUED | OUTPATIENT
Start: 2021-05-05 | End: 2021-05-05 | Stop reason: SDUPTHER

## 2021-05-05 RX ORDER — CARBOXYMETHYLCELLULOSE SODIUM 10 MG/ML
1 GEL OPHTHALMIC NIGHTLY
Status: DISCONTINUED | OUTPATIENT
Start: 2021-05-05 | End: 2021-05-06 | Stop reason: HOSPADM

## 2021-05-05 RX ORDER — KETOTIFEN FUMARATE 0.35 MG/ML
1 SOLUTION/ DROPS OPHTHALMIC NIGHTLY
Status: DISCONTINUED | OUTPATIENT
Start: 2021-05-05 | End: 2021-05-06 | Stop reason: HOSPADM

## 2021-05-05 RX ORDER — NICOTINE POLACRILEX 4 MG
15 LOZENGE BUCCAL PRN
Qty: 45 G | Refills: 1 | Status: SHIPPED | OUTPATIENT
Start: 2021-05-05

## 2021-05-05 RX ORDER — NICOTINE POLACRILEX 4 MG
15 LOZENGE BUCCAL PRN
Status: DISCONTINUED | OUTPATIENT
Start: 2021-05-05 | End: 2021-05-05 | Stop reason: SDUPTHER

## 2021-05-05 RX ORDER — POTASSIUM BICARBONATE 25 MEQ/1
25 TABLET, EFFERVESCENT ORAL 2 TIMES DAILY
Status: DISCONTINUED | OUTPATIENT
Start: 2021-05-05 | End: 2021-05-06 | Stop reason: HOSPADM

## 2021-05-05 RX ORDER — DEXTROSE MONOHYDRATE 50 MG/ML
100 INJECTION, SOLUTION INTRAVENOUS PRN
Status: DISCONTINUED | OUTPATIENT
Start: 2021-05-05 | End: 2021-05-05 | Stop reason: SDUPTHER

## 2021-05-05 RX ORDER — ANTIOX #8/OM3/DHA/EPA/LUT/ZEAX 250-2.5 MG
1 CAPSULE ORAL 2 TIMES DAILY
Status: DISCONTINUED | OUTPATIENT
Start: 2021-05-05 | End: 2021-05-06 | Stop reason: HOSPADM

## 2021-05-05 RX ORDER — LOPERAMIDE HYDROCHLORIDE 2 MG/1
2 CAPSULE ORAL 4 TIMES DAILY PRN
Status: DISCONTINUED | OUTPATIENT
Start: 2021-05-05 | End: 2021-05-06 | Stop reason: HOSPADM

## 2021-05-05 RX ORDER — CEFUROXIME AXETIL 250 MG/1
500 TABLET ORAL EVERY 12 HOURS SCHEDULED
Status: DISCONTINUED | OUTPATIENT
Start: 2021-05-05 | End: 2021-05-06 | Stop reason: HOSPADM

## 2021-05-05 RX ORDER — INSULIN LISPRO 100 [IU]/ML
0-6 INJECTION, SOLUTION INTRAVENOUS; SUBCUTANEOUS NIGHTLY
Status: DISCONTINUED | OUTPATIENT
Start: 2021-05-05 | End: 2021-05-05 | Stop reason: SDUPTHER

## 2021-05-05 RX ORDER — CEFUROXIME AXETIL 500 MG/1
500 TABLET ORAL EVERY 12 HOURS SCHEDULED
Qty: 14 TABLET | Refills: 0 | Status: SHIPPED | OUTPATIENT
Start: 2021-05-05 | End: 2021-05-12

## 2021-05-05 RX ORDER — CALCIUM CARBONATE 200(500)MG
500 TABLET,CHEWABLE ORAL 3 TIMES DAILY PRN
COMMUNITY
Start: 2021-05-05 | End: 2021-06-04

## 2021-05-05 RX ORDER — DOXYCYCLINE HYCLATE 100 MG
100 TABLET ORAL EVERY 12 HOURS SCHEDULED
Qty: 20 TABLET | Refills: 0 | Status: SHIPPED | OUTPATIENT
Start: 2021-05-05 | End: 2021-05-15

## 2021-05-05 RX ADMIN — Medication 10 ML: at 09:43

## 2021-05-05 RX ADMIN — HYDROCHLOROTHIAZIDE 25 MG: 25 TABLET ORAL at 09:41

## 2021-05-05 RX ADMIN — POTASSIUM BICARBONATE 25 MEQ: 978 TABLET, EFFERVESCENT ORAL at 21:32

## 2021-05-05 RX ADMIN — CEFUROXIME AXETIL 500 MG: 250 TABLET ORAL at 12:12

## 2021-05-05 RX ADMIN — CARBOXYMETHYLCELLULOSE SODIUM 1 DROP: 10 GEL OPHTHALMIC at 21:27

## 2021-05-05 RX ADMIN — FLUTICASONE PROPIONATE 1 SPRAY: 50 SPRAY, METERED NASAL at 09:46

## 2021-05-05 RX ADMIN — Medication 2 PUFF: at 22:10

## 2021-05-05 RX ADMIN — Medication 10 ML: at 21:28

## 2021-05-05 RX ADMIN — INSULIN LISPRO 4 UNITS: 100 INJECTION, SOLUTION INTRAVENOUS; SUBCUTANEOUS at 12:09

## 2021-05-05 RX ADMIN — ATORVASTATIN CALCIUM 10 MG: 10 TABLET, FILM COATED ORAL at 21:27

## 2021-05-05 RX ADMIN — Medication 1 CAPSULE: at 22:35

## 2021-05-05 RX ADMIN — POTASSIUM BICARBONATE 40 MEQ: 782 TABLET, EFFERVESCENT ORAL at 15:57

## 2021-05-05 RX ADMIN — DOXYCYCLINE HYCLATE 100 MG: 100 TABLET, COATED ORAL at 09:41

## 2021-05-05 RX ADMIN — PANTOPRAZOLE SODIUM 40 MG: 40 TABLET, DELAYED RELEASE ORAL at 05:45

## 2021-05-05 RX ADMIN — Medication 2000 UNITS: at 09:42

## 2021-05-05 RX ADMIN — CALCIUM CARBONATE-VITAMIN D TAB 500 MG-200 UNIT 1 TABLET: 500-200 TAB at 09:42

## 2021-05-05 RX ADMIN — DOXYCYCLINE HYCLATE 100 MG: 100 TABLET, COATED ORAL at 21:25

## 2021-05-05 RX ADMIN — CALCIUM CARBONATE-VITAMIN D TAB 500 MG-200 UNIT 1 TABLET: 500-200 TAB at 21:25

## 2021-05-05 RX ADMIN — MONTELUKAST SODIUM 10 MG: 10 TABLET, FILM COATED ORAL at 21:24

## 2021-05-05 RX ADMIN — INSULIN LISPRO 1 UNITS: 100 INJECTION, SOLUTION INTRAVENOUS; SUBCUTANEOUS at 21:22

## 2021-05-05 RX ADMIN — CEFEPIME HYDROCHLORIDE 1000 MG: 1 INJECTION, POWDER, FOR SOLUTION INTRAMUSCULAR; INTRAVENOUS at 09:58

## 2021-05-05 RX ADMIN — HYDROCODONE BITARTRATE AND ACETAMINOPHEN 2 TABLET: 5; 325 TABLET ORAL at 21:32

## 2021-05-05 RX ADMIN — ENOXAPARIN SODIUM 30 MG: 30 INJECTION SUBCUTANEOUS at 09:42

## 2021-05-05 RX ADMIN — HYDROCODONE BITARTRATE AND ACETAMINOPHEN 2 TABLET: 5; 325 TABLET ORAL at 09:45

## 2021-05-05 RX ADMIN — PIOGLITAZONE 15 MG: 15 TABLET ORAL at 09:42

## 2021-05-05 RX ADMIN — METFORMIN HYDROCHLORIDE 500 MG: 500 TABLET ORAL at 09:42

## 2021-05-05 RX ADMIN — Medication 2 PUFF: at 07:54

## 2021-05-05 RX ADMIN — DOXEPIN HYDROCHLORIDE 50 MG: 25 CAPSULE ORAL at 21:24

## 2021-05-05 RX ADMIN — CEFUROXIME AXETIL 500 MG: 250 TABLET ORAL at 21:24

## 2021-05-05 RX ADMIN — KETOTIFEN FUMARATE 1 DROP: 0.35 SOLUTION/ DROPS OPHTHALMIC at 21:27

## 2021-05-05 ASSESSMENT — PAIN DESCRIPTION - LOCATION
LOCATION: KNEE

## 2021-05-05 ASSESSMENT — PAIN DESCRIPTION - ONSET
ONSET: ON-GOING
ONSET: ON-GOING

## 2021-05-05 ASSESSMENT — PAIN DESCRIPTION - DESCRIPTORS
DESCRIPTORS: OTHER (COMMENT)
DESCRIPTORS: THROBBING
DESCRIPTORS: OTHER (COMMENT)
DESCRIPTORS: THROBBING

## 2021-05-05 ASSESSMENT — PAIN DESCRIPTION - PAIN TYPE
TYPE: SURGICAL PAIN

## 2021-05-05 ASSESSMENT — PAIN DESCRIPTION - ORIENTATION
ORIENTATION: LEFT

## 2021-05-05 ASSESSMENT — PAIN DESCRIPTION - PROGRESSION
CLINICAL_PROGRESSION: GRADUALLY IMPROVING
CLINICAL_PROGRESSION: GRADUALLY WORSENING
CLINICAL_PROGRESSION: GRADUALLY IMPROVING

## 2021-05-05 ASSESSMENT — PAIN DESCRIPTION - FREQUENCY: FREQUENCY: INTERMITTENT

## 2021-05-05 ASSESSMENT — PAIN SCALES - GENERAL
PAINLEVEL_OUTOF10: 6
PAINLEVEL_OUTOF10: 7
PAINLEVEL_OUTOF10: 5

## 2021-05-05 NOTE — PROGRESS NOTES
Expected post op acute blood loss anemia: H/H today: 10.7/32  - ID: Ancef x2 doses postoperative completed. 10 days doxycycline started 5/3  - Dispo: SNF when medically ready    Follow-up with Dr. Isabela Sigala in 2 weeks.   [phone]    Miryam Zacarias APRN-CNP  5/5/2021  1:49 PM

## 2021-05-05 NOTE — PROGRESS NOTES
Occupational Therapy  Facility/Department: [address] ORTHO/NEURO NURSING  Daily Treatment Note    NAME: Daiana White  : 1935  MRN: [id number]    Date of Service: 2021    Discharge Recommendations:  Daiana White scored a 13/24 on the AM-PAC ADL Inpatient form. Current research shows that an AM-PAC score of 17 or less is typically not associated with a discharge to the patient's home setting. Based on the patient's AM-PAC score and their current ADL deficits, it is recommended that the patient have 3-5 sessions per week of Occupational Therapy at d/c to increase the patient's independence. Please see assessment section for further patient specific details. If patient discharges prior to next session this note will serve as a discharge summary. Please see below for the latest assessment towards goals. OT Equipment Recommendations  Equipment Needed: No(TBD next level of care)    Assessment   Performance deficits / Impairments: Decreased functional mobility ; Decreased ADL status; Decreased endurance;Decreased balance;Decreased high-level IADLs  Assessment: Pt is making progress towards her goals. She is able to perform static standing at the walker and maintain NWB status during static standing & a SPT without AD. She does require Max A of 1 for a SPT at this time. Pt is below her baseline level of occupational function, based on the above deficits associated with closed L patella fx, s/p ORIF. Pt would benefit from continued skilled acute OT services to address these deficits. Treatment Diagnosis: Decreased ADL/IADL status, functional mobility, endurance, and balance associted with closed L patella fx, s/p ORIF  Prognosis: Good  OT Education: OT Role;Plan of Care;Precautions;Transfer Training;Orientation; ADL Adaptive Strategies; Equipment  Patient Education: Pt verbalized understanding however would benefit from continued reinforcement for carryover  Barriers to Learning: Slight She states she had a bloody nose for a short period of time however it is not currently bleeding. She denies any current headache. She states she does have pain to the nasal bridge. S/p left periprosthetic patella open reduction internal fixation (5/2/2021)  Subjective   General  Chart Reviewed: Yes  Patient assessed for rehabilitation services?: Yes  Response to previous treatment: Patient with no complaints from previous session  Family / Caregiver Present: No  Referring Practitioner: Ann Pandey MD, for d/c planning  Diagnosis: Closed fx, L patella, s/p ORIF 5/2  Subjective  Subjective: Pt supine in bed upon entry, pleasant and agreeable to OT/PT co-treatment. Pt denies pain at rest.  Pre Treatment Pain Screening  Intervention List: Patient able to continue with treatment  Vital Signs  Patient Currently in Pain: Denies   Orientation  Orientation  Overall Orientation Status: Within Functional Limits  Objective    ADL  Additional Comments: Pt already bathed w/assist of PCA. Declined brushing teeth, as uses denture . Balance  Sitting Balance: Stand by assistance  Standing Balance: Maximum assistance(transfer EOB to recliner w/out AD;  Min A to CGA static standing @ RW)  Standing Balance  Time: 1.5 min, 1 min, 3 min, ~5 sec  Activity: static standing @ EOB x 3 trials, SPT EOB to recliner  Comment: Pt able to maintain NWB status L LE  Bed mobility  Supine to Sit: Moderate assistance(for LEs & trunk; cues for bedrail use; HOB elevated)  Scooting: Stand by assistance(to scoot back on EOB several times)  Transfers  Stand Pivot Transfers: Maximum assistance(of 1 EOB to recliner; pt had difficult time pivoting; no AD)  Sit to stand: Dependent/Total(Mod A of 1 & Min A of 1 for LE 1st trial, Min A of 1 & CGA of 1 for LE 2nd trial; Min A of 1 3rd trial)  Stand to sit: Dependent/Total(3 X onto EOB: Min A of 1 & Min A of 1 for LE 1st trial; Min A of 1 & CGA of 1 for LE 2nd trial; Min A of 1 3rd trial; Max A of 1 to recliner)  Transfer Comments: verbal cues for hand placement                       Cognition  Overall Cognitive Status: WFL  Arousal/Alertness: Appropriate responses to stimuli  Following Commands:  Follows one step commands consistently  Attention Span: Appears intact  Memory: Appears intact  Safety Judgement: Good awareness of safety precautions  Insights: Fully aware of deficits  Initiation: Does not require cues  Sequencing: Requires cues for some     Perception  Overall Perceptual Status: [facility]                                   Plan   Plan  Times per week: 7  Times per day: Daily  Current Treatment Recommendations: Safety Education & Training, Self-Care / ADL, Endurance Training, Functional Mobility Training, Balance Training, Patient/Caregiver Education & Training    AM-PAC Score        AM-PAC Inpatient Daily Activity Raw Score: 13 (05/05/21 1203)  AM-PAC Inpatient ADL T-Scale Score : 32.03 (05/05/21 1203)  ADL Inpatient CMS 0-100% Score: 63.03 (05/05/21 1203)  ADL Inpatient CMS G-Code Modifier : CL (05/05/21 1203)    Goals  Short term goals  Time Frame for Short term goals: Discharge  Short term goal 1: Mod A for bed mobility-Mod A supine to sit 5/5; ongoing for sit to supine  Short term goal 2: Mod A for functional transfers to ADL surfaces maintaining NWB-Max A SPT EOB to recliner 5/5  Short term goal 3: Min A for UB bathing/dressing-ongoing 5/4--Not addressed 5/5  Short term goal 4: Min A for LB bathing/dressing w/AE-dependent 5/4--Not addressed 5/5       Therapy Time   Individual Concurrent Group Co-treatment   Time In       1050   Time Out       1120   Minutes       30         Timed Code Treatment Minutes:  30 Minutes    Total Treatment Minutes: 30 min     C/ [address], OT   Dorrene Coop, Brigitte Guit., OTR/L, [id number]

## 2021-05-05 NOTE — PLAN OF CARE
Problem: Falls - Risk of:  Goal: Will remain free from falls  Description: Will remain free from falls  Outcome: Ongoing     Problem: Falls - Risk of:  Goal: Absence of physical injury  Description: Absence of physical injury  Outcome: Ongoing     Problem: Pain:  Goal: Pain level will decrease  Description: Pain level will decrease  Outcome: Ongoing     Problem: Pain:  Goal: Control of acute pain  Description: Control of acute pain  Outcome: Ongoing

## 2021-05-05 NOTE — PROGRESS NOTES
Tolerance  Activity Tolerance: Patient Tolerated treatment well     Patient Diagnosis(es): The primary encounter diagnosis was Closed fracture of nasal bone, initial encounter. Diagnoses of Closed displaced fracture of left patella, unspecified fracture morphology, initial encounter and Fall from slip, trip, or stumble, initial encounter were also pertinent to this visit. has a past medical history of Arthritis, Diabetes mellitus ([address] HYPERCHOLESTERAEMIA, Hypertension, and TIA (transient ischaemic attack). has a past surgical history that includes colectomy; joint replacement (04/22/2010); Total knee arthroplasty (8/19/2010); and Patella fracture surgery (Left, 5/2/2021). Restrictions  Restrictions/Precautions  Restrictions/Precautions: Fall Risk, Weight Bearing, ROM Restrictions(high fall risk, \"absolutely no knee flexion\" per ortho)  Required Braces or Orthoses?: Yes  Lower Extremity Weight Bearing Restrictions  Left Lower Extremity Weight Bearing: Non Weight Bearing  Required Braces or Orthoses  Left Lower Extremity Brace: Knee Immobilizer  Position Activity Restriction  Other position/activity restrictions: Brandyn Anthony is a 80 y.o. female patient presents emergency department for evaluation of slip and fall. Patient states she was turning on the light in her home when she fell forward. Patient states she hit her face and forehead on the ground. Patient states she fell onto both knees but is having significant pain and swelling to the left knee. Patient states she feels she cannot move it very well. Patient is also having pain to the posterior right shoulder. Patient denies any loss of consciousness. She takes a baby aspirin daily. She states she had a bloody nose for a short period of time however it is not currently bleeding. She denies any current headache. She states she does have pain to the nasal bridge.  S/p left periprosthetic patella open reduction internal fixation (5/2/2021) Subjective   General  Chart Reviewed: Yes  Family / Caregiver Present: No  Subjective  Subjective: Pt denies pain  General Comment  Comments: Patient found supine in bed. Pain Screening  Patient Currently in Pain: Denies  Vital Signs  Patient Currently in Pain: Denies       Orientation  Orientation  Overall Orientation Status: Within Functional Limits     Objective   Bed mobility  Supine to Sit: Moderate assistance(for LEs and trunk; cues for use of bedrail)  Scooting: Stand by assistance(to scoot back on EOB several times)     Transfers  Sit to Stand: Dependent/Total;2 Person Assistance;Minimal Assistance  Stand to sit: Dependent/Total;2 Person Assistance;Minimal Assistance(pt stood 3x from EOB; first stand-mod A of 1, min A for LLE NWB; second stand-min A of 1, CGA for NWB; third stand-min A of 1, pt able to hold LLE off floor without assistance)  Stand Pivot Transfers: Maximum Assistance(pivot with no AD)  Comment: Demonstrated mobility with RW with hop step, and plan to attempt next visit with pediatric RW     Ambulation  Ambulation?: No  WB Status: NWB  Stairs/Curb  Stairs?: No     Balance  Posture: Good  Sitting - Static: Good  Sitting - Dynamic: Fair  Standing - Static: Fair;-  Standing - Dynamic: Poor  Comments: Pt sat EOB with SBA-supervision; pt stood with RW 3x, progressing towards CGA-min A for static standing while maintaining NWB LLE; stood for 1.5 min, 1 min, 3 min with RW     Exercises  Ankle Pumps: pt encouraged to continue APs         AM-PAC Score  AM-PAC Inpatient Mobility Raw Score : 10 (05/05/21 1140)  AM-PAC Inpatient T-Scale Score : 32.29 (05/05/21 1140)  Mobility Inpatient CMS 0-100% Score: 76.75 (05/05/21 1140)  Mobility Inpatient CMS G-Code Modifier : CL (05/05/21 1140)          Goals  Short term goals  Time Frame for Short term goals: By discharge  Short term goal 1: Patient will perform bed mobility with min assist of 1.   Short term goal 2: Patient will perform sit to stand with min assist of 1.-MET 5/5  Short term goal 3: Patient will perform bed to chair transfer with min assist of 1  Short term goal 4: Patient will transfer sit to/from stand with CGA  1 STG met    Plan    Plan  Times per week: 7  Times per day: Daily  Current Treatment Recommendations: Functional Mobility Training, Equipment Evaluation, Education, & procurement, Safety Education & Training, Transfer Training, ROM, Strengthening, Balance Training, ADL/Self-care Training, Endurance Training, Pain Management  Safety Devices  Type of devices: Call light within reach, All fall risk precautions in place, Chair alarm in place, Gait belt, Patient at risk for falls, Left in chair, Nurse notified, Katy Blanks in use     Therapy Time   Individual Concurrent Group Co-treatment   Time In       1050   Time Out       1120   Minutes       30   Timed Code Treatment Minutes: [address] [facility], [address] [facility], 15 [facility]

## 2021-05-05 NOTE — PROGRESS NOTES
Department of Internal Medicine  General Internal Medicine   Progress Note      SUBJECTIVE:  Feeling  Better pain better control ,  No resting  SOB , still n 1.5 L O2  But no temp     General ROS: positive for  - fatigue and malaise fever but no chills   negative for - night sweats or weight loss  Psychological ROS: negative  Ophthalmic ROS: negative  Respiratory ROS: positive for - cough and shortness of breath  negative for - hemoptysis or wheezing  Cardiovascular ROS: positive for - dyspnea on exertion  negative for - chest pain  Gastrointestinal ROS: no abdominal pain, change in bowel habits, or black or bloody stools  Genito-Urinary ROS: no dysuria, trouble voiding, or hematuria  Musculoskeletal ROS: left knee pain   Neurological ROS: no TIA or stroke symptoms  Dermatological ROS: negative    OBJECTIVE      Medications      Current Facility-Administered Medications: glucose (GLUTOSE) 40 % oral gel 15 g, 15 g, Oral, PRN  dextrose 50 % IV solution, 12.5 g, Intravenous, PRN  glucagon (rDNA) injection 1 mg, 1 mg, Intramuscular, PRN  dextrose 5 % solution, 100 mL/hr, Intravenous, PRN  insulin lispro (1 Unit Dial) 0-12 Units, 0-12 Units, Subcutaneous, TID WC  insulin lispro (1 Unit Dial) 0-6 Units, 0-6 Units, Subcutaneous, Nightly  ipratropium-albuterol (DUONEB) nebulizer solution 1 ampule, 1 ampule, Inhalation, Q4H PRN  HYDROcodone-acetaminophen (NORCO) 5-325 MG per tablet 1 tablet, 1 tablet, Oral, Q4H PRN **OR** HYDROcodone-acetaminophen (NORCO) 5-325 MG per tablet 2 tablet, 2 tablet, Oral, Q4H PRN  acetaminophen (TYLENOL) tablet 325 mg, 325 mg, Oral, Q4H PRN  cefepime (MAXIPIME) 1000 mg IVPB minibag, 1,000 mg, Intravenous, Q12H  enoxaparin (LOVENOX) injection 30 mg, 30 mg, Subcutaneous, Daily  doxycycline hyclate (VIBRA-TABS) tablet 100 mg, 100 mg, Oral, 2 times per day  benzocaine-menthol (CEPACOL SORE THROAT) lozenge 1 lozenge, 1 lozenge, Oral, Q2H PRN  perflutren lipid microspheres (DEFINITY) injection 1.65 mg, 1.5 mL, Intravenous, ONCE PRN  pioglitazone (ACTOS) tablet 15 mg, 15 mg, Oral, Daily **AND** metFORMIN (GLUCOPHAGE) tablet 500 mg, 500 mg, Oral, Daily with breakfast  losartan (COZAAR) tablet 100 mg, 100 mg, Oral, Daily  pantoprazole (PROTONIX) tablet 40 mg, 40 mg, Oral, QAM AC  amLODIPine (NORVASC) tablet 10 mg, 10 mg, Oral, Daily  hydroCHLOROthiazide (HYDRODIURIL) tablet 25 mg, 25 mg, Oral, Daily  budesonide-formoterol (SYMBICORT) 160-4.5 MCG/ACT inhaler 2 puff, 2 puff, Inhalation, BID  calcium-vitamin D (OSCAL-500) 500-200 MG-UNIT per tablet 1 tablet, 1 tablet, Oral, BID  morphine (PF) injection 0.5 mg, 0.5 mg, Intravenous, Q4H PRN  sodium chloride (OCEAN, BABY AYR) 0.65 % nasal spray 1 spray, 1 spray, Each Nostril, Q4H PRN  calcium carbonate (TUMS) chewable tablet 500 mg, 500 mg, Oral, TID PRN  Vitamin D (CHOLECALCIFEROL) tablet 2,000 Units, 2,000 Units, Oral, Daily  montelukast (SINGULAIR) tablet 10 mg, 10 mg, Oral, Nightly  atorvastatin (LIPITOR) tablet 10 mg, 10 mg, Oral, Nightly  albuterol sulfate  (90 Base) MCG/ACT inhaler 2 puff, 2 puff, Inhalation, 4x Daily PRN  doxepin (SINEQUAN) capsule 50 mg, 50 mg, Oral, Nightly  benzonatate (TESSALON) capsule 200 mg, 200 mg, Oral, BID PRN  fluticasone (FLONASE) 50 MCG/ACT nasal spray 1 spray, 1 spray, Each Nostril, Daily  sodium chloride flush 0.9 % injection 5-40 mL, 5-40 mL, Intravenous, 2 times per day  sodium chloride flush 0.9 % injection 10 mL, 10 mL, Intravenous, PRN  0.9 % sodium chloride infusion, 25 mL, Intravenous, PRN  [DISCONTINUED] morphine (PF) injection 2 mg, 2 mg, Intravenous, Q2H PRN **OR** morphine injection 4 mg, 4 mg, Intravenous, Q2H PRN  promethazine (PHENERGAN) tablet 12.5 mg, 12.5 mg, Oral, Q6H PRN **OR** ondansetron (ZOFRAN) injection 4 mg, 4 mg, Intravenous, Q6H PRN  magnesium hydroxide (MILK OF MAGNESIA) 400 MG/5ML suspension 30 mL, 30 mL, Oral, Daily PRN  potassium chloride (KLOR-CON M) extended release tablet 40 mEq, 40 mEq, Oral, PRN **OR** potassium bicarb-citric acid (EFFER-K) effervescent tablet 40 mEq, 40 mEq, Oral, PRN **OR** potassium chloride 10 mEq/100 mL IVPB (Peripheral Line), 10 mEq, Intravenous, PRN    Physical      Vitals: /66   Pulse 79   Temp 97.8 °F (36.6 °C) (Oral)   Resp 16   Ht 4' 10\" (1.473 m)   Wt 113 lb (51.3 kg)   SpO2 96%   BMI 23.62 kg/m²   Temp: Temp: 97.8 °F (36.6 °C)  Max: Temp  Av.1 °F (36.7 °C)  Min: 97.8 °F (36.6 °C)  Max: 98.4 °F (36.9 °C)  Respiration range:  Resp  Av  Min: 16  Max: 16  Pulse Range:  Pulse  Av.2  Min: 79  Max: 105  Blood pressure range:  Systolic (33BUJ), VQY:319 , Min:113 , EIX:548   , Diastolic (33XDU), BAV:64, Min:66, Max:77    SpO2  Av.4 %  Min: 94 %  Max: 96 %    Intake/Output Summary (Last 24 hours) at 2021 0715  Last data filed at 2021 0554  Gross per 24 hour   Intake 2641.77 ml   Output 850 ml   Net 1791.77 ml       Vent settings:  Pulse  Av.5  Min: 68  Max: 112  Resp  Av.1  Min: 1  Max: 31  SpO2  Av.5 %  Min: 70 %  Max: 100 %    CONSTITUTIONAL:  fatigued, alert, cooperative, mild distress, appears stated age and thin  EYES:  Unremarkable   NECK:  Mild JVD  and supple, symmetrical, trachea midline  BACK:  symmetric and no curvature  LUNGS:  Few basilar crackles no wheezes   CARDIOVASCULAR:  normal apical pulses, regular rate and rhythm, normal S1 and S2 and no S3  ABDOMEN:  Soft BS + non tender   MUSCULOSKELETAL:  Left knee post op site under  Immobilizer , no distal N/V deficit   NEUROLOGIC:  Essentially intact     SKIN:  Warm and moist  and no bruising or bleeding    Data      Recent Results (from the past 96 hour(s))   POCT Glucose    Collection Time: 21  8:34 AM   Result Value Ref Range    POC Glucose 119 (H) 70 - 99 mg/dl    Performed on ACCU-CHEK    POCT Glucose    Collection Time: 21  4:58 PM   Result Value Ref Range    POC Glucose 184 (H) 70 - 99 mg/dl    Performed on ACCU-CHEK    COVID-19, Rapid    Collection Time: 05/01/21  5:45 PM   Result Value Ref Range    SARS-CoV-2, NAAT Not Detected Not Detected   POCT Glucose    Collection Time: 05/01/21  9:25 PM   Result Value Ref Range    POC Glucose 191 (H) 70 - 99 mg/dl    Performed on ACCU-CHEK    Basic Metabolic Panel    Collection Time: 05/02/21  6:03 AM   Result Value Ref Range    Sodium 132 (L) 136 - 145 mmol/L    Potassium 4.0 3.5 - 5.1 mmol/L    Chloride 93 (L) 99 - 110 mmol/L    CO2 31 21 - 32 mmol/L    Anion Gap 8 3 - 16    Glucose 130 (H) 70 - 99 mg/dL    BUN 19 7 - 20 mg/dL    CREATININE 0.6 0.6 - 1.2 mg/dL    GFR Non-African American >60 >60    GFR African American >60 >60    Calcium 8.6 8.3 - 10.6 mg/dL   CBC Auto Differential    Collection Time: 05/02/21  6:03 AM   Result Value Ref Range    WBC 7.8 4.0 - 11.0 K/uL    RBC 3.93 (L) 4.00 - 5.20 M/uL    Hemoglobin 11.4 (L) 12.0 - 16.0 g/dL    Hematocrit 34.5 (L) 36.0 - 48.0 %    MCV 87.7 80.0 - 100.0 fL    MCH 28.9 26.0 - 34.0 pg    MCHC 33.0 31.0 - 36.0 g/dL    RDW 14.1 12.4 - 15.4 %    Platelets 476 711 - 743 K/uL    MPV 8.3 5.0 - 10.5 fL    Neutrophils % 63.8 %    Lymphocytes % 22.0 %    Monocytes % 12.0 %    Eosinophils % 1.9 %    Basophils % 0.3 %    Neutrophils Absolute 5.0 1.7 - 7.7 K/uL    Lymphocytes Absolute 1.7 1.0 - 5.1 K/uL    Monocytes Absolute 0.9 0.0 - 1.3 K/uL    Eosinophils Absolute 0.2 0.0 - 0.6 K/uL    Basophils Absolute 0.0 0.0 - 0.2 K/uL   APTT    Collection Time: 05/02/21  6:03 AM   Result Value Ref Range    aPTT 33.1 24.2 - 36.2 sec   POCT Glucose    Collection Time: 05/02/21  8:06 AM   Result Value Ref Range    POC Glucose 117 (H) 70 - 99 mg/dl    Performed on ACCU-CHEK    POCT Glucose    Collection Time: 05/02/21 11:25 AM   Result Value Ref Range    POC Glucose 97 70 - 99 mg/dl    Performed on ACCU-CHEK    POCT Glucose    Collection Time: 05/02/21  3:28 PM   Result Value Ref Range    POC Glucose 136 (H) 70 - 99 mg/dl    Performed on ACCU-CHEK    POCT Glucose    Collection Time: 05/02/21 POCT Glucose    Collection Time: 05/04/21  7:14 AM   Result Value Ref Range    POC Glucose 120 (H) 70 - 99 mg/dl    Performed on ACCU-CHEK    CBC    Collection Time: 05/04/21  7:23 AM   Result Value Ref Range    WBC 8.5 4.0 - 11.0 K/uL    RBC 3.59 (L) 4.00 - 5.20 M/uL    Hemoglobin 10.7 (L) 12.0 - 16.0 g/dL    Hematocrit 32.0 (L) 36.0 - 48.0 %    MCV 89.4 80.0 - 100.0 fL    MCH 29.8 26.0 - 34.0 pg    MCHC 33.3 31.0 - 36.0 g/dL    RDW 14.2 12.4 - 15.4 %    Platelets 499 085 - 888 K/uL    MPV 8.0 5.0 - 10.5 fL   Basic Metabolic Panel    Collection Time: 05/04/21  7:23 AM   Result Value Ref Range    Sodium 135 (L) 136 - 145 mmol/L    Potassium 3.5 3.5 - 5.1 mmol/L    Chloride 100 99 - 110 mmol/L    CO2 28 21 - 32 mmol/L    Anion Gap 7 3 - 16    Glucose 138 (H) 70 - 99 mg/dL    BUN 10 7 - 20 mg/dL    CREATININE <0.5 (L) 0.6 - 1.2 mg/dL    GFR Non-African American >60 >60    GFR African American >60 >60    Calcium 8.1 (L) 8.3 - 10.6 mg/dL   POCT Glucose    Collection Time: 05/04/21 11:07 AM   Result Value Ref Range    POC Glucose 111 (H) 70 - 99 mg/dl    Performed on ACCU-CHEK    POCT Glucose    Collection Time: 05/04/21  4:13 PM   Result Value Ref Range    POC Glucose 167 (H) 70 - 99 mg/dl    Performed on ACCU-CHEK    POCT Glucose    Collection Time: 05/04/21  7:36 PM   Result Value Ref Range    POC Glucose 166 (H) 70 - 99 mg/dl    Performed on ACCU-CHEK    POCT Glucose    Collection Time: 05/05/21  7:09 AM   Result Value Ref Range    POC Glucose 135 (H) 70 - 99 mg/dl    Performed on ACCU-CHEK        ASSESSMENT AND PLAN     Principal Problem:    Closed comminuted fracture of left patella, initial encounter  Active Problems:    Controlled type 2 diabetes mellitus without complication, without long-term current use of insulin (HCC)    HTN (hypertension)    Hyperlipidemia    History of total knee arthroplasty, left    Nasal fracture    Right rotator cuff tear arthropathy    Fall from slip, trip, or stumble, initial

## 2021-05-05 NOTE — PROGRESS NOTES
Patient alert and oriented x4, awake in bed. VSS. Shift assessment completed. Patient requesting bed bath, which was completed. Patient complaining of pain, pain medications and AM medications given per MAR. Will continue to monitor.

## 2021-05-05 NOTE — PROGRESS NOTES
Shift assessment completed. Medications given per MAR. Pain controlled with Norco. Patient resting in bed and denying other needs at this time. Call light within reach and fall precaution in place. The care plan and education has been reviewed.

## 2021-05-06 VITALS
HEART RATE: 88 BPM | OXYGEN SATURATION: 94 % | HEIGHT: 58 IN | BODY MASS INDEX: 23.72 KG/M2 | WEIGHT: 113 LBS | DIASTOLIC BLOOD PRESSURE: 74 MMHG | TEMPERATURE: 98.2 F | SYSTOLIC BLOOD PRESSURE: 136 MMHG | RESPIRATION RATE: 16 BRPM

## 2021-05-06 LAB
ANION GAP SERPL CALCULATED.3IONS-SCNC: 13 MMOL/L (ref 3–16)
BUN BLDV-MCNC: 8 MG/DL (ref 7–20)
CALCIUM SERPL-MCNC: 8.7 MG/DL (ref 8.3–10.6)
CHLORIDE BLD-SCNC: 92 MMOL/L (ref 99–110)
CO2: 28 MMOL/L (ref 21–32)
CREAT SERPL-MCNC: <0.5 MG/DL (ref 0.6–1.2)
GFR AFRICAN AMERICAN: >60
GFR NON-AFRICAN AMERICAN: >60
GLUCOSE BLD-MCNC: 113 MG/DL (ref 70–99)
GLUCOSE BLD-MCNC: 181 MG/DL (ref 70–99)
GLUCOSE BLD-MCNC: 181 MG/DL (ref 70–99)
PERFORMED ON: ABNORMAL
PERFORMED ON: ABNORMAL
POTASSIUM SERPL-SCNC: 3.6 MMOL/L (ref 3.5–5.1)
SARS-COV-2, NAAT: NOT DETECTED
SODIUM BLD-SCNC: 133 MMOL/L (ref 136–145)

## 2021-05-06 PROCEDURE — 6370000000 HC RX 637 (ALT 250 FOR IP): Performed by: ORTHOPAEDIC SURGERY

## 2021-05-06 PROCEDURE — 36415 COLL VENOUS BLD VENIPUNCTURE: CPT

## 2021-05-06 PROCEDURE — 87635 SARS-COV-2 COVID-19 AMP PRB: CPT

## 2021-05-06 PROCEDURE — 97530 THERAPEUTIC ACTIVITIES: CPT

## 2021-05-06 PROCEDURE — 99024 POSTOP FOLLOW-UP VISIT: CPT | Performed by: NURSE PRACTITIONER

## 2021-05-06 PROCEDURE — 6360000002 HC RX W HCPCS: Performed by: ORTHOPAEDIC SURGERY

## 2021-05-06 PROCEDURE — 94761 N-INVAS EAR/PLS OXIMETRY MLT: CPT

## 2021-05-06 PROCEDURE — 6370000000 HC RX 637 (ALT 250 FOR IP): Performed by: NURSE PRACTITIONER

## 2021-05-06 PROCEDURE — 80048 BASIC METABOLIC PNL TOTAL CA: CPT

## 2021-05-06 PROCEDURE — 94640 AIRWAY INHALATION TREATMENT: CPT

## 2021-05-06 PROCEDURE — 6370000000 HC RX 637 (ALT 250 FOR IP): Performed by: INTERNAL MEDICINE

## 2021-05-06 PROCEDURE — APPNB45 APP NON BILLABLE 31-45 MINUTES: Performed by: NURSE PRACTITIONER

## 2021-05-06 PROCEDURE — 97116 GAIT TRAINING THERAPY: CPT

## 2021-05-06 RX ADMIN — HYDROCHLOROTHIAZIDE 25 MG: 25 TABLET ORAL at 09:23

## 2021-05-06 RX ADMIN — PANTOPRAZOLE SODIUM 40 MG: 40 TABLET, DELAYED RELEASE ORAL at 07:01

## 2021-05-06 RX ADMIN — ENOXAPARIN SODIUM 30 MG: 30 INJECTION SUBCUTANEOUS at 09:23

## 2021-05-06 RX ADMIN — DOXYCYCLINE HYCLATE 100 MG: 100 TABLET, COATED ORAL at 09:23

## 2021-05-06 RX ADMIN — FLUTICASONE PROPIONATE 1 SPRAY: 50 SPRAY, METERED NASAL at 09:30

## 2021-05-06 RX ADMIN — Medication 1 EACH: at 00:19

## 2021-05-06 RX ADMIN — AMLODIPINE BESYLATE 10 MG: 5 TABLET ORAL at 09:23

## 2021-05-06 RX ADMIN — CEFUROXIME AXETIL 500 MG: 250 TABLET ORAL at 09:23

## 2021-05-06 RX ADMIN — Medication 2 PUFF: at 13:01

## 2021-05-06 RX ADMIN — INSULIN LISPRO 2 UNITS: 100 INJECTION, SOLUTION INTRAVENOUS; SUBCUTANEOUS at 12:50

## 2021-05-06 RX ADMIN — Medication 2000 UNITS: at 09:23

## 2021-05-06 RX ADMIN — HYDROCODONE BITARTRATE AND ACETAMINOPHEN 2 TABLET: 5; 325 TABLET ORAL at 09:23

## 2021-05-06 RX ADMIN — CALCIUM CARBONATE-VITAMIN D TAB 500 MG-200 UNIT 1 TABLET: 500-200 TAB at 09:23

## 2021-05-06 RX ADMIN — HYDROCODONE BITARTRATE AND ACETAMINOPHEN 2 TABLET: 5; 325 TABLET ORAL at 15:25

## 2021-05-06 RX ADMIN — Medication 1 CAPSULE: at 09:24

## 2021-05-06 RX ADMIN — PIOGLITAZONE 15 MG: 15 TABLET ORAL at 09:23

## 2021-05-06 RX ADMIN — LOSARTAN POTASSIUM 100 MG: 100 TABLET, FILM COATED ORAL at 09:23

## 2021-05-06 RX ADMIN — METFORMIN HYDROCHLORIDE 500 MG: 500 TABLET ORAL at 09:37

## 2021-05-06 ASSESSMENT — PAIN DESCRIPTION - ONSET
ONSET: ON-GOING
ONSET: ON-GOING

## 2021-05-06 ASSESSMENT — PAIN DESCRIPTION - DESCRIPTORS
DESCRIPTORS: STABBING
DESCRIPTORS: STABBING

## 2021-05-06 ASSESSMENT — PAIN DESCRIPTION - LOCATION
LOCATION: KNEE

## 2021-05-06 ASSESSMENT — PAIN DESCRIPTION - PAIN TYPE
TYPE: SURGICAL PAIN

## 2021-05-06 ASSESSMENT — PAIN DESCRIPTION - ORIENTATION
ORIENTATION: LEFT
ORIENTATION: LEFT

## 2021-05-06 ASSESSMENT — PAIN SCALES - GENERAL: PAINLEVEL_OUTOF10: 0

## 2021-05-06 ASSESSMENT — PAIN DESCRIPTION - PROGRESSION: CLINICAL_PROGRESSION: GRADUALLY IMPROVING

## 2021-05-06 ASSESSMENT — PAIN DESCRIPTION - FREQUENCY: FREQUENCY: INTERMITTENT

## 2021-05-06 NOTE — PROGRESS NOTES
Assessment complete. Alert, oriented x4. C/o pain to LLE, given PRN Norco per MAR. Dressing to LLE clean, dry, intact. Immobilizer in place to LLE. SCD educated, in place to RLE. IS educated, encouraged. Patient encouraged to get up to [facility], but requested bedpan instead; voided, had BM. Message sent to Dr. Berlin Martinez in regard to patient's home eye medications; OK to order per Berlin Martinez. Weaned off supplemental O2. The care plan and education has been reviewed and mutually agreed upon with the patient. In bed, bed alarm on, bed in lowest position, call light and bedside table within reach. No further needs expressed at this time.

## 2021-05-06 NOTE — CARE COORDINATION
SW ordered Rapid Covid test stat for placement. ADRYAN notified pt's RN, Ally Doran.      Electronically signed by ANGELES Cody on 5/6/2021 at 10:43 AM

## 2021-05-06 NOTE — PROGRESS NOTES
Physical Therapy  Facility/Department: [address] ORTHO/NEURO NURSING  Daily Treatment Note  NAME: Felicia Palma  : 1935  MRN: [id number]    Date of Service: 2021    Discharge Recommendations:  Felicia Palma scored a 13/24 on the AM-PAC short mobility form. Current research shows that an AM-PAC score of 17 or less is typically not associated with a discharge to the patient's home setting. Based on the patient's AM-PAC score and their current functional mobility deficits, it is recommended that the patient have 3-5 sessions per week of Physical Therapy at d/c to increase the patient's independence. Please see assessment section for further patient specific details. If patient discharges prior to next session this note will serve as a discharge summary. Please see below for the latest assessment towards goals. Subacute/Skilled Nursing Facility   PT Equipment Recommendations  Equipment Needed: Yes  Mobility Devices: Pitern Garcia: Rolling  Other: if discharged home would need above DME    Assessment   Body structures, Functions, Activity limitations: Decreased functional mobility ; Decreased endurance;Decreased safe awareness;Decreased strength;Decreased balance  Assessment: Pt is limited by the above deficits and would benefit from skilled PT services to maximize pt functional mobility and safety prior to discharge. Pt progress slowed today due to self-limiting behavior and wasdespite being able to stand with a RW with min A of 1-2 therapists, while maintaining NWB LLE; pt unable to take steps with RW, and required max A to pivot into the chair. Pt will require further skilled rehab prior to discharge home.   Treatment Diagnosis: decreased functional mobility s/p ORIF and NWBing status  Prognosis: Good  Decision Making: Medium Complexity  PT Education: PT Role;Plan of Care;Goals;Weight-bearing Education  Patient Education: d/c recommendations  - Patient verbalized understanding. Barriers to Learning: none  REQUIRES PT FOLLOW UP: Yes  Activity Tolerance  Activity Tolerance: Patient Tolerated treatment well     Patient Diagnosis(es): The primary encounter diagnosis was Closed fracture of nasal bone, initial encounter. Diagnoses of Closed displaced fracture of left patella, unspecified fracture morphology, initial encounter and Fall from slip, trip, or stumble, initial encounter were also pertinent to this visit. has a past medical history of Arthritis, Diabetes mellitus ([address] HYPERCHOLESTERAEMIA, Hypertension, and TIA (transient ischaemic attack). has a past surgical history that includes colectomy; joint replacement (04/22/2010); Total knee arthroplasty (8/19/2010); and Patella fracture surgery (Left, 5/2/2021). Restrictions  Restrictions/Precautions  Restrictions/Precautions: Fall Risk, Weight Bearing, ROM Restrictions(high fall risk, \"absolutely no knee flexion\" per ortho)  Required Braces or Orthoses?: Yes  Lower Extremity Weight Bearing Restrictions  Left Lower Extremity Weight Bearing: Non Weight Bearing  Required Braces or Orthoses  Left Lower Extremity Brace: Knee Immobilizer  Position Activity Restriction  Other position/activity restrictions: Shahab Ramos is a 80 y.o. female patient presents emergency department for evaluation of slip and fall. Patient states she was turning on the light in her home when she fell forward. Patient states she hit her face and forehead on the ground. Patient states she fell onto both knees but is having significant pain and swelling to the left knee. Patient states she feels she cannot move it very well. Patient is also having pain to the posterior right shoulder. Patient denies any loss of consciousness. She takes a baby aspirin daily. She states she had a bloody nose for a short period of time however it is not currently bleeding. She denies any current headache.   She states she does have pain to the nasal bridge. S/p left periprosthetic patella open reduction internal fixation (5/2/2021)  Subjective   General  Chart Reviewed: Yes  Family / Caregiver Present: No  Subjective  Subjective: Pt denies pain. Agreeable to PT/OT  General Comment  Comments: Patient found supine in bed. Orientation  Orientation  Overall Orientation Status: Within Functional Limits  Cognition      Objective   Bed mobility  Supine to Sit: Stand by assistance  Scooting: Stand by assistance  Comment: Max time required; use of bed rail; HOB elevated  Transfers  Sit to Stand: Minimal Assistance  Stand to sit: Dependent/Total(Min A of 2)  Stand Pivot Transfers: Maximum Assistance  Ambulation  Ambulation?: No(Unable to demonstrate Hop step pattern, states \"I'm not ready to do it. \")  WB Status: NWB  Stairs/Curb  Stairs?: No     Balance  Posture: Good  Sitting - Static: Good  Sitting - Dynamic: Fair;+  Standing - Static: Fair;+(with RW)  Standing - Dynamic: Fair(with RW)  Comments: Able to EOB SBA statically; Stood CGA x2 with RW ~3 minutes x2 but unable to advance forward. Pt repeatedly states \"it wasn't time\" or \"I can't do it\" in reference to hopping or SPT. Despite max education on AP, quad sets, heelraises and mini-squats with RW, pt declined to attempt all but AP due to belief that it was too soon. Exercises  Ankle Pumps: x20 B          AM-PAC Score  AM-PAC Inpatient Mobility Raw Score : 13 (05/06/21 1452)  AM-PAC Inpatient T-Scale Score : 36.74 (05/06/21 1452)  Mobility Inpatient CMS 0-100% Score: 64.91 (05/06/21 1452)  Mobility Inpatient CMS G-Code Modifier : CL (05/06/21 1452)          Goals  Short term goals  Time Frame for Short term goals: By discharge  Short term goal 1: Patient will perform bed mobility with min assist of 1.   Short term goal 2: Patient will perform sit to stand with min assist of 1.-MET 5/5  Short term goal 3: Patient will perform bed to chair transfer with min assist of 1  Short term goal 4: Patient will transfer sit to/from stand with CGA  No goals met this treatment. Plan    Plan  Times per week: 7  Times per day: Daily  Current Treatment Recommendations: Functional Mobility Training, Equipment Evaluation, Education, & procurement, Safety Education & Training, Transfer Training, ROM, Strengthening, Balance Training, ADL/Self-care Training, Endurance Training, Pain Management  Safety Devices  Type of devices: Call light within reach, All fall risk precautions in place, Chair alarm in place, Gait belt, Patient at risk for falls, Left in chair, Nurse notified, Clovis Kunz in use     Therapy Time   Individual Concurrent Group Co-treatment   Time In       1337   Time Out       1400   Minutes       23   Timed Code Treatment Minutes: [facility] [address], [address]       I agree with the above note. Goals addressed by PTThuan Suarez, Radha Agustin, DPT [id number]

## 2021-05-06 NOTE — PLAN OF CARE
Problem: Falls - Risk of:  Goal: Will remain free from falls  Description: Will remain free from falls  5/6/2021 0707 by Jessica Valente RN  Outcome: Ongoing  5/6/2021 0034 by Landen Monteiro RN  Outcome: Ongoing     Problem: Falls - Risk of:  Goal: Absence of physical injury  Description: Absence of physical injury  5/6/2021 0707 by Jessica Valente RN  Outcome: Ongoing  5/6/2021 0034 by Landen Monteiro RN  Outcome: Ongoing     Problem: Pain:  Goal: Pain level will decrease  Description: Pain level will decrease  5/6/2021 0707 by Jessica Valente RN  Outcome: Ongoing  5/6/2021 0034 by Landen Motneiro RN  Outcome: Ongoing     Problem: Pain:  Goal: Control of acute pain  Description: Control of acute pain  5/6/2021 0707 by Jessica Valente RN  Outcome: Ongoing  5/6/2021 0034 by Landen Monteiro RN  Outcome: Ongoing

## 2021-05-06 NOTE — PROGRESS NOTES
Patient alert and oriented x4, awake in bed, making multiple demands. Shift assessment completed. Patient complaining of pain, Pain and AM Medications given per MAR. Neuro checks are normal. VSS. Will continue to monitor. 3:05 PM  Called report to [facility], spoke to [place]. 4:41 PM   Transport here to transport patient to [facility].

## 2021-05-06 NOTE — PROGRESS NOTES
Department of Internal Medicine  General Internal Medicine   Progress Note      SUBJECTIVE:  Pain improved , oxygen requirement  Further decreased   General ROS: positive for  - fatigue and malaise fever but no chills   negative for - night sweats or weight loss  Psychological ROS: negative  Ophthalmic ROS: negative  Respiratory ROS: positive for - cough and shortness of breath  negative for - hemoptysis or wheezing  Cardiovascular ROS: positive for - dyspnea on exertion  negative for - chest pain  Gastrointestinal ROS: no abdominal pain, change in bowel habits, or black or bloody stools  Genito-Urinary ROS: no dysuria, trouble voiding, or hematuria  Musculoskeletal ROS: left knee pain   Neurological ROS: no TIA or stroke symptoms  Dermatological ROS: negative    OBJECTIVE      Medications      Current Facility-Administered Medications: cefUROXime (CEFTIN) tablet 500 mg, 500 mg, Oral, 2 times per day  potassium bicarbonate (K-LYTE) disintegrating tablet 25 mEq, 25 mEq, Oral, BID  loperamide (IMODIUM) capsule 2 mg, 2 mg, Oral, 4x Daily PRN  PreserVision AREDS 2 CAPS 1 capsule - PATIENT SUPPLIED, 1 capsule, Oral, BID  Refresh Tears - PATIENT SUPPLIED, 1 drop, Both Eyes, Nightly  Efferdent Denture Cleanser TBEF 1 each, 1 tablet, Does not apply, Nightly  Ketotifen Fumarate 0.035% - PATIENT SUPPLIED, 1 drop, Both Eyes, Nightly  glucose (GLUTOSE) 40 % oral gel 15 g, 15 g, Oral, PRN  dextrose 50 % IV solution, 12.5 g, Intravenous, PRN  glucagon (rDNA) injection 1 mg, 1 mg, Intramuscular, PRN  dextrose 5 % solution, 100 mL/hr, Intravenous, PRN  insulin lispro (1 Unit Dial) 0-12 Units, 0-12 Units, Subcutaneous, TID WC  insulin lispro (1 Unit Dial) 0-6 Units, 0-6 Units, Subcutaneous, Nightly  ipratropium-albuterol (DUONEB) nebulizer solution 1 ampule, 1 ampule, Inhalation, Q4H PRN  HYDROcodone-acetaminophen (NORCO) 5-325 MG per tablet 1 tablet, 1 tablet, Oral, Q4H PRN **OR** HYDROcodone-acetaminophen (NORCO) 5-325 MG per mg, 2 mg, Intravenous, Q2H PRN **OR** morphine injection 4 mg, 4 mg, Intravenous, Q2H PRN  promethazine (PHENERGAN) tablet 12.5 mg, 12.5 mg, Oral, Q6H PRN **OR** ondansetron (ZOFRAN) injection 4 mg, 4 mg, Intravenous, Q6H PRN  magnesium hydroxide (MILK OF MAGNESIA) 400 MG/5ML suspension 30 mL, 30 mL, Oral, Daily PRN  potassium chloride (KLOR-CON M) extended release tablet 40 mEq, 40 mEq, Oral, PRN **OR** potassium bicarb-citric acid (EFFER-K) effervescent tablet 40 mEq, 40 mEq, Oral, PRN **OR** potassium chloride 10 mEq/100 mL IVPB (Peripheral Line), 10 mEq, Intravenous, PRN    Physical      Vitals: /70   Pulse 81   Temp 98 °F (36.7 °C) (Oral)   Resp 16   Ht 4' 10\" (1.473 m)   Wt 113 lb (51.3 kg)   SpO2 92%   BMI 23.62 kg/m²   Temp: Temp: 98 °F (36.7 °C)  Max: Temp  Av.2 °F (36.8 °C)  Min: 98 °F (36.7 °C)  Max: 98.6 °F (37 °C)  Respiration range:  Resp  Av.3  Min: 16  Max: 18  Pulse Range:  Pulse  Av  Min: 81  Max: 92  Blood pressure range:  Systolic (90BTF), FWR:243 , Min:124 , XHM:380   , Diastolic (03LTG), IQV:30, Min:56, Max:73    SpO2  Av.5 %  Min: 92 %  Max: 96 %    Intake/Output Summary (Last 24 hours) at 2021 0904  Last data filed at 2021 2213  Gross per 24 hour   Intake 485 ml   Output 950 ml   Net -465 ml       Vent settings:  Pulse  Av  Min: 68  Max: 112  Resp  Av.4  Min: 1  Max: 31  SpO2  Av.4 %  Min: 70 %  Max: 100 %    CONSTITUTIONAL:  fatigued, alert, cooperative, mild distress, appears stated age and thin  EYES:  Unremarkable   NECK:  Mild JVD  and supple, symmetrical, trachea midline  BACK:  symmetric and no curvature  LUNGS:  Few basilar crackles no wheezes   CARDIOVASCULAR:  normal apical pulses, regular rate and rhythm, normal S1 and S2 and no S3  ABDOMEN:  Soft BS + non tender   MUSCULOSKELETAL:  Left knee post op site under  Immobilizer , no distal N/V deficit   NEUROLOGIC:  Essentially intact     SKIN:  Warm and moist  and no bruising or bleeding    Data      Recent Results (from the past 96 hour(s))   POCT Glucose    Collection Time: 05/02/21 11:25 AM   Result Value Ref Range    POC Glucose 97 70 - 99 mg/dl    Performed on ACCU-CHEK    POCT Glucose    Collection Time: 05/02/21  3:28 PM   Result Value Ref Range    POC Glucose 136 (H) 70 - 99 mg/dl    Performed on ACCU-CHEK    POCT Glucose    Collection Time: 05/02/21  4:53 PM   Result Value Ref Range    POC Glucose 134 (H) 70 - 99 mg/dl    Performed on ACCU-CHEK    POCT Glucose    Collection Time: 05/02/21  8:03 PM   Result Value Ref Range    POC Glucose 224 (H) 70 - 99 mg/dl    Performed on ACCU-CHEK    Hemoglobin and hematocrit, blood    Collection Time: 05/03/21  5:20 AM   Result Value Ref Range    Hemoglobin 10.5 (L) 12.0 - 16.0 g/dL    Hematocrit 31.5 (L) 36.0 - 48.0 %   Basic Metabolic Panel    Collection Time: 05/03/21  5:20 AM   Result Value Ref Range    Sodium 132 (L) 136 - 145 mmol/L    Potassium 3.5 3.5 - 5.1 mmol/L    Chloride 97 (L) 99 - 110 mmol/L    CO2 26 21 - 32 mmol/L    Anion Gap 9 3 - 16    Glucose 215 (H) 70 - 99 mg/dL    BUN 13 7 - 20 mg/dL    CREATININE 0.6 0.6 - 1.2 mg/dL    GFR Non-African American >60 >60    GFR African American >60 >60    Calcium 8.0 (L) 8.3 - 10.6 mg/dL   Hemoglobin A1c    Collection Time: 05/03/21  5:20 AM   Result Value Ref Range    Hemoglobin A1C 6.8 See comment %    eAG 148.5 mg/dL   WBC    Collection Time: 05/03/21  5:20 AM   Result Value Ref Range    WBC 10.0 4.0 - 11.0 K/uL   Platelet count    Collection Time: 05/03/21  5:20 AM   Result Value Ref Range    Platelets 247 840 - 616 K/uL   POCT Glucose    Collection Time: 05/03/21  7:06 AM   Result Value Ref Range    POC Glucose 171 (H) 70 - 99 mg/dl    Performed on ACCU-CHEK    POCT Glucose    Collection Time: 05/03/21 10:58 AM   Result Value Ref Range    POC Glucose 163 (H) 70 - 99 mg/dl    Performed on ACCU-CHEK    POCT Glucose    Collection Time: 05/03/21  3:57 PM   Result Value Ref Range    POC Glucose 132 (H) 70 - 99 mg/dl    Performed on ACCU-CHEK    Culture, Blood 1    Collection Time: 05/03/21  5:46 PM    Specimen: Blood   Result Value Ref Range    Blood Culture, Routine       No Growth to date. Any change in status will be called.    POCT Glucose    Collection Time: 05/03/21  8:44 PM   Result Value Ref Range    POC Glucose 168 (H) 70 - 99 mg/dl    Performed on ACCU-CHEK    POCT Glucose    Collection Time: 05/04/21  7:14 AM   Result Value Ref Range    POC Glucose 120 (H) 70 - 99 mg/dl    Performed on ACCU-CHEK    CBC    Collection Time: 05/04/21  7:23 AM   Result Value Ref Range    WBC 8.5 4.0 - 11.0 K/uL    RBC 3.59 (L) 4.00 - 5.20 M/uL    Hemoglobin 10.7 (L) 12.0 - 16.0 g/dL    Hematocrit 32.0 (L) 36.0 - 48.0 %    MCV 89.4 80.0 - 100.0 fL    MCH 29.8 26.0 - 34.0 pg    MCHC 33.3 31.0 - 36.0 g/dL    RDW 14.2 12.4 - 15.4 %    Platelets 829 437 - 424 K/uL    MPV 8.0 5.0 - 10.5 fL   Basic Metabolic Panel    Collection Time: 05/04/21  7:23 AM   Result Value Ref Range    Sodium 135 (L) 136 - 145 mmol/L    Potassium 3.5 3.5 - 5.1 mmol/L    Chloride 100 99 - 110 mmol/L    CO2 28 21 - 32 mmol/L    Anion Gap 7 3 - 16    Glucose 138 (H) 70 - 99 mg/dL    BUN 10 7 - 20 mg/dL    CREATININE <0.5 (L) 0.6 - 1.2 mg/dL    GFR Non-African American >60 >60    GFR African American >60 >60    Calcium 8.1 (L) 8.3 - 10.6 mg/dL   POCT Glucose    Collection Time: 05/04/21 11:07 AM   Result Value Ref Range    POC Glucose 111 (H) 70 - 99 mg/dl    Performed on ACCU-CHEK    POCT Glucose    Collection Time: 05/04/21  4:13 PM   Result Value Ref Range    POC Glucose 167 (H) 70 - 99 mg/dl    Performed on ACCU-CHEK    POCT Glucose    Collection Time: 05/04/21  7:36 PM   Result Value Ref Range    POC Glucose 166 (H) 70 - 99 mg/dl    Performed on ACCU-CHEK    POCT Glucose    Collection Time: 05/05/21  7:09 AM   Result Value Ref Range    POC Glucose 135 (H) 70 - 99 mg/dl    Performed on ACCU-CHEK    CBC    Collection Time: 05/05/21  9:27 AM   Result Value Ref Range    WBC 6.7 4.0 - 11.0 K/uL    RBC 3.49 (L) 4.00 - 5.20 M/uL    Hemoglobin 10.6 (L) 12.0 - 16.0 g/dL    Hematocrit 30.8 (L) 36.0 - 48.0 %    MCV 88.2 80.0 - 100.0 fL    MCH 30.3 26.0 - 34.0 pg    MCHC 34.3 31.0 - 36.0 g/dL    RDW 14.1 12.4 - 15.4 %    Platelets 109 600 - 085 K/uL    MPV 7.7 5.0 - 10.5 fL   Basic metabolic panel    Collection Time: 05/05/21  9:27 AM   Result Value Ref Range    Sodium 133 (L) 136 - 145 mmol/L    Potassium 3.1 (L) 3.5 - 5.1 mmol/L    Chloride 95 (L) 99 - 110 mmol/L    CO2 30 21 - 32 mmol/L    Anion Gap 8 3 - 16    Glucose 245 (H) 70 - 99 mg/dL    BUN 10 7 - 20 mg/dL    CREATININE 0.5 (L) 0.6 - 1.2 mg/dL    GFR Non-African American >60 >60    GFR African American >60 >60    Calcium 8.4 8.3 - 10.6 mg/dL   POCT Glucose    Collection Time: 05/05/21 11:24 AM   Result Value Ref Range    POC Glucose 205 (H) 70 - 99 mg/dl    Performed on ACCU-CHEK    POCT Glucose    Collection Time: 05/05/21  4:17 PM   Result Value Ref Range    POC Glucose 115 (H) 70 - 99 mg/dl    Performed on ACCU-CHEK    POCT Glucose    Collection Time: 05/05/21  8:33 PM   Result Value Ref Range    POC Glucose 176 (H) 70 - 99 mg/dl    Performed on ACCU-CHEK    POCT Glucose    Collection Time: 05/06/21  7:21 AM   Result Value Ref Range    POC Glucose 113 (H) 70 - 99 mg/dl    Performed on ACCU-CHEK        ASSESSMENT AND PLAN     Principal Problem:    Closed comminuted fracture of left patella, initial encounter  Active Problems:    Controlled type 2 diabetes mellitus without complication, without long-term current use of insulin (HCC)    HTN (hypertension)    Hyperlipidemia    History of total knee arthroplasty, left    Nasal fracture    Right rotator cuff tear arthropathy    Fall from slip, trip, or stumble, initial encounter  Resolved Problems:    * No resolved hospital problems.  *    Supplement K    switch to PO ceftin   OK for dismissal when Pre-cert obtained

## 2021-05-06 NOTE — PROGRESS NOTES
Occupational Therapy  Facility/Department: [address] ORTHO/NEURO NURSING  Daily Treatment Note  NAME: Karlie Carter  : 1935  MRN: [id number]    Date of Service: 2021    Discharge Recommendations:  Karlie Carter scored a 13/24 on the AM-PAC ADL Inpatient form. Current research shows that an AM-PAC score of 17 or less is typically not associated with a discharge to the patient's home setting. Based on the patient's AM-PAC score and their current ADL deficits, it is recommended that the patient have 3-5 sessions per week of Occupational Therapy at d/c to increase the patient's independence. Please see assessment section for further patient specific details. If patient discharges prior to next session this note will serve as a discharge summary. Please see below for the latest assessment towards goals. OT Equipment Recommendations  Equipment Needed: No    Assessment   Performance deficits / Impairments: Decreased functional mobility ; Decreased ADL status; Decreased endurance;Decreased balance;Decreased high-level IADLs  Assessment: Pt is below baseline leve of occupational function, pt struggled to stand and maintain NWB status stating \"I'm too weak and just need to rest for a few days\", pt would benefit from continued skilled OT services to address these deficits and return to PLOF. Treatment Diagnosis: Decreased ADL/IADL status, functional mobility, endurance, and balance associted with closed L patella fx, s/p ORIF  Prognosis: Good  History: Pt 79 yo, lives alone in apt w/elevator, I ADLs, gets some assist w/IADLs, good family support, retired. PMH: DM, HTN, TIA, L & R TKA  Exam: 6 clicks, ADL & functional mobility assessment, 5 performance deficits/impairments, stable presentation  Assistance / Modification: Max A of 2 for bed mobility, D of 3 for transfer, Mod A UB dressing, combing hair  OT Education: Plan of Care;Precautions;Transfer Training;Orientation; ADL Adaptive move it very well. Patient is also having pain to the posterior right shoulder. Patient denies any loss of consciousness. She takes a baby aspirin daily. She states she had a bloody nose for a short period of time however it is not currently bleeding. She denies any current headache. She states she does have pain to the nasal bridge. S/p left periprosthetic patella open reduction internal fixation (5/2/2021)  Subjective   General  Chart Reviewed: Yes  Patient assessed for rehabilitation services?: Yes  Response to previous treatment: Patient with no complaints from previous session  Family / Caregiver Present: No  Referring Practitioner: Conrad Bird MD, for d/c planning  Diagnosis: Closed fx, L patella, s/p ORIF 5/2  Subjective  Subjective: Pt supine in bed upon arrival, agreeable to cotx, pt denies pain. Pre Treatment Pain Screening  Intervention List: Patient able to continue with treatment;Nurse/Physician notified  Vital Signs  Patient Currently in Pain: Denies   Orientation  Orientation  Overall Orientation Status: Within Functional Limits  Objective    ADL  Additional Comments: Not addressed, pt declined ADL's. Balance  Sitting Balance: Stand by assistance  Standing Balance  Time: ~20 seconds total  Comment: In stance at EOB  Functional Mobility  Functional Mobility Comments: Not addressed, pt unable to ambulate. Bed mobility  Supine to Sit: Stand by assistance(Increased time to complete.)  Scooting: Stand by assistance  Transfers  Stand Pivot Transfers: Maximum assistance(EOB>recliner)  Sit to stand: Minimal assistance(Min of 1)  Stand to sit: Dependent/Total(Min of 2)  Transfer Comments: Verbal cues for hand placement. Cognition  Overall Cognitive Status: WFL  Arousal/Alertness: Appropriate responses to stimuli  Following Commands:  Follows one step commands consistently  Attention Span: Appears intact  Memory: Appears intact  Safety Judgement: Good awareness of safety precautions  Insights: Fully aware of deficits  Initiation: Does not require cues  Sequencing: Requires cues for some     Plan   Plan  Times per week: 7  Times per day: Daily  Current Treatment Recommendations: Safety Education & Training, Self-Care / ADL, Endurance Training, Functional Mobility Training, Balance Training, Patient/Caregiver Education & Training    AM-PAC Score        AM-PAC Inpatient Daily Activity Raw Score: 13 (05/06/21 1422)  AM-PAC Inpatient ADL T-Scale Score : 32.03 (05/06/21 1422)  ADL Inpatient CMS 0-100% Score: 63.03 (05/06/21 1422)  ADL Inpatient CMS G-Code Modifier : CL (05/06/21 1422)    Goals  Short term goals  Time Frame for Short term goals: Discharge  Short term goal 1: Mod A for bed mobility--- SBA 5/6  Short term goal 2: Mod A for functional transfers to ADL surfaces maintaining NWB--- Max A 5/6  Short term goal 3: Min A for UB bathing/dressing--- Not addressed, pt declined  5/6  Short term goal 4: Min A for LB bathing/dressing w/AE---- Not addressd, pt declined 5/6       Therapy Time   Individual Concurrent Group Co-treatment   Time In       0137   Time Out       0200   Minutes       23   Timed Code Treatment Minutes: 23 Minutes     Total Treatment Minutes:  23 Minutes      Jenaro Mac S/LENNIE     C/ Michael 66, OT   I have directly observed this treatment and have read and approve this note.    Kylie Garcia., OTR/L, [id number]

## 2021-05-06 NOTE — PLAN OF CARE
Problem: Falls - Risk of:  Goal: Will remain free from falls  Description: Will remain free from falls  5/6/2021 1506 by Vivian Wilkinson RN  Outcome: Completed  5/6/2021 0707 by Vivian Wilkinson RN  Outcome: Ongoing     Problem: Falls - Risk of:  Goal: Absence of physical injury  Description: Absence of physical injury  5/6/2021 1506 by Vivian Wilkinson RN  Outcome: Completed  5/6/2021 0707 by Vivian Wilkinson RN  Outcome: Ongoing     Problem: Pain:  Goal: Pain level will decrease  Description: Pain level will decrease  5/6/2021 1506 by Vivian Wilkinson RN  Outcome: Completed  5/6/2021 0707 by Vivian Wilkinson RN  Outcome: Ongoing     Problem: Pain:  Goal: Control of acute pain  Description: Control of acute pain  5/6/2021 1506 by Vivian Wilkinson RN  Outcome: Completed  5/6/2021 0707 by Vivian Wilkinson RN  Outcome: Ongoing

## 2021-05-06 NOTE — CARE COORDINATION
Discharge Plan:      Patient discharged TO FACILITY: [address]  PHONE: [phone]  FAX: [phone]  SW/DC Planner faxed, [address]   Narcotic Prescriptions faxed were: not applicable  RN: Stiven Flores will call report      Medical Transport with: [facility] [phone]   time: 3:30 PM  Family advised of discharge?: ADRYAN spoke with patient's daughter, Seven Dunne. HENS Submitted?:  Yes  All discharge needs met per case management.     ANGELES Jaime, [address]  [facility]  [phone]

## 2021-05-06 NOTE — PROGRESS NOTES
[facility] Orthopedic Surgery   Progress Note    CHIEF COMPLAINT/DIAGNOSIS: S/p left periprosthetic patella open reduction internal fixation (5/2/2021)    SUBJECTIVE: The patient is sitting up in bed. Denies new issues. KI in place. Denies SOB. OBJECTIVE  Physical    VITALS:  /74   Pulse 94   Temp 98.2 °F (36.8 °C) (Oral)   Resp 16   Ht 4' 10\" (1.473 m)   Wt 113 lb (51.3 kg)   SpO2 93%   BMI 23.62 kg/m²     GENERAL: Alert and oriented x3, in no acute distress. MUSCULOSKELETAL: Able to dorsi and plantarflex the ankle without issue. INCISION:  Covered with post-op dressing/ACE, c/d/i. KI replaced. ROM: Continue immobilizer  Sensory:  Intact to light touch in peroneal and tibial distributions. Vascular:   1+ DP pulses with brisk cap refill;  calf soft and nontender    Data    ALL MEDICATIONS HAVE BEEN REVIEWED    CBC:   Recent Labs     05/04/21  0723 05/05/21  0927   WBC 8.5 6.7   HGB 10.7* 10.6*   HCT 32.0* 30.8*    210     BMP:   Recent Labs     05/04/21  0723 05/05/21  0927   * 133*   K 3.5 3.1*    95*   CO2 28 30   BUN 10 10   CREATININE <0.5* 0.5*     INR:   No results for input(s): INR in the last 72 hours. ASSESSMENT:  S/p LEFT periprosthetic patellar ORIF (5/2/21), POD#4  DM II  HTN  HLD  CAD    PLAN:   - WB status:  NWB; continue Knee Immobilizer; reviewed post op precautions  - DVT prophylaxis: Lovenox as inpt. Rec d/c with ASA 81mg BID x 30 days  - PT/OT  - Pain Control: Continue norco prn (script in chart). Due to orthopaedic surgical procedure/condition, patient may require pain medication for up to 6-8 weeks. - Expected post op acute blood loss anemia: H/H yesterday: 10.7/32  - ID: Ancef x2 doses postoperative completed. 10 days doxycycline started 5/3  - Dispo: SNF when medically ready    Follow-up with Dr. Dorothy Milian in 2 weeks.   [address], APRN-CNP  5/6/2021  10:16 AM

## 2021-05-07 LAB — BLOOD CULTURE, ROUTINE: NORMAL

## 2021-05-14 ENCOUNTER — OFFICE VISIT (OUTPATIENT)
Dept: ORTHOPEDIC SURGERY | Age: 86
End: 2021-05-14
Payer: MEDICARE

## 2021-05-14 VITALS — WEIGHT: 113 LBS | HEIGHT: 58 IN | BODY MASS INDEX: 23.72 KG/M2

## 2021-05-14 DIAGNOSIS — Z96.659 PERIPROSTHETIC FRACTURE OF PATELLA, SUBSEQUENT ENCOUNTER: Primary | ICD-10-CM

## 2021-05-14 DIAGNOSIS — M97.8XXD PERIPROSTHETIC FRACTURE OF PATELLA, SUBSEQUENT ENCOUNTER: Primary | ICD-10-CM

## 2021-05-14 PROCEDURE — 99024 POSTOP FOLLOW-UP VISIT: CPT | Performed by: ORTHOPAEDIC SURGERY

## 2021-05-14 PROCEDURE — L1832 KO ADJ JNT POS R SUP PRE CST: HCPCS | Performed by: ORTHOPAEDIC SURGERY

## 2021-05-14 RX ORDER — HYDROCODONE BITARTRATE AND ACETAMINOPHEN 5; 325 MG/1; MG/1
2 TABLET ORAL EVERY 6 HOURS PRN
COMMUNITY
End: 2021-09-28

## 2021-05-14 NOTE — PROGRESS NOTES
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       calcium carbonate (TUMS) 500 MG chewable tablet Take 1 tablet by mouth 3 times daily as needed for Heartburn      enoxaparin (LOVENOX) 30 MG/0.3ML injection Inject 0.3 mLs into the skin daily for 10 days 3 mL 0    glucose (GLUTOSE) 40 % GEL Take 37.5 mLs by mouth as needed (low Blood sugar) 45 g 1    sodium chloride (OCEAN, BABY AYR) 0.65 % nasal spray 1 spray by Nasal route every 4 hours as needed for Congestion  0    benzocaine-menthol (CEPACOL SORE THROAT) 15-3.6 MG lozenge Take 1 lozenge by mouth every 2 hours as needed for Sore Throat 30 lozenge     doxycycline hyclate (VIBRA-TABS) 100 MG tablet Take 1 tablet by mouth every 12 hours for 10 days 20 tablet 0    potassium bicarbonate (K-LYTE) 25 MEQ disintegrating tablet Take 1 tablet by mouth 2 times daily 60 tablet 3    aspirin 81 MG EC tablet Take 1 tablet by mouth 2 times daily 60 tablet 0    Carboxymethylcellul-Glycerin (REFRESH OPTIVE) 0.5-0.9 % SOLN Apply 1 drop to eye nightly Both eyes nightly      Biotin 1000 MCG TABS Take 1 tablet by mouth daily      vitamin B-12 (CYANOCOBALAMIN) 1000 MCG tablet Take 1,000 mcg by mouth daily      pioglitazone-metFORMIN (ACTOPLUS MET)  MG per tablet Take 1 tablet by mouth daily 90 tablet 0    olmesartan-amLODIPine-HCTZ 40-10-25 MG TABS Take 1 tablet by mouth daily 90 tablet 1    doxepin (SINEQUAN) 50 MG capsule Take 1 capsule by mouth nightly 90 capsule 1    omeprazole (PRILOSEC) 40 MG delayed release capsule Take 1 capsule by mouth daily 90 capsule 1    budesonide-formoterol (SYMBICORT) 160-4.5 MCG/ACT AERO Inhale 2 puffs into the lungs 2 times daily 3 Inhaler 1    benzonatate (TESSALON) 200 MG capsule Take 1 capsule by mouth 2 times daily as needed for Cough 60 capsule 2    fluticasone (FLONASE) 50 MCG/ACT nasal spray 1 spray by Each Nostril route daily 2 Bottle 5    albuterol sulfate HFA (VENTOLIN HFA) 108 (90 Base) MCG/ACT inhaler Inhale 2 puffs into the lungs 4 times daily as needed for Wheezing 3 Inhaler 1    montelukast (SINGULAIR) 10 MG tablet Take 1 tablet by mouth daily 30 tablet 3    blood glucose monitor kit and supplies Test 2 times a day & as needed for symptoms of irregular blood glucose. 1 kit 0    blood glucose monitor strips Test 1 times a day & as needed for symptoms of irregular blood glucose. 50 strip 0    Lancets MISC 1 each by Does not apply route 2 times daily 100 each 5    Alcohol Swabs PADS 1 each by Does not apply route daily 100 each 2    Cholecalciferol (VITAMIN D3) 2000 units CAPS Take 1 capsule by mouth daily 90 capsule 0    blood glucose test strips (ASCENSIA AUTODISC VI;ONE TOUCH ULTRA TEST VI) strip 1 each by In Vitro route daily As needed. 100 each 3    calcium carbonate-vitamin D (CALCIUM 600 + D) 600-400 MG-UNIT TABS per tab Take 1 tablet by mouth daily (Patient taking differently: Take 1 tablet by mouth 2 times daily ) 90 tablet 0    Denture Care Products (POLIDENT 3 MINUTE) TBEF 1 tablet by Does not apply route daily 30 tablet 5    Multiple Vitamin (MULTIVITAMIN PO) Take 1 tablet by mouth daily.  atorvastatin (LIPITOR) 10 MG tablet Take 1 tablet by mouth daily 90 tablet 3     No current facility-administered medications for this visit. Vitals:    05/14/21 0925   Weight: 113 lb (51.3 kg)   Height: 4' 10\" (1.473 m)       Physical Exam:  Body mass index is 23.62 kg/m².   [facility]

## 2021-05-14 NOTE — PROGRESS NOTES
Patient seen , discharge dictated scripts given , arrangements made , JENNIE completed .  Discussed with nursing staff  And   If applicable ,  Discussed with  Patient's family , all questions answered and concerns addressed  When applicable

## 2021-05-15 NOTE — DISCHARGE SUMMARY
[facility]                     [facility], [address]                               DISCHARGE SUMMARY    PATIENT NAMESnelly HAHN                 :        1935  MED REC NO:   [id number]                          ROOM:       4480  ACCOUNT NO:   [de-identified]                           ADMIT DATE: 2021  PROVIDER:     Quang Whyte MD                  DISCHARGE DATE:  2021    FINAL DIAGNOSES:  1. Comminuted fracture closed left patella. 2.  Hypertension. 3.  Type 2 diabetes mellitus. 4.  History of CVA. 5.  Hyperlipidemia. DISCHARGE MEDICATIONS:  1. Olmesartan 40 mg once a day. 2.  Norco 5/325 one every 4 hours p.r.n.  3.  Atorvastatin 20 mg once a day. 4.  Actos 15 mg once a day. 5.  Metformin 1000 mg once a day. 6.  Baby aspirin once a day. 7.  Calcium carbonate 500 mg three times a day. 8.  Lovenox 0.3 mL _____ daily for 10 days. 9.  Glutose gel p.r.n. for low blood sugar. 10.  Cefuroxime 500 mg p.o. b.i.d. for 7 days. 11.  Ocean nasal saline spray as directed. 12.  Cepacol lozenges p.r.n. 13.  Vibra-Tabs 100 mg twice a day for 10 days. 14.  Potassium bicarbonate one tablet two times a day. 15.  Carboxymethylcellulose eyedrops as directed. 16.  Pioglitazone metformin combination daily as directed. 17.  Olmesartan amlodipine hydrochlorothiazide 40/10/25 combination. 18.  Doxepin 50 mg nightly. 19.  Prilosec 40 mg once a day. 20.  Symbicort 160/4.5 two puffs twice a day. 21.  Flonase nasal spray one spray each nostril at bedtime. 22.  Atorvastatin 10 mg once a day. 23.  Albuterol sulfate two puffs every 4 hours p.r.n.  24.  Singulair 10 mg once a day. 25.  Cholecalciferol 2000 units daily. 32.  Breo was discontinued.     HOSPITAL COURSE:  This elderly woman came to the emergency room who had  an accidental fall on a slippery surface at home, was brought in with  history of severe left knee pain, hemarthrosis, severe swelling, unable  to walk, was diagnosed to have closed comminuted fracture of the left  patella. Dr. Lauren Valdez admitted the patient in my absence and did the  preoperative clearance. Dr. Rasheed Alvarez did the open reduction and  internal fixation with sutures. Please refer to his surgical note. Postoperatively, the patient was monitored for infection. Given DVT  prophylaxis, given postoperative pain relief, PT and OT evaluation. After 3-4 days, the patient needed to be placed to skilled nursing  facility. She did not qualify for acute rehab. Her blood sugar  remained under control, was given sliding scale coverage. The patient's  knee implant prosthesis for the total knee replacement were intact  without any damage to it. The patient was advised nonweightbearing for  two weeks and also no range of motion exercise for six weeks. The  patient will be following up with Dr. Rasheed Alvarez as an outpatient. The  patient was transferred in stable condition to [facility] where she will  be doing the rehab care. Ongoing care provider after the dismissal from  [facility] would be myself since I am her primary care physician. The  patient was discharged in stable condition to [facility] on 05/06.         Shantal Giraldo MD    D: 05/14/2021 14:00:36       T: 05/15/2021 2:24:03     [email or web address]  Job#: [id number]     Doc#: [id number]    CC:

## 2021-05-17 ENCOUNTER — TELEPHONE (OUTPATIENT)
Dept: ORTHOPEDIC SURGERY | Age: 86
End: 2021-05-17

## 2021-06-01 ENCOUNTER — OFFICE VISIT (OUTPATIENT)
Dept: ORTHOPEDIC SURGERY | Age: 86
End: 2021-06-01

## 2021-06-01 VITALS — HEIGHT: 58 IN | WEIGHT: 113 LBS | BODY MASS INDEX: 23.72 KG/M2

## 2021-06-01 DIAGNOSIS — M97.8XXD PERIPROSTHETIC FRACTURE OF PATELLA, SUBSEQUENT ENCOUNTER: Primary | ICD-10-CM

## 2021-06-01 DIAGNOSIS — Z96.659 PERIPROSTHETIC FRACTURE OF PATELLA, SUBSEQUENT ENCOUNTER: Primary | ICD-10-CM

## 2021-06-01 PROCEDURE — 99024 POSTOP FOLLOW-UP VISIT: CPT | Performed by: ORTHOPAEDIC SURGERY

## 2021-06-01 NOTE — PROGRESS NOTES
ORTHOPAEDIC PROGRESS NOTE    Chief Complaint   Patient presents with    Post-Op Check     ORIF left periprosthetic patella Fx; DOS        HPI   6/1/2021 5/14/2021  Postop office check  No major issues  No pain currently  Denies N/T      5/2/2021  OPERATION PERFORMED:  Open treatment of the left periprosthetic patellar fracture with internal fixation using nonabsorbable braided sutures. Past Medical History:   Diagnosis Date    Arthritis     Diabetes mellitus ([address]     HYPERCHOLESTERAEMIA     Hypertension     TIA (transient ischaemic attack) 2003       Past Surgical History:   Procedure Laterality Date    COLECTOMY      JOINT REPLACEMENT  04/22/2010    left knee    PATELLA FRACTURE SURGERY Left 5/2/2021    PATELLA OPEN REDUCTION INTERNAL FIXATION performed by Tony Reeder MD at [place] [address]  8/19/2010    right, cemented       Social History     Socioeconomic History    Marital status:      Spouse name: Not on file    Number of children: Not on file    Years of education: Not on file    Highest education level: Not on file   Occupational History    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 status: Never Smoker    Smokeless tobacco: Never Used   Substance and Sexual Activity    Alcohol use: No    Drug use: No    Sexual activity: Never   Other Topics Concern    Not on file   Social History Narrative     ,  4 children grown , 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[facility], worked as a school Volunteer      Social Determinants of Health     Financial Resource Strain:     Difficulty of Paying Living Expenses:    Food Insecurity:     Worried About [address] in the Last Year:    [address] in the Last Year:    Transportation Needs:     Lack of Transportation (Medical):      Lack of Transportation (Non-Medical):    Physical Activity:     Days of Exercise per Week:     Minutes of Exercise per Session: Stress:     Feeling of Stress :    Social Connections:     Frequency of Communication with Friends and Family:     Frequency of Social Gatherings with Friends and Family:     Attends Nondenominational Services:     Active Member of Clubs or Organizations:     Attends Club or Organization Meetings:     Marital Status:    Intimate Partner Violence:     Fear of Current or Ex-Partner:     Emotionally Abused:     Physically Abused:     Sexually Abused:        Current Outpatient Medications   Medication Sig Dispense Refill    HYDROcodone-acetaminophen (NORCO) 5-325 MG per tablet Take 2 tablets by mouth every 6 hours as needed for Pain.       calcium carbonate (TUMS) 500 MG chewable tablet Take 1 tablet by mouth 3 times daily as needed for Heartburn      glucose (GLUTOSE) 40 % GEL Take 37.5 mLs by mouth as needed (low Blood sugar) 45 g 1    sodium chloride (OCEAN, BABY AYR) 0.65 % nasal spray 1 spray by Nasal route every 4 hours as needed for Congestion  0    benzocaine-menthol (CEPACOL SORE THROAT) 15-3.6 MG lozenge Take 1 lozenge by mouth every 2 hours as needed for Sore Throat 30 lozenge     potassium bicarbonate (K-LYTE) 25 MEQ disintegrating tablet Take 1 tablet by mouth 2 times daily 60 tablet 3    aspirin 81 MG EC tablet Take 1 tablet by mouth 2 times daily 60 tablet 0    Carboxymethylcellul-Glycerin (REFRESH OPTIVE) 0.5-0.9 % SOLN Apply 1 drop to eye nightly Both eyes nightly      Biotin 1000 MCG TABS Take 1 tablet by mouth daily      vitamin B-12 (CYANOCOBALAMIN) 1000 MCG tablet Take 1,000 mcg by mouth daily      pioglitazone-metFORMIN (ACTOPLUS MET)  MG per tablet Take 1 tablet by mouth daily 90 tablet 0    olmesartan-amLODIPine-HCTZ 40-10-25 MG TABS Take 1 tablet by mouth daily 90 tablet 1    doxepin (SINEQUAN) 50 MG capsule Take 1 capsule by mouth nightly 90 capsule 1    omeprazole (PRILOSEC) 40 MG delayed release capsule Take 1 capsule by mouth daily 90 capsule 1   

## 2021-06-17 RX ORDER — ALBUTEROL SULFATE 90 UG/1
2 AEROSOL, METERED RESPIRATORY (INHALATION) 4 TIMES DAILY PRN
Qty: 3 INHALER | Refills: 1 | Status: SHIPPED | OUTPATIENT
Start: 2021-06-17 | End: 2021-11-26 | Stop reason: SDUPTHER

## 2021-06-17 RX ORDER — BUDESONIDE AND FORMOTEROL FUMARATE DIHYDRATE 160; 4.5 UG/1; UG/1
2 AEROSOL RESPIRATORY (INHALATION) 2 TIMES DAILY
Qty: 3 INHALER | Refills: 1 | Status: SHIPPED | OUTPATIENT
Start: 2021-06-17 | End: 2022-07-23

## 2021-06-29 ENCOUNTER — OFFICE VISIT (OUTPATIENT)
Dept: ORTHOPEDIC SURGERY | Age: 86
End: 2021-06-29

## 2021-06-29 VITALS — RESPIRATION RATE: 16 BRPM | WEIGHT: 113 LBS | BODY MASS INDEX: 23.72 KG/M2 | HEIGHT: 58 IN

## 2021-06-29 DIAGNOSIS — Z96.659 PERIPROSTHETIC FRACTURE OF PATELLA, SUBSEQUENT ENCOUNTER: Primary | ICD-10-CM

## 2021-06-29 DIAGNOSIS — M97.8XXD PERIPROSTHETIC FRACTURE OF PATELLA, SUBSEQUENT ENCOUNTER: Primary | ICD-10-CM

## 2021-06-29 PROCEDURE — 99024 POSTOP FOLLOW-UP VISIT: CPT | Performed by: ORTHOPAEDIC SURGERY

## 2021-06-29 NOTE — PROGRESS NOTES
ORTHOPAEDIC PROGRESS NOTE    Chief Complaint   Patient presents with    Post-Op Check     ORIF left patella 5/2/2021       HPI   6/29/2021  Follow-up left periprosthetic patella fracture  she is approximately 2 months postop now  She denies any pain  She is using the knee brace locked in full extension  No wound issues, no drainage  Denies numbness and tingling      6/1/2021  No issues      5/14/2021  Postop office check  No major issues  No pain currently  Denies N/T      5/2/2021  OPERATION PERFORMED:  Open treatment of the left periprosthetic patellar fracture with internal fixation using nonabsorbable braided sutures.       Past Medical History:   Diagnosis Date    Arthritis     Diabetes mellitus ([address]     HYPERCHOLESTERAEMIA     Hypertension     TIA (transient ischaemic attack) 2003       Past Surgical History:   Procedure Laterality Date    COLECTOMY      JOINT REPLACEMENT  04/22/2010    left knee    PATELLA FRACTURE SURGERY Left 5/2/2021    PATELLA OPEN REDUCTION INTERNAL FIXATION performed by Lora Londono MD at [address]  8/19/2010    right, cemented       Social History     Socioeconomic History    Marital status:      Spouse name: Not on file    Number of children: Not on file    Years of education: Not on file    Highest education level: Not on file   Occupational History    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

## 2021-07-08 ENCOUNTER — TELEPHONE (OUTPATIENT)
Dept: ORTHOPEDIC SURGERY | Age: 86
End: 2021-07-08

## 2021-07-08 NOTE — TELEPHONE ENCOUNTER
Called and spoke with Chandnirasheed Joseph and told her that the patient is in the proper brace for her injury, and that if we give her a new one that the patient will be finacially responsible for this. She again stated she needed a new brace and I stressed she just needs to come back in and let us fix this brace properly.    Made her an appt for Tuesday to come in to adjust her brace

## 2021-07-08 NOTE — TELEPHONE ENCOUNTER
Jerson NOLEN CALLED STATES THAT PATIENT HAS AN ILL FITTING KNEE BRACE AND NEEDING TO BE FITTED FOR NEW ONE. TIME SENSITIVE FOR INSURANCE PURPOSES.  PLEASE CONTACT TO ADVISE IF THIS CAN BE DONE [phone]

## 2021-07-13 ENCOUNTER — OFFICE VISIT (OUTPATIENT)
Dept: ORTHOPEDIC SURGERY | Age: 86
End: 2021-07-13

## 2021-07-13 ENCOUNTER — TELEPHONE (OUTPATIENT)
Dept: ORTHOPEDIC SURGERY | Age: 86
End: 2021-07-13

## 2021-07-13 VITALS — WEIGHT: 113 LBS | RESPIRATION RATE: 16 BRPM | HEIGHT: 58 IN | BODY MASS INDEX: 23.72 KG/M2

## 2021-07-13 DIAGNOSIS — Z96.659 PERIPROSTHETIC FRACTURE OF PATELLA, SUBSEQUENT ENCOUNTER: Primary | ICD-10-CM

## 2021-07-13 DIAGNOSIS — M97.8XXD PERIPROSTHETIC FRACTURE OF PATELLA, SUBSEQUENT ENCOUNTER: Primary | ICD-10-CM

## 2021-07-13 PROCEDURE — 99024 POSTOP FOLLOW-UP VISIT: CPT | Performed by: ORTHOPAEDIC SURGERY

## 2021-07-13 NOTE — PROGRESS NOTES
ORTHOPAEDIC PROGRESS NOTE    Chief Complaint   Patient presents with    Follow-up     left patella ORIF 5/2/2021       HPI   7/13/21  Patient returns to clinic today again for left knee  She is here with her grandson, who is an orthopedic resident in [place]  She denies left knee pain  She reports she has difficulty with the left knee T scope brace   Cannot reach over far enough to secure it or release it distally  She is advancing her knee flexion, however there is still some stiffness there  Is able to weight-bear on it and use a walker  No incisional problems  Denies numbness and tingling      6/29/2021  Follow-up left periprosthetic patella fracture  she is approximately 2 months postop now  She denies any pain  She is using the knee brace locked in full extension  No wound issues, no drainage  Denies numbness and tingling      6/1/2021  No issues      5/14/2021  Postop office check  No major issues  No pain currently  Denies N/T      5/2/2021  OPERATION PERFORMED:  Open treatment of the left periprosthetic patellar fracture with internal fixation using nonabsorbable braided sutures.       Past Medical History:   Diagnosis Date    Arthritis     Diabetes mellitus ([address]     HYPERCHOLESTERAEMIA     Hypertension     TIA (transient ischaemic attack) 2003       Past Surgical History:   Procedure Laterality Date    COLECTOMY      JOINT REPLACEMENT  04/22/2010    left knee    PATELLA FRACTURE SURGERY Left 5/2/2021    PATELLA OPEN REDUCTION INTERNAL FIXATION performed by Luis Collazo MD at [address] [place] [address]  8/19/2010    right, cemented       Social History     Socioeconomic History    Marital status:      Spouse name: Not on file    Number of children: Not on file    Years of education: Not on file    Highest education level: Not on file   Occupational History    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[employer]    Tobacco Use    Smoking status: Never Smoker    Smokeless tobacco: Throat 30 lozenge     potassium bicarbonate (K-LYTE) 25 MEQ disintegrating tablet Take 1 tablet by mouth 2 times daily 60 tablet 3    Carboxymethylcellul-Glycerin (REFRESH OPTIVE) 0.5-0.9 % SOLN Apply 1 drop to eye nightly Both eyes nightly      Biotin 1000 MCG TABS Take 1 tablet by mouth daily      vitamin B-12 (CYANOCOBALAMIN) 1000 MCG tablet Take 1,000 mcg by mouth daily      pioglitazone-metFORMIN (ACTOPLUS MET)  MG per tablet Take 1 tablet by mouth daily 90 tablet 0    olmesartan-amLODIPine-HCTZ 40-10-25 MG TABS Take 1 tablet by mouth daily 90 tablet 1    doxepin (SINEQUAN) 50 MG capsule Take 1 capsule by mouth nightly 90 capsule 1    omeprazole (PRILOSEC) 40 MG delayed release capsule Take 1 capsule by mouth daily 90 capsule 1    montelukast (SINGULAIR) 10 MG tablet Take 1 tablet by mouth daily 30 tablet 3    blood glucose monitor kit and supplies Test 2 times a day & as needed for symptoms of irregular blood glucose. 1 kit 0    blood glucose monitor strips Test 1 times a day & as needed for symptoms of irregular blood glucose. 50 strip 0    Lancets MISC 1 each by Does not apply route 2 times daily 100 each 5    Alcohol Swabs PADS 1 each by Does not apply route daily 100 each 2    Cholecalciferol (VITAMIN D3) 2000 units CAPS Take 1 capsule by mouth daily 90 capsule 0    blood glucose test strips (ASCENSIA AUTODISC VI;ONE TOUCH ULTRA TEST VI) strip 1 each by In Vitro route daily As needed. 100 each 3    calcium carbonate-vitamin D (CALCIUM 600 + D) 600-400 MG-UNIT TABS per tab Take 1 tablet by mouth daily (Patient taking differently: Take 1 tablet by mouth 2 times daily ) 90 tablet 0    Denture Care Products (POLIDENT 3 MINUTE) TBEF 1 tablet by Does not apply route daily 30 tablet 5    Multiple Vitamin (MULTIVITAMIN PO) Take 1 tablet by mouth daily.       aspirin 81 MG EC tablet Take 1 tablet by mouth 2 times daily 60 tablet 0    fluticasone (FLONASE) 50 MCG/ACT nasal spray 1 spray by Each Nostril route daily 2 Bottle 5    atorvastatin (LIPITOR) 10 MG tablet Take 1 tablet by mouth daily 90 tablet 3     No current facility-administered medications for this visit. Vitals:    07/13/21 1403   Resp: 16   Weight: 113 lb (51.3 kg)   Height: 4' 10\" (1.473 m)       Physical Exam:  Body mass index is 23.62 kg/m². LLE - SILT SP/DP/T/sural/saphenous nerve distributions; EHL/FHL/TA/GS intact   DP pulse intact, RRR  Left knee - midline surgical incision completely healed   No erythema or drainage   She is able to demonstrate SLR against gravity with no extensor lag    No TTP patella   Mobility/crepitance at the fracture site palpated   ROM 0 - 70      Imaging:  None today   Left knee 2 views performed previously - s/p cemented total knee arthroplasty with patellar resurfacing. Implants are in stable position. Comminuted patella fracture is again seen with interval displacement/distraction. No bony callus is seen. Assessment & Plan:  80 y.o. female following up for   Diagnosis Orders   1. Periprosthetic fracture of patella, subsequent encounter         No orders of the defined types were placed in this encounter.       The medial and lateral retinacular tears appear to have healed, which is allowing her to perform active knee extension without extensor lag  Therefore I do not recommend any further surgical intervention at this time  She also has no pain at the fracture site  Periprosthetic patella fractures with high complication rates, high risk for wound complications       T-scope set from 0-80 today   Advance flexion by 10 degrees weekly in the brace  When she is at rest and sleeping, she does not need to have the knee brace on  LLE WBAT with knee brace locked in full extension when weightbearing and ambulating  When she has good quad control/strength, she can start advancing her ambulation with the knee brace unlocked (defer to physical therapist for timing of this)  Once she is able to ambulate with the knee brace unlocked and her mechanics are good, she can then discontinue the brace all together (again will defer to physical therapist for timing of this)    We can potentially try a short runner brace as well which may be easier for her to put on and off, however, does provide less support, and by the time that gets delivered, she may already be done with the knee brace  Her family thinks she should be okay without it  If she requires it in the future, we will be happy to order it for her    Follow-up in 3 months for reassessment, sooner if any issues arise    Sachi Lopez MD

## 2021-07-13 NOTE — TELEPHONE ENCOUNTER
General Question     Subject: HOW LONG DOES SHE NEED TO WEAR THE KNEE BRACE  Patient and /or Facility Request: Angel Nichole  Contact Number: [phone]

## 2021-07-14 NOTE — TELEPHONE ENCOUNTER
Per Dr Nixon Sorto,    Once she is able to ambulate with the knee brace unlocked and her mechanics are good, she can then discontinue the brace all together (again will defer to physical therapist for timing of this). I spoke to pt and let her know.

## 2021-07-30 RX ORDER — PREDNISONE 20 MG/1
20 TABLET ORAL 2 TIMES DAILY
Qty: 10 TABLET | Refills: 0 | Status: SHIPPED | OUTPATIENT
Start: 2021-07-30 | End: 2021-08-09

## 2021-07-30 RX ORDER — HYDROXYZINE HYDROCHLORIDE 25 MG/1
25 TABLET, FILM COATED ORAL EVERY 8 HOURS PRN
Qty: 30 TABLET | Refills: 0 | Status: SHIPPED | OUTPATIENT
Start: 2021-07-30 | End: 2021-08-09

## 2021-07-30 RX ORDER — DIAPER,BRIEF,INFANT-TODD,DISP
EACH MISCELLANEOUS
Qty: 1 TUBE | Refills: 2 | Status: SHIPPED | OUTPATIENT
Start: 2021-07-30 | End: 2021-08-06

## 2021-08-05 ENCOUNTER — TELEPHONE (OUTPATIENT)
Dept: ORTHOPEDIC SURGERY | Age: 86
End: 2021-08-05

## 2021-08-05 NOTE — TELEPHONE ENCOUNTER
I spoke to Fabrice Cook and let her know the following, per Dr Ailyn Araujo, on 7/13/21:     T-scope set from 0-80 today. Advance flexion by 10 degrees weekly in the brace  When she is at rest and sleeping, she does not need to have the knee brace on  LLE WBAT with knee brace locked in full extension when weightbearing and ambulating  When she has good quad control/strength, she can start advancing her ambulation with the knee brace unlocked (defer to physical therapist for timing of this)  Once she is able to ambulate with the knee brace unlocked and her mechanics are good, she can then discontinue the brace all together (again will defer to physical therapist for timing of this).     Fabrice Cook understood.

## 2021-08-07 DIAGNOSIS — E11.9 CONTROLLED TYPE 2 DIABETES MELLITUS WITHOUT COMPLICATION, WITHOUT LONG-TERM CURRENT USE OF INSULIN (HCC): Primary | ICD-10-CM

## 2021-08-09 ENCOUNTER — HOSPITAL ENCOUNTER (OUTPATIENT)
Age: 86
Discharge: HOME | End: 2021-08-09
Payer: MEDICARE

## 2021-08-09 DIAGNOSIS — E11.9 CONTROLLED TYPE 2 DIABETES MELLITUS WITHOUT COMPLICATION, WITHOUT LONG-TERM CURRENT USE OF INSULIN (HCC): Primary | ICD-10-CM

## 2021-08-09 DIAGNOSIS — E11.9 CONTROLLED TYPE 2 DIABETES MELLITUS WITHOUT COMPLICATION, WITHOUT LONG-TERM CURRENT USE OF INSULIN (HCC): ICD-10-CM

## 2021-08-09 LAB
HCT VFR BLD CALC: 37.6 % (ref 36–48)
HEMOGLOBIN: 12.6 G/DL (ref 12–16)
MCH RBC QN AUTO: 28.8 PG (ref 26–34)
MCHC RBC AUTO-ENTMCNC: 33.5 G/DL (ref 31–36)
MCV RBC AUTO: 85.9 FL (ref 80–100)
PDW BLD-RTO: 14.8 % (ref 12.4–15.4)
PLATELET # BLD: 292 K/UL (ref 135–450)
PMV BLD AUTO: 8.2 FL (ref 5–10.5)
RBC # BLD: 4.38 M/UL (ref 4–5.2)
WBC # BLD: 7.3 K/UL (ref 4–11)

## 2021-08-09 PROCEDURE — 36415 COLL VENOUS BLD VENIPUNCTURE: CPT

## 2021-08-09 PROCEDURE — 83036 HEMOGLOBIN GLYCOSYLATED A1C: CPT

## 2021-08-09 PROCEDURE — 85027 COMPLETE CBC AUTOMATED: CPT

## 2021-08-10 ENCOUNTER — HOSPITAL ENCOUNTER (OUTPATIENT)
Dept: GENERAL RADIOLOGY | Age: 86
Discharge: HOME | End: 2021-08-10
Payer: MEDICARE

## 2021-08-10 ENCOUNTER — HOSPITAL ENCOUNTER (OUTPATIENT)
Age: 86
Discharge: HOME | End: 2021-08-10
Payer: MEDICARE

## 2021-08-10 DIAGNOSIS — R60.0 BILATERAL LEG EDEMA: ICD-10-CM

## 2021-08-10 DIAGNOSIS — R05.3 PERSISTENT COUGH: ICD-10-CM

## 2021-08-10 LAB
A/G RATIO: 1.4 (ref 1.1–2.2)
ALBUMIN SERPL-MCNC: 4.2 G/DL (ref 3.4–5)
ALP BLD-CCNC: 76 U/L (ref 40–129)
ALT SERPL-CCNC: 15 U/L (ref 10–40)
ANION GAP SERPL CALCULATED.3IONS-SCNC: 11 MMOL/L (ref 3–16)
AST SERPL-CCNC: 17 U/L (ref 15–37)
BILIRUB SERPL-MCNC: <0.2 MG/DL (ref 0–1)
BILIRUBIN URINE: NEGATIVE
BLOOD, URINE: NEGATIVE
BUN BLDV-MCNC: 15 MG/DL (ref 7–20)
CALCIUM SERPL-MCNC: 9.3 MG/DL (ref 8.3–10.6)
CHLORIDE BLD-SCNC: 98 MMOL/L (ref 99–110)
CLARITY: CLEAR
CO2: 26 MMOL/L (ref 21–32)
COLOR: YELLOW
CREAT SERPL-MCNC: <0.5 MG/DL (ref 0.6–1.2)
EPITHELIAL CELLS, UA: 1 /HPF (ref 0–5)
ESTIMATED AVERAGE GLUCOSE: 148.5 MG/DL
GFR AFRICAN AMERICAN: >60
GFR NON-AFRICAN AMERICAN: >60
GLOBULIN: 2.9 G/DL
GLUCOSE BLD-MCNC: 163 MG/DL (ref 70–99)
GLUCOSE URINE: NEGATIVE MG/DL
HBA1C MFR BLD: 6.8 %
HYALINE CASTS: 0 /LPF (ref 0–8)
KETONES, URINE: NEGATIVE MG/DL
LEUKOCYTE ESTERASE, URINE: NEGATIVE
MICROSCOPIC EXAMINATION: NORMAL
NITRITE, URINE: NEGATIVE
PH UA: 6.5 (ref 5–8)
POTASSIUM SERPL-SCNC: 4.1 MMOL/L (ref 3.5–5.1)
PROTEIN UA: NEGATIVE MG/DL
RBC UA: 2 /HPF (ref 0–4)
SODIUM BLD-SCNC: 135 MMOL/L (ref 136–145)
SPECIFIC GRAVITY UA: 1.01 (ref 1–1.03)
TOTAL PROTEIN: 7.1 G/DL (ref 6.4–8.2)
URINE TYPE: NORMAL
UROBILINOGEN, URINE: 0.2 E.U./DL
WBC UA: 1 /HPF (ref 0–5)

## 2021-08-10 PROCEDURE — 71046 X-RAY EXAM CHEST 2 VIEWS: CPT

## 2021-08-10 PROCEDURE — 80053 COMPREHEN METABOLIC PANEL: CPT

## 2021-08-10 PROCEDURE — 81001 URINALYSIS AUTO W/SCOPE: CPT

## 2021-08-15 RX ORDER — MAGNESIUM OXIDE 400 MG/1
400 TABLET ORAL DAILY
Qty: 90 TABLET | Refills: 1 | Status: SHIPPED | OUTPATIENT
Start: 2021-08-15 | End: 2021-11-13

## 2021-08-15 RX ORDER — HYDROCHLOROTHIAZIDE 25 MG/1
25 TABLET ORAL EVERY MORNING
Qty: 90 TABLET | Refills: 1 | Status: SHIPPED | OUTPATIENT
Start: 2021-08-15 | End: 2021-09-28

## 2021-08-17 DIAGNOSIS — M62.81 QUADRICEPS WEAKNESS: Primary | ICD-10-CM

## 2021-09-28 ENCOUNTER — OFFICE VISIT (OUTPATIENT)
Dept: ORTHOPEDIC SURGERY | Age: 86
End: 2021-09-28
Payer: MEDICARE

## 2021-09-28 VITALS — HEIGHT: 58 IN | WEIGHT: 113 LBS | BODY MASS INDEX: 23.72 KG/M2 | RESPIRATION RATE: 16 BRPM

## 2021-09-28 DIAGNOSIS — M97.8XXD PERIPROSTHETIC FRACTURE OF PATELLA, SUBSEQUENT ENCOUNTER: Primary | ICD-10-CM

## 2021-09-28 DIAGNOSIS — Z96.659 PERIPROSTHETIC FRACTURE OF PATELLA, SUBSEQUENT ENCOUNTER: Primary | ICD-10-CM

## 2021-09-28 PROCEDURE — G8427 DOCREV CUR MEDS BY ELIG CLIN: HCPCS | Performed by: ORTHOPAEDIC SURGERY

## 2021-09-28 PROCEDURE — 1123F ACP DISCUSS/DSCN MKR DOCD: CPT | Performed by: ORTHOPAEDIC SURGERY

## 2021-09-28 PROCEDURE — 1036F TOBACCO NON-USER: CPT | Performed by: ORTHOPAEDIC SURGERY

## 2021-09-28 PROCEDURE — 99213 OFFICE O/P EST LOW 20 MIN: CPT | Performed by: ORTHOPAEDIC SURGERY

## 2021-09-28 PROCEDURE — G8420 CALC BMI NORM PARAMETERS: HCPCS | Performed by: ORTHOPAEDIC SURGERY

## 2021-09-28 PROCEDURE — G8400 PT W/DXA NO RESULTS DOC: HCPCS | Performed by: ORTHOPAEDIC SURGERY

## 2021-09-28 PROCEDURE — 4040F PNEUMOC VAC/ADMIN/RCVD: CPT | Performed by: ORTHOPAEDIC SURGERY

## 2021-09-28 PROCEDURE — 1090F PRES/ABSN URINE INCON ASSESS: CPT | Performed by: ORTHOPAEDIC SURGERY

## 2021-09-28 NOTE — PROGRESS NOTES
ORTHOPAEDIC PROGRESS NOTE    Chief Complaint   Patient presents with    Follow-up     left patella ORIF 5/2/2021       HPI   9/28/21  The patient returns to clinic today with her daughter  She is just about 5 months postop  She reports her left knee is doing well  She is not using/requiring her knee brace  She has no pain, rated 0 out of 10  No incisional issues  She is able to ambulate with her walker, and around the house does not need it at all      7/13/21  Patient returns to clinic today again for left knee  She is here with her grandson, who is an orthopedic resident in [place]  She denies left knee pain  She reports she has difficulty with the left knee T scope brace   Cannot reach over far enough to secure it or release it distally  She is advancing her knee flexion, however there is still some stiffness there  Is able to weight-bear on it and use a walker  No incisional problems  Denies numbness and tingling      6/29/2021  Follow-up left periprosthetic patella fracture  she is approximately 2 months postop now  She denies any pain  She is using the knee brace locked in full extension  No wound issues, no drainage  Denies numbness and tingling      6/1/2021  No issues      5/14/2021  Postop office check  No major issues  No pain currently  Denies N/T      5/2/2021  OPERATION PERFORMED:  Open treatment of the left periprosthetic patellar fracture with internal fixation using nonabsorbable braided sutures.       Past Medical History:   Diagnosis Date    Arthritis     Diabetes mellitus ([address]     HYPERCHOLESTERAEMIA     Hypertension     TIA (transient ischaemic attack) 2003       Past Surgical History:   Procedure Laterality Date    COLECTOMY      JOINT REPLACEMENT  04/22/2010    left knee    PATELLA FRACTURE SURGERY Left 5/2/2021    PATELLA OPEN REDUCTION INTERNAL FIXATION performed by Ida Cote MD at [address] [place] [address]  8/19/2010    right, cemented       Social History Socioeconomic History    Marital status:      Spouse name: Not on file    Number of children: Not on file    Years of education: Not on file    Highest education level: Not on file   Occupational History    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[employer] Smoking status: Never Smoker    Smokeless tobacco: Never Used   Substance and Sexual Activity    Alcohol use: No    Drug use: No    Sexual activity: Never   Other Topics Concern    Not on file   Social History Narrative     ,  4 children grown , 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[facility], worked as a school Volunteer      Social Determinants of Health     Financial Resource Strain:     Difficulty of Paying Living Expenses:    Food Insecurity:     Worried About [address] [employer] [employer] in the Last Year:    [address] [facility] in the Last Year:    Transportation Needs:     Lack of Transportation (Medical):      Lack of Transportation (Non-Medical):    Physical Activity:     Days of Exercise per Week:     Minutes of Exercise per Session:    Stress:     Feeling of Stress :    Social Connections:     Frequency of Communication with Friends and Family:     Frequency of Social Gatherings with Friends and Family:     Attends L[employer] Services:     Active Member of Clubs or Organizations:     Attends Club or Organization Meetings:     Marital Status:    Intimate Partner Violence:     Fear of Current or Ex-Partner:     Emotionally Abused:     Physically Abused:     Sexually Abused:        Current Outpatient Medications   Medication Sig Dispense Refill    hydroCHLOROthiazide (HYDRODIURIL) 25 MG tablet Take 1 tablet by mouth every morning 90 tablet 1    magnesium oxide (MAG-OX) 400 MG tablet Take 1 tablet by mouth daily 90 tablet 1    metFORMIN (GLUCOPHAGE) 1000 MG tablet Take 1 tablet by mouth 2 times daily (with meals) 180 tablet 1    SITagliptin (JANUVIA) 100 MG tablet Take 1 tablet by mouth daily 90 tablet 1    budesonide-formoterol (SYMBICORT) 160-4.5 MCG/ACT AERO Inhale 2 puffs into the lungs 2 times daily 3 Inhaler 1    albuterol sulfate HFA (VENTOLIN HFA) 108 (90 Base) MCG/ACT inhaler Inhale 2 puffs into the lungs 4 times daily as needed for Wheezing 3 Inhaler 1    HYDROcodone-acetaminophen (NORCO) 5-325 MG per tablet Take 2 tablets by mouth every 6 hours as needed for Pain.  glucose (GLUTOSE) 40 % GEL Take 37.5 mLs by mouth as needed (low Blood sugar) 45 g 1    sodium chloride (OCEAN, BABY AYR) 0.65 % nasal spray 1 spray by Nasal route every 4 hours as needed for Congestion  0    benzocaine-menthol (CEPACOL SORE THROAT) 15-3.6 MG lozenge Take 1 lozenge by mouth every 2 hours as needed for Sore Throat 30 lozenge     potassium bicarbonate (K-LYTE) 25 MEQ disintegrating tablet Take 1 tablet by mouth 2 times daily 60 tablet 3    aspirin 81 MG EC tablet Take 1 tablet by mouth 2 times daily 60 tablet 0    Carboxymethylcellul-Glycerin (REFRESH OPTIVE) 0.5-0.9 % SOLN Apply 1 drop to eye nightly Both eyes nightly      Biotin 1000 MCG TABS Take 1 tablet by mouth daily      vitamin B-12 (CYANOCOBALAMIN) 1000 MCG tablet Take 1,000 mcg by mouth daily      pioglitazone-metFORMIN (ACTOPLUS MET)  MG per tablet Take 1 tablet by mouth daily 90 tablet 0    olmesartan-amLODIPine-HCTZ 40-10-25 MG TABS Take 1 tablet by mouth daily 90 tablet 1    doxepin (SINEQUAN) 50 MG capsule Take 1 capsule by mouth nightly 90 capsule 1    omeprazole (PRILOSEC) 40 MG delayed release capsule Take 1 capsule by mouth daily 90 capsule 1    fluticasone (FLONASE) 50 MCG/ACT nasal spray 1 spray by Each Nostril route daily 2 Bottle 5    atorvastatin (LIPITOR) 10 MG tablet Take 1 tablet by mouth daily 90 tablet 3    montelukast (SINGULAIR) 10 MG tablet Take 1 tablet by mouth daily 30 tablet 3    blood glucose monitor kit and supplies Test 2 times a day & as needed for symptoms of irregular blood glucose.  1 kit 0    blood glucose monitor strips Test 1 times a day & as needed for symptoms of irregular blood glucose. 50 strip 0    Lancets MISC 1 each by Does not apply route 2 times daily 100 each 5    Alcohol Swabs PADS 1 each by Does not apply route daily 100 each 2    Cholecalciferol (VITAMIN D3) 2000 units CAPS Take 1 capsule by mouth daily 90 capsule 0    blood glucose test strips (ASCENSIA AUTODISC VI;ONE TOUCH ULTRA TEST VI) strip 1 each by In Vitro route daily As needed. 100 each 3    calcium carbonate-vitamin D (CALCIUM 600 + D) 600-400 MG-UNIT TABS per tab Take 1 tablet by mouth daily (Patient taking differently: Take 1 tablet by mouth 2 times daily ) 90 tablet 0    Denture Care Products (POLIDENT 3 MINUTE) TBEF 1 tablet by Does not apply route daily 30 tablet 5    Multiple Vitamin (MULTIVITAMIN PO) Take 1 tablet by mouth daily. No current facility-administered medications for this visit. Vitals:    09/28/21 1328   Resp: 16   Weight: 113 lb (51.3 kg)   Height: 4' 10\" (1.473 m)       Physical Exam:  Body mass index is 23.62 kg/m². LLE - SILT SP/DP/T/sural/saphenous nerve distributions; EHL/FHL/TA/GS intact   DP pulse intact, RRR  Left knee - midline surgical incision completely healed   No erythema or drainage   She is able to demonstrate SLR against gravity with approximately 5 degree extensor lag   No TTP patella   Mobility/crepitance at the fracture site palpated   ROM 0 - 95  No tenderness to palpation of the calf, negative Garrick test      Imaging:  None today   Left knee 2 views performed previously - s/p cemented total knee arthroplasty with patellar resurfacing. Implants are in stable position. Comminuted patella fracture is again seen with interval displacement/distraction. No bony callus is seen. Assessment & Plan:  80 y.o. female following up for   Diagnosis Orders   1.  Periprosthetic fracture of patella, subsequent encounter         No orders of the defined types were placed in this encounter.       Periprosthetic patellar fracture nonunion  However, extensor mechanism is intact as demonstrated on her serial exams    She is doing well  No pain  Ambulating well    Activities as tolerated, recommend walker use when ambulating    Follow-up with any issues      Su Hyman MD

## 2021-10-02 RX ORDER — BROMPHENIRAMINE MALEATE, PSEUDOEPHEDRINE HYDROCHLORIDE, AND DEXTROMETHORPHAN HYDROBROMIDE 2; 30; 10 MG/5ML; MG/5ML; MG/5ML
5 SYRUP ORAL 4 TIMES DAILY PRN
Qty: 300 ML | Refills: 2 | Status: SHIPPED | OUTPATIENT
Start: 2021-10-02 | End: 2021-10-17

## 2021-10-02 RX ORDER — PREDNISONE 20 MG/1
20 TABLET ORAL 2 TIMES DAILY
Qty: 10 TABLET | Refills: 1 | Status: SHIPPED | OUTPATIENT
Start: 2021-10-02 | End: 2021-10-07

## 2021-10-31 RX ORDER — CARVEDILOL 25 MG/1
25 TABLET ORAL 2 TIMES DAILY
Qty: 60 TABLET | Refills: 0 | Status: SHIPPED | OUTPATIENT
Start: 2021-10-31 | End: 2021-11-25 | Stop reason: SDUPTHER

## 2021-10-31 RX ORDER — TRIAMTERENE AND HYDROCHLOROTHIAZIDE 37.5; 25 MG/1; MG/1
1 TABLET ORAL DAILY
Qty: 30 TABLET | Refills: 0 | Status: SHIPPED | OUTPATIENT
Start: 2021-10-31 | End: 2021-11-25 | Stop reason: SDUPTHER

## 2021-11-02 RX ORDER — HYDROXYZINE HYDROCHLORIDE 25 MG/1
25 TABLET, FILM COATED ORAL NIGHTLY
Qty: 30 TABLET | Refills: 1 | Status: SHIPPED | OUTPATIENT
Start: 2021-11-02 | End: 2021-12-06 | Stop reason: SDUPTHER

## 2021-11-25 RX ORDER — CARVEDILOL 25 MG/1
25 TABLET ORAL 2 TIMES DAILY
Qty: 180 TABLET | Refills: 0 | Status: SHIPPED | OUTPATIENT
Start: 2021-11-25 | End: 2021-12-06 | Stop reason: SDUPTHER

## 2021-11-25 RX ORDER — TRIAMTERENE AND HYDROCHLOROTHIAZIDE 37.5; 25 MG/1; MG/1
1 TABLET ORAL DAILY
Qty: 90 TABLET | Refills: 0 | Status: SHIPPED | OUTPATIENT
Start: 2021-11-25 | End: 2021-12-13 | Stop reason: SDUPTHER

## 2021-11-25 RX ORDER — CLOBETASOL PROPIONATE 0.5 MG/G
2 CREAM TOPICAL DAILY
Qty: 60 G | Refills: 2 | Status: SHIPPED | OUTPATIENT
Start: 2021-11-25 | End: 2022-10-28 | Stop reason: SDUPTHER

## 2021-11-26 RX ORDER — ALBUTEROL SULFATE 90 UG/1
2 AEROSOL, METERED RESPIRATORY (INHALATION) 4 TIMES DAILY PRN
Qty: 3 EACH | Refills: 3 | Status: SHIPPED | OUTPATIENT
Start: 2021-11-26 | End: 2022-02-24 | Stop reason: SDUPTHER

## 2021-11-26 RX ORDER — AMOXICILLIN 500 MG/1
500 CAPSULE ORAL 3 TIMES DAILY
Qty: 30 CAPSULE | Refills: 0 | Status: SHIPPED | OUTPATIENT
Start: 2021-11-26 | End: 2021-12-06

## 2021-12-06 RX ORDER — HYDROXYZINE HYDROCHLORIDE 25 MG/1
25 TABLET, FILM COATED ORAL NIGHTLY
Qty: 30 TABLET | Refills: 1 | Status: SHIPPED | OUTPATIENT
Start: 2021-12-06 | End: 2022-01-03 | Stop reason: SDUPTHER

## 2021-12-06 RX ORDER — CARVEDILOL 25 MG/1
25 TABLET ORAL 2 TIMES DAILY
Qty: 180 TABLET | Refills: 0 | Status: SHIPPED | OUTPATIENT
Start: 2021-12-06 | End: 2022-02-24 | Stop reason: SDUPTHER

## 2021-12-10 ENCOUNTER — OFFICE VISIT (OUTPATIENT)
Dept: PULMONOLOGY | Age: 86
End: 2021-12-10
Payer: MEDICARE

## 2021-12-10 VITALS
WEIGHT: 111 LBS | SYSTOLIC BLOOD PRESSURE: 138 MMHG | HEIGHT: 58 IN | BODY MASS INDEX: 23.3 KG/M2 | HEART RATE: 87 BPM | OXYGEN SATURATION: 97 % | DIASTOLIC BLOOD PRESSURE: 80 MMHG

## 2021-12-10 DIAGNOSIS — R06.09 DOE (DYSPNEA ON EXERTION): ICD-10-CM

## 2021-12-10 DIAGNOSIS — J45.40 MODERATE PERSISTENT ASTHMA WITHOUT COMPLICATION: Primary | ICD-10-CM

## 2021-12-10 PROCEDURE — G8420 CALC BMI NORM PARAMETERS: HCPCS | Performed by: INTERNAL MEDICINE

## 2021-12-10 PROCEDURE — 99204 OFFICE O/P NEW MOD 45 MIN: CPT | Performed by: INTERNAL MEDICINE

## 2021-12-10 PROCEDURE — G8427 DOCREV CUR MEDS BY ELIG CLIN: HCPCS | Performed by: INTERNAL MEDICINE

## 2021-12-10 PROCEDURE — 1090F PRES/ABSN URINE INCON ASSESS: CPT | Performed by: INTERNAL MEDICINE

## 2021-12-10 PROCEDURE — G8482 FLU IMMUNIZE ORDER/ADMIN: HCPCS | Performed by: INTERNAL MEDICINE

## 2021-12-10 RX ORDER — MONTELUKAST SODIUM 10 MG/1
10 TABLET ORAL DAILY
Qty: 30 TABLET | Refills: 3 | Status: SHIPPED | OUTPATIENT
Start: 2021-12-10 | End: 2022-01-03 | Stop reason: SDUPTHER

## 2021-12-10 ASSESSMENT — ENCOUNTER SYMPTOMS
SORE THROAT: 0
WHEEZING: 1
ABDOMINAL DISTENTION: 0
RHINORRHEA: 0
CHOKING: 0
BLOOD IN STOOL: 0
APNEA: 0
VOICE CHANGE: 0
ANAL BLEEDING: 0
ABDOMINAL PAIN: 0
CONSTIPATION: 0
BACK PAIN: 0
SHORTNESS OF BREATH: 1
DIARRHEA: 0
SINUS PRESSURE: 0
STRIDOR: 0
CHEST TIGHTNESS: 1
COUGH: 1

## 2021-12-10 NOTE — PROGRESS NOTES
 clobetasol prop emollient base 0.05 % CREA Apply 0.1333 each topically daily 60 g 2    metFORMIN (GLUCOPHAGE) 1000 MG tablet Take 1 tablet by mouth 2 times daily (with meals) 180 tablet 1    Cholecalciferol (VITAMIN D3) 50 MCG (2000 UT) CAPS Take 1 capsule by mouth daily 90 capsule 3    SITagliptin (JANUVIA) 100 MG tablet Take 1 tablet by mouth daily 90 tablet 1    budesonide-formoterol (SYMBICORT) 160-4.5 MCG/ACT AERO Inhale 2 puffs into the lungs 2 times daily 3 Inhaler 1    glucose (GLUTOSE) 40 % GEL Take 37.5 mLs by mouth as needed (low Blood sugar) 45 g 1    sodium chloride (OCEAN, BABY AYR) 0.65 % nasal spray 1 spray by Nasal route every 4 hours as needed for Congestion  0    benzocaine-menthol (CEPACOL SORE THROAT) 15-3.6 MG lozenge Take 1 lozenge by mouth every 2 hours as needed for Sore Throat 30 lozenge     Carboxymethylcellul-Glycerin (REFRESH OPTIVE) 0.5-0.9 % SOLN Apply 1 drop to eye nightly Both eyes nightly      Biotin 1000 MCG TABS Take 1 tablet by mouth daily      vitamin B-12 (CYANOCOBALAMIN) 1000 MCG tablet Take 1,000 mcg by mouth daily      doxepin (SINEQUAN) 50 MG capsule Take 1 capsule by mouth nightly 90 capsule 1    blood glucose monitor kit and supplies Test 2 times a day & as needed for symptoms of irregular blood glucose. 1 kit 0    blood glucose monitor strips Test 1 times a day & as needed for symptoms of irregular blood glucose. 50 strip 0    Lancets MISC 1 each by Does not apply route 2 times daily 100 each 5    Alcohol Swabs PADS 1 each by Does not apply route daily 100 each 2    blood glucose test strips (ASCENSIA AUTODISC VI;ONE TOUCH ULTRA TEST VI) strip 1 each by In Vitro route daily As needed.  100 each 3    calcium carbonate-vitamin D (CALCIUM 600 + D) 600-400 MG-UNIT TABS per tab Take 1 tablet by mouth daily (Patient taking differently: Take 1 tablet by mouth 2 times daily ) 90 tablet 0    Denture Care Products (POLIDENT 3 MINUTE) TBEF 1 tablet by Does not apply route daily 30 tablet 5    Multiple Vitamin (MULTIVITAMIN PO) Take 1 tablet by mouth daily. Immunization History   Administered Date(s) Administered    COVID-19, Moderna, Primary or Immunocompromised, PF, 100mcg/0.5mL 02/02/2021, 02/28/2021    Influenza Virus Vaccine 09/14/2017, 10/01/2018, 09/12/2019, 09/24/2020    Influenza Whole 11/01/2009    Influenza, MDCK Quadv, with preserv IM (Flucelvax 2 yrs and older) 09/14/2017, 10/01/2018, 09/24/2020, 09/28/2021    Influenza, Maritza Li, IM, (6 mo and older Fluzone, Flulaval, Fluarix and 3 yrs and older Afluria) 09/12/2019    Pneumococcal Conjugate 7-valent (Prevnar7) 11/01/2008, 11/01/2008    Pneumococcal Polysaccharide ([id number]) 12/05/2017    Zoster Live (Zostavax) 05/24/2016       Past Medical History:   Diagnosis Date    Arthritis     Diabetes mellitus ([address]     HYPERCHOLESTERAEMIA     Hypertension     TIA (transient ischaemic attack) 2003     Past Surgical History:   Procedure Laterality Date    COLECTOMY      JOINT REPLACEMENT  04/22/2010    left knee    PATELLA FRACTURE SURGERY Left 5/2/2021    PATELLA OPEN REDUCTION INTERNAL FIXATION performed by Aysha Hodges MD at [address] [place] [address]  8/19/2010    right, cemented     Family History   Problem Relation Age of Onset    Arthritis Mother        Review of Systems:  Review of Systems   Constitutional: Negative for activity change, appetite change, fatigue and fever. HENT: Positive for postnasal drip. Negative for congestion, ear discharge, ear pain, rhinorrhea, sinus pressure, sneezing, sore throat, tinnitus and voice change. Respiratory: Positive for cough, chest tightness, shortness of breath and wheezing. Negative for apnea, choking and stridor. Cardiovascular: Negative for chest pain, palpitations and leg swelling. Gastrointestinal: Negative for abdominal distention, abdominal pain, anal bleeding, blood in stool, constipation and diarrhea.    Musculoskeletal: Negative for arthralgias, back pain and gait problem. Skin: Negative for pallor and rash. Allergic/Immunologic: Negative for environmental allergies. Neurological: Negative for dizziness, tremors, seizures, syncope, speech difficulty, weakness, light-headedness, numbness and headaches. Hematological: Negative for adenopathy. Does not bruise/bleed easily. Psychiatric/Behavioral: Negative for sleep disturbance. Vitals:    12/10/21 1630   BP: 138/80   Pulse: 87   SpO2: 97%   Weight: 111 lb (50.3 kg)   Height: 4' 10\" (1.473 m)     No flowsheet data found. Body mass index is 23.2 kg/m². Wt Readings from Last 3 Encounters:   12/10/21 111 lb (50.3 kg)   09/28/21 113 lb (51.3 kg)   07/13/21 113 lb (51.3 kg)     BP Readings from Last 3 Encounters:   12/10/21 138/80   08/09/21 124/68   05/06/21 136/74         Physical Exam  Constitutional:       General: She is not in acute distress. Appearance: She is well-developed. She is not diaphoretic. HENT:      Mouth/Throat:      Pharynx: No oropharyngeal exudate. Cardiovascular:      Rate and Rhythm: Normal rate and regular rhythm. Heart sounds: Normal heart sounds. No murmur heard. Pulmonary:      Effort: No respiratory distress. Breath sounds: Normal breath sounds. No wheezing, rhonchi or rales. Chest:      Chest wall: No tenderness. Abdominal:      General: There is no distension. Palpations: There is no mass. Tenderness: There is no abdominal tenderness. There is no guarding or rebound. Musculoskeletal:         General: No swelling, tenderness or deformity. Skin:     Coloration: Skin is not pale. Findings: No erythema or rash. Neurological:      Mental Status: She is alert and oriented to person, place, and time. Cranial Nerves: No cranial nerve deficit. Motor: No abnormal muscle tone.       Coordination: Coordination normal.      Deep Tendon Reflexes: Reflexes normal.             Health Maintenance Topic Date Due    DTaP/Tdap/Td vaccine (1 - Tdap) Never done    DEXA (modify frequency per FRAX score)  Never done    Shingles Vaccine (2 of 3) 07/19/2016    COVID-19 Vaccine (3 - Booster for Moderna series) 08/28/2021    Annual Wellness Visit (AWV)  08/10/2022    Potassium monitoring  08/10/2022    Creatinine monitoring  08/10/2022    Flu vaccine  Completed    Pneumococcal 65+ years Vaccine  Completed    Hepatitis A vaccine  Aged Out    Hib vaccine  Aged Out    Meningococcal (ACWY) vaccine  Aged Out          Assessment/Plan: Moderate persistent asthma, poorly controlled symptoms with Symbicort. Will switch to breztri 2 puffs twice daily, samples were provided. Will also provide patient with spacer device to improve drug delivery. Start Singulair 10 mg at nighttime-prescription sent. Personally reviewed imaging including chest x-ray from 8/10 and CT chest from 4/22. Features of hyperinflation/mosaicism noted with some cystic changes. No significant lung nodules noted. 2D echocardiogram from 4/30 showed preserved EF-60 to 65%, normal RV function and mild pulmonary hypertension with RVSP of 32. PFT from September 2019 showed moderate obstructive airway disease with no significant bronchodilator reversibility, FEV1 of 0.75 L [58% predicted, % predicted, DLCO decreased at 59% predicted. Will hold off on repeat PFT study at this time. Return in about 3 months (around 3/10/2022).

## 2022-01-03 ENCOUNTER — TELEPHONE (OUTPATIENT)
Dept: ORTHOPEDIC SURGERY | Age: 87
End: 2022-01-03

## 2022-01-03 NOTE — TELEPHONE ENCOUNTER
Dear PCP Provider: [facility]) has partnered with [facility] to utilize predictive analytics to improve the care management for patients with arthritic knee pain. Utilizing claims data and patient visit features, we have developed an algorithmic risk score to determine which patient's quality of life may be most impacted to their knee pain. The selection criteria for this  program are: 1) risk score greater than .85; 2) existing [address] [place] [address] Floor patient; and 3) seen for knee pain in the past 3 years. Based upon the predictive analytics risk score  program, your patient has a risk score of greater than . 85 (i.e. 85% probability in having their quality of life impacted by their knee pain). To assist with care management of your patient, a navigator will be contacting them to understand their knee pain status and to educate on the [address] Pain Program, including scheduling an appointment with a joint specialist or their PCP. If you feel this patient not appropriate to be contacted given other medical reasons, please reply back to the navigator within 7 days with reason why not to be contacted. Otherwise, the navigator will follow up with you on the details of the patient encounter after the call.  Thank you for your support for this program.

## 2022-01-11 ENCOUNTER — TELEPHONE (OUTPATIENT)
Dept: ORTHOPEDIC SURGERY | Age: 87
End: 2022-01-11

## 2022-01-11 NOTE — TELEPHONE ENCOUNTER
LVM for patient regarding the [address] [place] [address] Orthopedic joint pain program. Patient can call [phone] for more information or to schedule an appointment with a joint pain specialist.

## 2022-02-18 RX ORDER — ATORVASTATIN CALCIUM 10 MG/1
10 TABLET, FILM COATED ORAL DAILY
Qty: 90 TABLET | Refills: 3 | Status: SHIPPED | OUTPATIENT
Start: 2022-02-18 | End: 2022-02-24 | Stop reason: SDUPTHER

## 2022-04-09 RX ORDER — MAGNESIUM OXIDE 400 MG/1
400 TABLET ORAL DAILY
Qty: 30 TABLET | Refills: 2 | Status: SHIPPED | OUTPATIENT
Start: 2022-04-09 | End: 2022-05-08 | Stop reason: SDUPTHER

## 2022-04-09 NOTE — PROGRESS NOTES
Patient c/o dry cough advised to use claritin , flonase and Tessalon , ct Bronchodilator Beta2 agonist and ICS

## 2022-04-12 RX ORDER — CALCIUM POLYCARBOPHIL 625 MG
625 TABLET ORAL DAILY
Qty: 30 TABLET | Refills: 5 | Status: SHIPPED | OUTPATIENT
Start: 2022-04-12 | End: 2022-05-12

## 2022-04-13 PROBLEM — R06.2 NOCTURNAL COUGH WITH WHEEZE: Status: ACTIVE | Noted: 2022-04-13

## 2022-04-13 PROBLEM — K58.0 IRRITABLE BOWEL SYNDROME WITH DIARRHEA: Status: ACTIVE | Noted: 2022-04-13

## 2022-04-13 PROBLEM — R05.8 NOCTURNAL COUGH WITH WHEEZE: Status: ACTIVE | Noted: 2022-04-13

## 2022-04-13 RX ORDER — HYDROXYZINE HYDROCHLORIDE 25 MG/1
25 TABLET, FILM COATED ORAL NIGHTLY
Qty: 90 TABLET | Refills: 1 | Status: SHIPPED | OUTPATIENT
Start: 2022-04-13 | End: 2022-07-12

## 2022-04-13 RX ORDER — DOXEPIN HYDROCHLORIDE 50 MG/1
50 CAPSULE ORAL NIGHTLY
Qty: 90 CAPSULE | Refills: 1 | Status: SHIPPED | OUTPATIENT
Start: 2022-04-13 | End: 2022-07-23 | Stop reason: SDUPTHER

## 2022-05-08 RX ORDER — MAGNESIUM OXIDE 400 MG/1
400 TABLET ORAL DAILY
Qty: 30 TABLET | Refills: 2 | Status: SHIPPED | OUTPATIENT
Start: 2022-05-08 | End: 2022-07-23 | Stop reason: SDUPTHER

## 2022-05-09 NOTE — PROGRESS NOTES
She has post prandial diarrhea with weight loss C/W pancreatic exocrine insufficiency , will try Creon

## 2022-05-17 RX ORDER — LOPERAMIDE HYDROCHLORIDE 2 MG/1
2 CAPSULE ORAL 4 TIMES DAILY PRN
Qty: 40 CAPSULE | Refills: 2 | Status: SHIPPED | OUTPATIENT
Start: 2022-05-17 | End: 2022-05-27

## 2022-05-21 RX ORDER — TRIAMTERENE AND HYDROCHLOROTHIAZIDE 37.5; 25 MG/1; MG/1
1 TABLET ORAL DAILY
Qty: 90 TABLET | Refills: 3 | Status: SHIPPED | OUTPATIENT
Start: 2022-05-21 | End: 2022-07-23 | Stop reason: SDUPTHER

## 2022-07-06 RX ORDER — MONTELUKAST SODIUM 10 MG/1
10 TABLET ORAL DAILY
Qty: 90 TABLET | Refills: 1 | Status: SHIPPED | OUTPATIENT
Start: 2022-07-06 | End: 2022-07-23 | Stop reason: SDUPTHER

## 2022-07-23 RX ORDER — ATORVASTATIN CALCIUM 10 MG/1
10 TABLET, FILM COATED ORAL DAILY
Qty: 90 TABLET | Refills: 1 | Status: SHIPPED | OUTPATIENT
Start: 2022-07-23 | End: 2022-09-13 | Stop reason: SDUPTHER

## 2022-07-23 RX ORDER — OMEPRAZOLE 40 MG/1
40 CAPSULE, DELAYED RELEASE ORAL DAILY
Qty: 90 CAPSULE | Refills: 3 | Status: SHIPPED | OUTPATIENT
Start: 2022-07-23 | End: 2022-10-21

## 2022-07-23 RX ORDER — MAGNESIUM OXIDE 400 MG/1
400 TABLET ORAL DAILY
Qty: 30 TABLET | Refills: 2 | Status: SHIPPED | OUTPATIENT
Start: 2022-07-23 | End: 2022-08-15 | Stop reason: SDUPTHER

## 2022-07-23 RX ORDER — MONTELUKAST SODIUM 10 MG/1
10 TABLET ORAL DAILY
Qty: 90 TABLET | Refills: 1 | Status: SHIPPED | OUTPATIENT
Start: 2022-07-23 | End: 2022-10-21

## 2022-07-23 RX ORDER — BUDESONIDE, GLYCOPYRROLATE, AND FORMOTEROL FUMARATE 160; 9; 4.8 UG/1; UG/1; UG/1
1 AEROSOL, METERED RESPIRATORY (INHALATION) 2 TIMES DAILY
Qty: 32 G | Refills: 1 | Status: SHIPPED | OUTPATIENT
Start: 2022-07-23 | End: 2022-09-13 | Stop reason: SDUPTHER

## 2022-07-23 RX ORDER — TRIAMTERENE AND HYDROCHLOROTHIAZIDE 37.5; 25 MG/1; MG/1
1 TABLET ORAL DAILY
Qty: 90 TABLET | Refills: 3 | Status: SHIPPED | OUTPATIENT
Start: 2022-07-23 | End: 2022-10-21

## 2022-07-23 RX ORDER — CARVEDILOL 25 MG/1
25 TABLET ORAL 2 TIMES DAILY
Qty: 180 TABLET | Refills: 0 | Status: SHIPPED | OUTPATIENT
Start: 2022-07-23 | End: 2022-09-13 | Stop reason: SDUPTHER

## 2022-07-23 RX ORDER — FLUTICASONE PROPIONATE 50 MCG
1 SPRAY, SUSPENSION (ML) NASAL DAILY
Qty: 3 EACH | Refills: 1 | Status: SHIPPED | OUTPATIENT
Start: 2022-07-23 | End: 2022-10-21

## 2022-07-23 RX ORDER — HYDROXYZINE HYDROCHLORIDE 25 MG/1
25 TABLET, FILM COATED ORAL NIGHTLY
Qty: 90 TABLET | Refills: 1 | Status: SHIPPED | OUTPATIENT
Start: 2022-07-23 | End: 2022-10-21

## 2022-07-23 RX ORDER — DOXEPIN HYDROCHLORIDE 50 MG/1
50 CAPSULE ORAL NIGHTLY
Qty: 90 CAPSULE | Refills: 1 | Status: SHIPPED | OUTPATIENT
Start: 2022-07-23 | End: 2022-09-13 | Stop reason: SDUPTHER

## 2022-09-13 DIAGNOSIS — Z00.00 MEDICARE ANNUAL WELLNESS VISIT, SUBSEQUENT: ICD-10-CM

## 2022-09-13 PROBLEM — K86.81 EXOCRINE PANCREATIC INSUFFICIENCY: Status: ACTIVE | Noted: 2022-09-13

## 2022-09-13 LAB
A/G RATIO: 2 (ref 1.1–2.2)
ALBUMIN SERPL-MCNC: 4.4 G/DL (ref 3.4–5)
ALP BLD-CCNC: 62 U/L (ref 40–129)
ALT SERPL-CCNC: 17 U/L (ref 10–40)
ANION GAP SERPL CALCULATED.3IONS-SCNC: 9 MMOL/L (ref 3–16)
AST SERPL-CCNC: 18 U/L (ref 15–37)
BILIRUB SERPL-MCNC: <0.2 MG/DL (ref 0–1)
BUN BLDV-MCNC: 13 MG/DL (ref 7–20)
CALCIUM SERPL-MCNC: 9.5 MG/DL (ref 8.3–10.6)
CHLORIDE BLD-SCNC: 90 MMOL/L (ref 99–110)
CHOLESTEROL, TOTAL: 119 MG/DL (ref 0–199)
CO2: 30 MMOL/L (ref 21–32)
CREAT SERPL-MCNC: 0.5 MG/DL (ref 0.6–1.2)
GFR AFRICAN AMERICAN: >60
GFR NON-AFRICAN AMERICAN: >60
GLUCOSE BLD-MCNC: 104 MG/DL (ref 70–99)
HCT VFR BLD CALC: 39.9 % (ref 36–48)
HDLC SERPL-MCNC: 47 MG/DL (ref 40–60)
HEMOGLOBIN: 13.5 G/DL (ref 12–16)
LDL CHOLESTEROL CALCULATED: 55 MG/DL
MCH RBC QN AUTO: 29.8 PG (ref 26–34)
MCHC RBC AUTO-ENTMCNC: 33.8 G/DL (ref 31–36)
MCV RBC AUTO: 87.9 FL (ref 80–100)
PDW BLD-RTO: 14.6 % (ref 12.4–15.4)
PLATELET # BLD: 249 K/UL (ref 135–450)
PMV BLD AUTO: 8.7 FL (ref 5–10.5)
POTASSIUM SERPL-SCNC: 4 MMOL/L (ref 3.5–5.1)
RBC # BLD: 4.54 M/UL (ref 4–5.2)
SODIUM BLD-SCNC: 129 MMOL/L (ref 136–145)
TOTAL PROTEIN: 6.6 G/DL (ref 6.4–8.2)
TRIGL SERPL-MCNC: 86 MG/DL (ref 0–150)
TSH SERPL DL<=0.05 MIU/L-ACNC: 4.98 UIU/ML (ref 0.27–4.2)
VLDLC SERPL CALC-MCNC: 17 MG/DL
WBC # BLD: 7.5 K/UL (ref 4–11)

## 2022-09-14 LAB
CREATININE URINE: 12.5 MG/DL (ref 28–259)
ESTIMATED AVERAGE GLUCOSE: 122.6 MG/DL
HBA1C MFR BLD: 5.9 %
MICROALBUMIN UR-MCNC: <1.2 MG/DL
MICROALBUMIN/CREAT UR-RTO: ABNORMAL MG/G (ref 0–30)

## 2022-10-26 RX ORDER — LIDOCAINE 50 MG/G
1 PATCH TOPICAL DAILY
Qty: 10 PATCH | Refills: 0 | Status: SHIPPED | OUTPATIENT
Start: 2022-10-26 | End: 2022-11-08 | Stop reason: SDUPTHER

## 2022-10-27 DIAGNOSIS — S20.212A CONTUSION OF LEFT CHEST WALL, INITIAL ENCOUNTER: ICD-10-CM

## 2022-10-27 DIAGNOSIS — S22.42XA CLOSED FRACTURE OF MULTIPLE RIBS OF LEFT SIDE, INITIAL ENCOUNTER: Primary | ICD-10-CM

## 2022-10-27 RX ORDER — TRAMADOL HYDROCHLORIDE 50 MG/1
50 TABLET ORAL EVERY 4 HOURS PRN
Qty: 42 TABLET | Refills: 0 | Status: SHIPPED | OUTPATIENT
Start: 2022-10-27 | End: 2022-11-03

## 2022-10-27 NOTE — PROGRESS NOTES
Patient son reported a fall while on visit to [place] has clinically fracture ribs on left and bruised up chest wall left , advised local cold pack , OTC pain meds , Tramadol called in

## 2022-10-28 RX ORDER — CLOBETASOL PROPIONATE 0.5 MG/G
2 CREAM TOPICAL DAILY
Qty: 60 G | Refills: 2 | Status: SHIPPED | OUTPATIENT
Start: 2022-10-28 | End: 2022-11-19

## 2022-10-29 RX ORDER — CLOBETASOL PROPIONATE 0.5 MG/G
CREAM TOPICAL
Qty: 60 G | Refills: 2 | Status: SHIPPED | OUTPATIENT
Start: 2022-10-29 | End: 2022-11-08

## 2022-10-31 ENCOUNTER — HOSPITAL ENCOUNTER (OUTPATIENT)
Dept: GENERAL RADIOLOGY | Age: 87
Discharge: HOME | End: 2022-10-31
Payer: MEDICARE

## 2022-10-31 ENCOUNTER — HOSPITAL ENCOUNTER (OUTPATIENT)
Age: 87
Discharge: HOME | End: 2022-10-31
Payer: MEDICARE

## 2022-10-31 DIAGNOSIS — S20.212A CONTUSION, CHEST WALL, LEFT, INITIAL ENCOUNTER: Primary | ICD-10-CM

## 2022-10-31 DIAGNOSIS — S20.212A CONTUSION, CHEST WALL, LEFT, INITIAL ENCOUNTER: ICD-10-CM

## 2022-10-31 PROCEDURE — 71100 X-RAY EXAM RIBS UNI 2 VIEWS: CPT

## 2022-10-31 PROCEDURE — 71046 X-RAY EXAM CHEST 2 VIEWS: CPT

## 2022-10-31 RX ORDER — EZETIMIBE 10 MG/1
10 TABLET ORAL DAILY
Qty: 90 TABLET | Refills: 1 | Status: SHIPPED | OUTPATIENT
Start: 2022-10-31 | End: 2022-11-19

## 2022-10-31 RX ORDER — LEVOFLOXACIN 250 MG/1
250 TABLET ORAL DAILY
Qty: 7 TABLET | Refills: 0 | Status: SHIPPED | OUTPATIENT
Start: 2022-10-31 | End: 2022-11-01 | Stop reason: SDUPTHER

## 2022-10-31 NOTE — PROGRESS NOTES
Patient reported history of fall and bruised left sided chest  wall pleuritic pain  will order Xrays

## 2022-11-01 RX ORDER — LEVOFLOXACIN 250 MG/1
250 TABLET ORAL DAILY
Qty: 7 TABLET | Refills: 0 | Status: SHIPPED | OUTPATIENT
Start: 2022-11-01 | End: 2022-11-08

## 2022-11-05 RX ORDER — GUAIFENESIN, PSEUDOEPHEDRINE HYDROCHLORIDE 600; 60 MG/1; MG/1
1 TABLET, EXTENDED RELEASE ORAL EVERY 12 HOURS
Qty: 30 TABLET | Refills: 1 | Status: SHIPPED | OUTPATIENT
Start: 2022-11-05 | End: 2022-11-19

## 2022-11-05 RX ORDER — MONTELUKAST SODIUM 10 MG/1
10 TABLET ORAL DAILY
Qty: 30 TABLET | Refills: 3 | Status: SHIPPED | OUTPATIENT
Start: 2022-11-05 | End: 2022-11-20 | Stop reason: SDUPTHER

## 2022-11-08 RX ORDER — METOPROLOL TARTRATE 100 MG/1
100 TABLET ORAL 2 TIMES DAILY
Qty: 60 TABLET | Refills: 3 | Status: SHIPPED | OUTPATIENT
Start: 2022-11-08 | End: 2022-11-20 | Stop reason: SDUPTHER

## 2022-11-08 RX ORDER — BENZONATATE 100 MG/1
100 CAPSULE ORAL 2 TIMES DAILY PRN
Qty: 60 CAPSULE | Refills: 3 | Status: SHIPPED | OUTPATIENT
Start: 2022-11-08 | End: 2022-11-20 | Stop reason: SDUPTHER

## 2022-11-08 RX ORDER — LIDOCAINE 50 MG/G
1 PATCH TOPICAL DAILY
Qty: 20 PATCH | Refills: 0 | Status: SHIPPED | OUTPATIENT
Start: 2022-11-08 | End: 2022-11-19

## 2022-11-08 RX ORDER — VALSARTAN AND HYDROCHLOROTHIAZIDE 320; 12.5 MG/1; MG/1
1 TABLET, FILM COATED ORAL DAILY
Qty: 30 TABLET | Refills: 2 | Status: SHIPPED | OUTPATIENT
Start: 2022-11-08 | End: 2022-11-20 | Stop reason: SDUPTHER

## 2022-11-08 NOTE — PROGRESS NOTES
Constantly running high BP replaced Coreg with Metoprolol  Renew Benzonatate    Lidoderm patch for Chest wall pain due to recent rib fractures    Change Diovan 160 to Diovan /12.5

## 2022-11-19 NOTE — PROGRESS NOTES
Current Outpatient Medications on File Prior to Visit   Medication Sig Dispense Refill    valsartan-hydroCHLOROthiazide (DIOVAN-HCT) 320-12.5 MG per tablet Take 1 tablet by mouth daily 30 tablet 2    metoprolol (LOPRESSOR) 100 MG tablet Take 1 tablet by mouth 2 times daily 60 tablet 3    benzonatate (TESSALON) 100 MG capsule Take 1 capsule by mouth 2 times daily as needed for Cough 60 capsule 3    cloNIDine (CATAPRES) 0.1 MG tablet Take 1 tablet by mouth 2 times daily 60 tablet 3    montelukast (SINGULAIR) 10 MG tablet Take 1 tablet by mouth daily 30 tablet 3    magnesium oxide (MAG-OX) 400 MG tablet Take 1 tablet by mouth daily 90 tablet 3    lipase-protease-amylase (CREON) 78211-88948 units delayed release capsule Take 1 capsule by mouth 3 times daily (with meals) 90 capsule 2    doxepin (SINEQUAN) 50 MG capsule Take 1 capsule by mouth nightly 90 capsule 1    Budeson-Glycopyrrol-Formoterol (BREZTRI AEROSPHERE) 160-9-4.8 MCG/ACT AERO Inhale 1 inhalation into the lungs in the morning and at bedtime 32 g 1    atorvastatin (LIPITOR) 10 MG tablet Take 1 tablet by mouth daily 90 tablet 1    loperamide (RA ANTI-DIARRHEAL) 2 MG capsule Take 1 capsule by mouth 4 times daily as needed for Diarrhea 360 capsule 1    omeprazole (PRILOSEC) 40 MG delayed release capsule Take 1 capsule by mouth in the morning. 90 capsule 3    SITagliptin (JANUVIA) 100 MG tablet Take 1 tablet by mouth in the morning. 90 tablet 3    fluticasone (FLONASE) 50 MCG/ACT nasal spray 1 spray by Each Nostril route in the morning. 3 each 1    hydrocortisone 2.5 % cream Apply topically 2 times daily.  60 g 1    Lancets MISC 1 each by Does not apply route 2 times daily 100 each 5    albuterol sulfate HFA (VENTOLIN HFA) 108 (90 Base) MCG/ACT inhaler Inhale 2 puffs into the lungs 4 times daily as needed for Wheezing 3 each 3    Cholecalciferol (VITAMIN D3) 50 MCG (2000 UT) CAPS Take 1 capsule by mouth daily 90 capsule 3    sodium chloride (OCEAN, BABY AYR) 0.65 % nasal spray 1 spray by Nasal route every 4 hours as needed for Congestion  0    aspirin 81 MG EC tablet Take 1 tablet by mouth 2 times daily 60 tablet 0    Carboxymethylcellul-Glycerin (REFRESH OPTIVE) 0.5-0.9 % SOLN Apply 1 drop to eye nightly Both eyes nightly      Biotin 1000 MCG TABS Take 1 tablet by mouth daily      vitamin B-12 (CYANOCOBALAMIN) 1000 MCG tablet Take 1,000 mcg by mouth daily      blood glucose monitor kit and supplies Test 2 times a day & as needed for symptoms of irregular blood glucose. 1 kit 0    blood glucose monitor strips Test 1 times a day & as needed for symptoms of irregular blood glucose. 50 strip 0    Lancets MISC 1 each by Does not apply route 2 times daily 100 each 5    Alcohol Swabs PADS 1 each by Does not apply route daily 100 each 2    blood glucose test strips (ASCENSIA AUTODISC VI;ONE TOUCH ULTRA TEST VI) strip 1 each by In Vitro route daily As needed. 100 each 3    calcium carbonate-vitamin D (CALCIUM 600 + D) 600-400 MG-UNIT TABS per tab Take 1 tablet by mouth daily (Patient taking differently: Take 1 tablet by mouth 2 times daily ) 90 tablet 0    Denture Care Products (POLIDENT 3 MINUTE) TBEF 1 tablet by Does not apply route daily 30 tablet 5    Multiple Vitamin (MULTIVITAMIN PO) Take 1 tablet by mouth daily.       No current facility-administered medications on file prior to visit.

## 2022-11-19 NOTE — PROGRESS NOTES
Current Outpatient Medications on File Prior to Visit   Medication Sig Dispense Refill    valsartan-hydroCHLOROthiazide (DIOVAN-HCT) 320-12.5 MG per tablet Take 1 tablet by mouth daily 30 tablet 2    metoprolol (LOPRESSOR) 100 MG tablet Take 1 tablet by mouth 2 times daily 60 tablet 3    benzonatate (TESSALON) 100 MG capsule Take 1 capsule by mouth 2 times daily as needed for Cough 60 capsule 3    cloNIDine (CATAPRES) 0.1 MG tablet Take 1 tablet by mouth 2 times daily 60 tablet 3    montelukast (SINGULAIR) 10 MG tablet Take 1 tablet by mouth daily 30 tablet 3    magnesium oxide (MAG-OX) 400 MG tablet Take 1 tablet by mouth daily 90 tablet 3    lipase-protease-amylase (CREON) 46426-24398 units delayed release capsule Take 1 capsule by mouth 3 times daily (with meals) 90 capsule 2    doxepin (SINEQUAN) 50 MG capsule Take 1 capsule by mouth nightly 90 capsule 1    Budeson-Glycopyrrol-Formoterol (BREZTRI AEROSPHERE) 160-9-4.8 MCG/ACT AERO Inhale 1 inhalation into the lungs in the morning and at bedtime 32 g 1    atorvastatin (LIPITOR) 10 MG tablet Take 1 tablet by mouth daily 90 tablet 1    loperamide (RA ANTI-DIARRHEAL) 2 MG capsule Take 1 capsule by mouth 4 times daily as needed for Diarrhea 360 capsule 1    omeprazole (PRILOSEC) 40 MG delayed release capsule Take 1 capsule by mouth in the morning. 90 capsule 3    SITagliptin (JANUVIA) 100 MG tablet Take 1 tablet by mouth in the morning. 90 tablet 3    fluticasone (FLONASE) 50 MCG/ACT nasal spray 1 spray by Each Nostril route in the morning. 3 each 1    hydrocortisone 2.5 % cream Apply topically 2 times daily.  60 g 1    Lancets MISC 1 each by Does not apply route 2 times daily 100 each 5    albuterol sulfate HFA (VENTOLIN HFA) 108 (90 Base) MCG/ACT inhaler Inhale 2 puffs into the lungs 4 times daily as needed for Wheezing 3 each 3    Cholecalciferol (VITAMIN D3) 50 MCG (2000 UT) CAPS Take 1 capsule by mouth daily 90 capsule 3    sodium chloride (OCEAN, BABY AYR) 0.65 % nasal spray 1 spray by Nasal route every 4 hours as needed for Congestion  0    aspirin 81 MG EC tablet Take 1 tablet by mouth 2 times daily 60 tablet 0    Carboxymethylcellul-Glycerin (REFRESH OPTIVE) 0.5-0.9 % SOLN Apply 1 drop to eye nightly Both eyes nightly      Biotin 1000 MCG TABS Take 1 tablet by mouth daily      vitamin B-12 (CYANOCOBALAMIN) 1000 MCG tablet Take 1,000 mcg by mouth daily      blood glucose monitor kit and supplies Test 2 times a day & as needed for symptoms of irregular blood glucose. 1 kit 0    blood glucose monitor strips Test 1 times a day & as needed for symptoms of irregular blood glucose. 50 strip 0    Lancets MISC 1 each by Does not apply route 2 times daily 100 each 5    Alcohol Swabs PADS 1 each by Does not apply route daily 100 each 2    blood glucose test strips (ASCENSIA AUTODISC VI;ONE TOUCH ULTRA TEST VI) strip 1 each by In Vitro route daily As needed. 100 each 3    calcium carbonate-vitamin D (CALCIUM 600 + D) 600-400 MG-UNIT TABS per tab Take 1 tablet by mouth daily (Patient taking differently: Take 1 tablet by mouth 2 times daily ) 90 tablet 0    Denture Care Products (POLIDENT 3 MINUTE) TBEF 1 tablet by Does not apply route daily 30 tablet 5    Multiple Vitamin (MULTIVITAMIN PO) Take 1 tablet by mouth daily. No current facility-administered medications on file prior to visit.

## 2022-11-20 RX ORDER — DOXEPIN HYDROCHLORIDE 50 MG/1
50 CAPSULE ORAL NIGHTLY
Qty: 90 CAPSULE | Refills: 1 | Status: SHIPPED | OUTPATIENT
Start: 2022-11-20

## 2022-11-20 RX ORDER — OMEPRAZOLE 40 MG/1
40 CAPSULE, DELAYED RELEASE ORAL DAILY
Qty: 90 CAPSULE | Refills: 3 | Status: SHIPPED | OUTPATIENT
Start: 2022-11-20 | End: 2023-02-18

## 2022-11-20 RX ORDER — ATORVASTATIN CALCIUM 10 MG/1
10 TABLET, FILM COATED ORAL DAILY
Qty: 90 TABLET | Refills: 1 | Status: SHIPPED | OUTPATIENT
Start: 2022-11-20 | End: 2023-02-18

## 2022-11-20 RX ORDER — FLUTICASONE PROPIONATE 50 MCG
1 SPRAY, SUSPENSION (ML) NASAL DAILY
Qty: 3 EACH | Refills: 1 | Status: SHIPPED | OUTPATIENT
Start: 2022-11-20 | End: 2023-02-18

## 2022-11-20 RX ORDER — BENZONATATE 100 MG/1
100 CAPSULE ORAL 2 TIMES DAILY PRN
Qty: 180 CAPSULE | Refills: 3 | Status: SHIPPED | OUTPATIENT
Start: 2022-11-20 | End: 2023-02-18

## 2022-11-20 RX ORDER — MONTELUKAST SODIUM 10 MG/1
10 TABLET ORAL DAILY
Qty: 90 TABLET | Refills: 3 | Status: SHIPPED | OUTPATIENT
Start: 2022-11-20 | End: 2023-02-18

## 2022-11-20 RX ORDER — ALBUTEROL SULFATE 90 UG/1
2 AEROSOL, METERED RESPIRATORY (INHALATION) 4 TIMES DAILY PRN
Qty: 3 EACH | Refills: 3 | Status: SHIPPED | OUTPATIENT
Start: 2022-11-20 | End: 2023-01-08 | Stop reason: SDUPTHER

## 2022-11-20 RX ORDER — CLONIDINE HYDROCHLORIDE 0.1 MG/1
0.1 TABLET ORAL 2 TIMES DAILY
Qty: 180 TABLET | Refills: 0 | Status: SHIPPED | OUTPATIENT
Start: 2022-11-20 | End: 2023-02-18

## 2022-11-20 RX ORDER — TRIAMCINOLONE ACETONIDE 0.1 %
PASTE (GRAM) DENTAL
Qty: 1 EACH | Refills: 1 | Status: SHIPPED | OUTPATIENT
Start: 2022-11-20 | End: 2022-11-27

## 2022-11-20 RX ORDER — ACETAMINOPHEN 160 MG
1 TABLET,DISINTEGRATING ORAL DAILY
Qty: 90 CAPSULE | Refills: 3 | Status: SHIPPED | OUTPATIENT
Start: 2022-11-20

## 2022-11-20 RX ORDER — EZETIMIBE 10 MG/1
10 TABLET ORAL DAILY
Qty: 90 TABLET | Refills: 0 | Status: SHIPPED | OUTPATIENT
Start: 2022-11-20 | End: 2023-02-18

## 2022-11-20 RX ORDER — BUDESONIDE, GLYCOPYRROLATE, AND FORMOTEROL FUMARATE 160; 9; 4.8 UG/1; UG/1; UG/1
1 AEROSOL, METERED RESPIRATORY (INHALATION) 2 TIMES DAILY
Qty: 32 G | Refills: 1 | Status: SHIPPED | OUTPATIENT
Start: 2022-11-20 | End: 2023-02-18

## 2022-11-20 RX ORDER — VALSARTAN AND HYDROCHLOROTHIAZIDE 320; 12.5 MG/1; MG/1
1 TABLET, FILM COATED ORAL DAILY
Qty: 90 TABLET | Refills: 0 | Status: SHIPPED | OUTPATIENT
Start: 2022-11-20 | End: 2023-01-04 | Stop reason: SDUPTHER

## 2022-11-20 RX ORDER — METOPROLOL TARTRATE 100 MG/1
100 TABLET ORAL 2 TIMES DAILY
Qty: 180 TABLET | Refills: 1 | Status: SHIPPED | OUTPATIENT
Start: 2022-11-20 | End: 2023-01-04 | Stop reason: SDUPTHER

## 2022-11-22 DIAGNOSIS — I10 ESSENTIAL HYPERTENSION: Primary | ICD-10-CM

## 2022-11-30 RX ORDER — AMLODIPINE BESYLATE 10 MG/1
10 TABLET ORAL DAILY
Qty: 30 TABLET | Refills: 5 | Status: SHIPPED | OUTPATIENT
Start: 2022-11-30

## 2022-12-07 RX ORDER — VALSARTAN AND HYDROCHLOROTHIAZIDE 320; 12.5 MG/1; MG/1
1 TABLET, FILM COATED ORAL DAILY
Qty: 30 TABLET | Refills: 2 | Status: SHIPPED | OUTPATIENT
Start: 2022-12-07 | End: 2023-01-04 | Stop reason: SDUPTHER

## 2022-12-07 RX ORDER — METOPROLOL TARTRATE 100 MG/1
100 TABLET ORAL 2 TIMES DAILY
Qty: 60 TABLET | Refills: 3 | Status: SHIPPED | OUTPATIENT
Start: 2022-12-07 | End: 2023-01-04 | Stop reason: SDUPTHER

## 2022-12-14 RX ORDER — AMLODIPINE BESYLATE 10 MG/1
10 TABLET ORAL DAILY
Qty: 90 TABLET | Refills: 3 | Status: SHIPPED | OUTPATIENT
Start: 2022-12-14 | End: 2023-02-14 | Stop reason: SDUPTHER

## 2023-01-04 RX ORDER — VALSARTAN AND HYDROCHLOROTHIAZIDE 320; 12.5 MG/1; MG/1
1 TABLET, FILM COATED ORAL DAILY
Qty: 90 TABLET | Refills: 1 | Status: SHIPPED | OUTPATIENT
Start: 2023-01-04 | End: 2023-02-14 | Stop reason: SDUPTHER

## 2023-01-04 RX ORDER — VALSARTAN AND HYDROCHLOROTHIAZIDE 320; 12.5 MG/1; MG/1
1 TABLET, FILM COATED ORAL DAILY
Qty: 90 TABLET | Refills: 0 | Status: SHIPPED | OUTPATIENT
Start: 2023-01-04 | End: 2023-01-04 | Stop reason: SDUPTHER

## 2023-01-04 RX ORDER — METOPROLOL TARTRATE 100 MG/1
100 TABLET ORAL 2 TIMES DAILY
Qty: 180 TABLET | Refills: 0 | Status: SHIPPED | OUTPATIENT
Start: 2023-01-04 | End: 2023-01-04 | Stop reason: SDUPTHER

## 2023-01-04 RX ORDER — METOPROLOL TARTRATE 100 MG/1
100 TABLET ORAL 2 TIMES DAILY
Qty: 60 TABLET | Refills: 3 | Status: SHIPPED | OUTPATIENT
Start: 2023-01-04 | End: 2023-03-02

## 2023-01-04 RX ORDER — METOPROLOL TARTRATE 100 MG/1
100 TABLET ORAL 2 TIMES DAILY
Qty: 60 TABLET | Refills: 2 | Status: SHIPPED | OUTPATIENT
Start: 2023-01-04 | End: 2023-01-04 | Stop reason: SDUPTHER

## 2023-01-04 RX ORDER — VALSARTAN AND HYDROCHLOROTHIAZIDE 320; 12.5 MG/1; MG/1
1 TABLET, FILM COATED ORAL DAILY
Qty: 30 TABLET | Refills: 2 | Status: SHIPPED | OUTPATIENT
Start: 2023-01-04 | End: 2023-03-02

## 2023-01-04 RX ORDER — METOPROLOL TARTRATE 100 MG/1
100 TABLET ORAL 2 TIMES DAILY
Qty: 180 TABLET | Refills: 1 | Status: SHIPPED | OUTPATIENT
Start: 2023-01-04 | End: 2023-02-14 | Stop reason: SDUPTHER

## 2023-01-04 RX ORDER — VALSARTAN AND HYDROCHLOROTHIAZIDE 320; 12.5 MG/1; MG/1
1 TABLET, FILM COATED ORAL DAILY
Qty: 30 TABLET | Refills: 2 | Status: SHIPPED | OUTPATIENT
Start: 2023-01-04 | End: 2023-01-04 | Stop reason: SDUPTHER

## 2023-02-28 ENCOUNTER — OFFICE VISIT (OUTPATIENT)
Dept: ORTHOPEDIC SURGERY | Age: 88
End: 2023-02-28

## 2023-02-28 ENCOUNTER — HOSPITAL ENCOUNTER (OUTPATIENT)
Dept: CT IMAGING | Age: 88
Discharge: HOME | End: 2023-02-28
Payer: MEDICARE

## 2023-02-28 VITALS — WEIGHT: 114 LBS | HEIGHT: 59 IN | BODY MASS INDEX: 22.98 KG/M2

## 2023-02-28 DIAGNOSIS — S62.609A CLOSED FRACTURE OF MULTIPLE PHALANGES OF DIGIT OF HAND, INITIAL ENCOUNTER: Primary | ICD-10-CM

## 2023-02-28 DIAGNOSIS — S62.609A CLOSED FRACTURE OF MULTIPLE PHALANGES OF DIGIT OF HAND, INITIAL ENCOUNTER: ICD-10-CM

## 2023-02-28 PROCEDURE — 73200 CT UPPER EXTREMITY W/O DYE: CPT

## 2023-02-28 NOTE — PROGRESS NOTES
CHIEF COMPLAINT: Right hand pain/ index finger, long (middle) finger, ring finger, and short (pinkie) finger proximal phalanx fracture, with possible dislocation MCP joint. DATE OF INJURY: 2/27/2023    HISTORY:  Ms. Tish Solis 80 y.o.  female right handed presents today for the first visit for evaluation of a right hand injury which occurred when she fell at the independent living facility. She is complaining of  right hand pain and swelling. This is better with elevation and worse with ROM. The pain is sharp and not radiating. She rates her pain a 8/10 VAS. No other complaint. She was seen at at [address] [place] [address] for right hand pain, was evaluated and splinted and asked to see orthopedics. She lives in independent living. She is Dr. Apollo Acevedo mother-in-law. Denies smoking.     Past Medical History:   Diagnosis Date    Arthritis     Diabetes mellitus ([facility] [address]     HYPERCHOLESTERAEMIA     Hypertension     TIA (transient ischaemic attack) 2003       Past Surgical History:   Procedure Laterality Date    COLECTOMY      JOINT REPLACEMENT  04/22/2010    left knee    PATELLA FRACTURE SURGERY Left 5/2/2021    PATELLA OPEN REDUCTION INTERNAL FIXATION performed by Cayla Flannery MD at [address] [place] [address] 83  8/19/2010    right, cemented       Social History     Socioeconomic History    Marital status:      Spouse name: Not on file    Number of children: Not on file    Years of education: Not on file    Highest education level: Not on file   Occupational History    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[employer]    Tobacco Use    Smoking status: Never    Smokeless tobacco: Never   Vaping Use    Vaping Use: Never used   Substance and Sexual Activity    Alcohol use: No    Drug use: No    Sexual activity: Never   Other Topics Concern    Not on file   Social History Narrative     ,  4 children grown , 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 [facility], worked as a school Volunteer      Social Determinants of Health Financial Resource Strain: Not on file   Food Insecurity: Not on file   Transportation Needs: Not on file   Physical Activity: Insufficiently Active    Days of Exercise per Week: 2 days    Minutes of Exercise per Session: 30 min   Stress: Not on file   Social Connections: Not on file   Intimate Partner Violence: Not on file   Housing Stability: Not on file       Family History   Problem Relation Age of Onset    Arthritis Mother        Current Outpatient Medications on File Prior to Visit   Medication Sig Dispense Refill    ezetimibe (ZETIA) 10 MG tablet Take 1 tablet by mouth daily 90 tablet 0    albuterol sulfate HFA (PROVENTIL;VENTOLIN;PROAIR) 108 (90 Base) MCG/ACT inhaler Inhale 2 puffs into the lungs every 4 hours as needed for Wheezing 25.5 g 5    valsartan-hydroCHLOROthiazide (DIOVAN-HCT) 320-12.5 MG per tablet Take 1 tablet by mouth daily 90 tablet 1    metoprolol (LOPRESSOR) 100 MG tablet Take 1 tablet by mouth 2 times daily 180 tablet 1    amLODIPine (NORVASC) 10 MG tablet Take 1 tablet by mouth daily 90 tablet 3    montelukast (SINGULAIR) 10 MG tablet Take 1 tablet by mouth daily 90 tablet 3    doxepin (SINEQUAN) 50 MG capsule Take 1 capsule by mouth nightly 90 capsule 1    atorvastatin (LIPITOR) 10 MG tablet Take 1 tablet by mouth daily 90 tablet 1    Budeson-Glycopyrrol-Formoterol (BREZTRI AEROSPHERE) 160-9-4.8 MCG/ACT AERO Inhale 1 inhalation into the lungs in the morning and at bedtime 32 g 1    omeprazole (PRILOSEC) 40 MG delayed release capsule Take 1 capsule by mouth daily 90 capsule 3    SITagliptin (JANUVIA) 100 MG tablet Take 1 tablet by mouth daily 90 tablet 3    benzonatate (TESSALON) 100 MG capsule Take 1 capsule by mouth 2 times daily as needed for Cough 180 capsule 3    valsartan-hydroCHLOROthiazide (DIOVAN-HCT) 320-12.5 MG per tablet Take 1 tablet by mouth daily 30 tablet 2    metoprolol (LOPRESSOR) 100 MG tablet Take 1 tablet by mouth 2 times daily 60 tablet 3    metoprolol (LOPRESSOR) 100 MG tablet Take 1 tablet by mouth 2 times daily 60 tablet 2    valsartan-hydroCHLOROthiazide (DIOVAN-HCT) 320-12.5 MG per tablet Take 1 tablet by mouth daily 30 tablet 2    lipase-protease-amylase (CREON) 38758-51295 units delayed release capsule Take 1 capsule by mouth 3 times daily (with meals) 270 capsule 1    fluticasone (FLONASE) 50 MCG/ACT nasal spray 1 spray by Each Nostril route daily 3 each 1    Cholecalciferol (VITAMIN D3) 50 MCG (2000 UT) CAPS Take 1 capsule by mouth daily 90 capsule 3    hydrocortisone 2.5 % cream Apply topically 2 times daily. 60 g 1    Lancets MISC 1 each by Does not apply route 2 times daily 100 each 5    sodium chloride (OCEAN, BABY AYR) 0.65 % nasal spray 1 spray by Nasal route every 4 hours as needed for Congestion  0    aspirin 81 MG EC tablet Take 1 tablet by mouth 2 times daily 60 tablet 0    Carboxymethylcellul-Glycerin (REFRESH OPTIVE) 0.5-0.9 % SOLN Apply 1 drop to eye nightly Both eyes nightly      Biotin 1000 MCG TABS Take 1 tablet by mouth daily      vitamin B-12 (CYANOCOBALAMIN) 1000 MCG tablet Take 1,000 mcg by mouth daily      blood glucose monitor kit and supplies Test 2 times a day & as needed for symptoms of irregular blood glucose. 1 kit 0    blood glucose monitor strips Test 1 times a day & as needed for symptoms of irregular blood glucose. 50 strip 0    Lancets MISC 1 each by Does not apply route 2 times daily 100 each 5    Alcohol Swabs PADS 1 each by Does not apply route daily 100 each 2    blood glucose test strips (ASCENSIA AUTODISC VI;ONE TOUCH ULTRA TEST VI) strip 1 each by In Vitro route daily As needed.  100 each 3    calcium carbonate-vitamin D (CALCIUM 600 + D) 600-400 MG-UNIT TABS per tab Take 1 tablet by mouth daily (Patient taking differently: Take 1 tablet by mouth 2 times daily ) 90 tablet 0    Denture Care Products (POLIDENT 3 MINUTE) TBEF 1 tablet by Does not apply route daily 30 tablet 5    Multiple Vitamin (MULTIVITAMIN PO) Take 1 tablet by mouth daily. No current facility-administered medications on file prior to visit. Pertinent items are noted in HPI  Review of systems reviewed from Patient History Form and available in the patient's chart under the Media tab. No change noted. PHYSICAL EXAMINATION:  Ms. Jessica Dorado is a very pleasant 80 y.o. female who presents today in no acute distress, awake, alert, and oriented. She is well dressed, nourished and  groomed. Patient with normal affect. Height is  4' 11\" (1.499 m), weight is 114 lb (51.7 kg), Body mass index is 23.03 kg/m². Resting respiratory rate is 16. Examination of the gait, showed that the patient walks with a slow gait. Examination of both upper extremities showing a decreased range of motion of the right index finger, long (middle) finger, ring finger, and short (pinkie) finger compare to the other side. There is moderate swelling that can be seen, as well as ecchymosis of the index finger, long (middle) finger, ring finger, and short (pinkie) finger. She has intact sensation and good radial pulses. She has significant tenderness on deep palpation over the proximal phalanx of the index finger, long (middle) finger, ring finger, and short (pinkie) finger right hand. There is rotational deformity of the right long (middle) finger and ring finger with dislocation deformity of the third and fourth finger MCP joint. IMAGING: Venu Rosenthal were reviewed, dated 2/27/2023 from [facility],  3 views of the right hand, and showed a index finger, long (middle) finger, ring finger, and short (pinkie) finger proximal phalanx minimally displaced fracture, with possible dislocation at the third and fourth MCP joint. IMPRESSION: Right hand index finger, long (middle) finger, ring finger, and short (pinkie) finger proximal phalanx minimally displaced fracture with possible dislocation at the third and fourth MCP joint.     PLAN:  I discussed that the overall alignment of this fracture is intra-articular and displaced, but cannot exclude dislocation at the MCP joint. Recommend she be put in a brace for immobilization that was applied today and instructed in care. Recommend getting a CT scan to further evaluate for fracture and dislocation. Follow-up results. She is aware that she may need surgical repair and reduction. Procedures    Aisha Chance TKO     Patient was prescribed a Aisha Chance TKO. The right hand will require stabilization / immobilization from this semi-rigid / rigid orthosis to improve their function. The orthosis will assist in protecting the affected area, provide functional support and facilitate healing. The patient was educated and fit by a healthcare professional with expert knowledge and specialization in brace application while under the direct supervision of the physician. Verbal and written instructions for the use of and application of this item were provided. They were instructed to contact the office immediately should the brace result in increased pain, decreased sensation, increased swelling or worsening of the condition.      King Watson MD

## 2023-03-01 ENCOUNTER — OFFICE VISIT (OUTPATIENT)
Dept: ORTHOPEDIC SURGERY | Age: 88
End: 2023-03-01

## 2023-03-01 VITALS — WEIGHT: 114 LBS | RESPIRATION RATE: 16 BRPM | BODY MASS INDEX: 22.98 KG/M2 | HEIGHT: 59 IN

## 2023-03-01 DIAGNOSIS — S62.619A CLOSED DISPLACED FRACTURE OF PROXIMAL PHALANX OF FINGER, UNSPECIFIED FINGER, INITIAL ENCOUNTER: Primary | ICD-10-CM

## 2023-03-01 NOTE — LETTER
CONSENT TO OPERATION  AND/OR OTHER PROCEDURE(S)          PATIENT : Ani Goss   YOB: 1935      DATE : 3/1/23          1. I request and consent that Dr. Kavita Thompson. Briana Helm,  and/or his associates or assistants perform an operation and/or procedures on the above patient at  [place] [address], to treat the condition(s) which appear indicated by the diagnostic studies already performed. I have been told that in general terms the nature, purpose and reasonable expectations of the operation and/or procedure(s) are:      Closed Reduction & Percutaneous Pinning of   right Index Finger, Middle Finger, Ring Finger and Small Finger   Proximal Phalanx Fractures       2. It has been explained to me by the informing physician that during the course of the operation and/or procedure(s) unforeseen conditions may be revealed that necessitate an extension of the original operation and/or procedure(s) or different operation and/or procedures than those set forth in Paragraph 1. I therefore authorize and request that my physician and/or his associates or assistants perform such operations and/or procedures as are necessary and desirable in the exercise of professional judgment. The authority granted under this Paragraph 2 shall extend to all conditions that require treatment and are known to my physician at the time the operation is commenced. 3. I have been made aware by the informing physician of certain risks and consequences that are associated with the operation and/or procedure(s) described in Paragraph 1, the reasonable alternative methods or treatment, the possible consequences, the possibility that the operation and/or procedure(s) may be unsuccessful and the possibility of complications.   I understand the reasonably known risks to be:      - Bleeding  - Infection  - Poor Healing  - Possible Damage to Nerve, Vessel, Tendon/Muscle or Bone  - Need for further Treatment/Surgery  - Stiffness  - Pain  - Residual or Recurrent Symptoms  - Anesthetic and/or Medical Risks  - We have discussed the specific limitations and risks of hospital and/or office based treatment at this time due to the COVID-19 pandemic                I have been counseled about the risks of jose Covid-19 in the dewayne-operative and post-operative periods related to this procedure. I have been made aware that jose Covid-19 around the time of a surgical procedure may worsen my prognosis for recovering from the virus and lend to a higher morbidity and or mortality risk. With this knowledge, I have requested to proceed with the procedure as scheduled. 4. I have also been informed by the informing physician that there are other risks from both known and unknown causes that are attendant to the performance of any surgical procedure. I am aware that the practice of medicine and surgery is not an exact science, and that no guarantees have been made to me concerning the results of the operation and/or procedure(s). 5. I   CONSENT / REFUSE CONSENT  (strike the phrase that does not apply) to the taking of photographs before, during and/or after the operation or procedure for scientific/educational purposes. 6. I consent to the administration of anesthesia and to the use of such anesthetics as may be deemed advisable by the anesthesiologist who has been engaged by me or my physician.     7. I certify that I have read and understand the above consent to operation and/or other procedure(s); that the explanations therein referred to were made to me by the informing physician in advance of my signing this consent; that all blanks or statements requiring insertion or completion were filled in and inapplicable paragraphs, if any, were stricken before I signed; and that all questions asked by me about the operation and/or procedure(s) which I have consented to have been fully answered in a satisfactory manner.                                 _______________________           3/1/23                              Witness     Signature Of Patient         Date        Chacha Carpenter                                                 Informing Physician                                           Signature of Informing Physician                              If patient is unable to sign or is a minor, complete one of the following:    (A)  Patient is a minor   years of age. (B)  Patient is unable to sign because: The undersigned represents that he or she is duly authorized to execute this consent for and on behalf of the above named patient. Witness               o  Parent  o  Guardian   o  Spouse       o  Other (specify)                                           Patient Name: aNina Haas  Patient YOB: 1935  Dr. Lisa Tobar' Return To Work Policy  Regarding your ability to return to work after surgery or injury, Dr. Lisa Tobar will not state that any patient is off of work or cannot work at all. He will place you on restrictions after your surgical procedure or injury. Depending on the details of your particular situation, Dr. Lisa Tobar may state that you will have either light use or no use of your hand for a specific number of weeks. It is your obligation to communicate with your employer regarding your restrictions. It is your employer's decision as to whether they will accommodate your restrictions (i.e. allow you to come to work in your restricted capacity) or to not allow you to return to work under your restrictions. Dr. Lisa Tobar does not participate in making this decision and cannot influence your employer regarding their decision. If you do not communicate your restrictions to your employer, or if you do not present to work as you are scheduled to, Dr. Lisa Tobar will not provide an 'excuse' to explain your absence.   A doctors note, or official forms ([address], [facility], etc.) will be filled out, upon request, to indicate your date of surgery and your restrictions as stated above. Dr. Radha Amaya' Narcotic Policy  Patients will only be prescribed narcotics after surgical procedures or significant injury. Not all procedures cause pain great enough to require Narcotics and thus, not all patients will receive prescriptions after surgical procedures or injuries. Narcotics are never prescribed for chronic conditions. Narcotics are never prescribed for use longer than one week at a time. Refills are only granted in unusual circumstances and only at Dr. Maryam Kovacs discretion. Patients who are receiving narcotic medication from another physician or who are under pain management contracts will not be given a prescription for narcotics for any reason. Surgery Arrival Time:  You have been advised of the START TIME for your surgery as well as the ARRIVAL TIME at which you need to arrive at the surgery facility. Please understand that there is a certain amount of preparation which takes place at the surgery facility prior to the start of your surgery. If you arrive later than your scheduled ARRIVAL TIME, it may be necessary to cancel your surgery for that day and reschedule your procedure due to lack of adequate time for pre-surgery preparations. Thank you for being on time for your arrival.    I have read the above policies and understand that by agreeing to proceed with treatment by Dr. Layo Lehman and his team, that I am agreeing to abide by these policies.   Patient Name:  Colon January    Patient Signature:  _____________________________    Amanda Gilberto Date:   3/1/23

## 2023-03-01 NOTE — Clinical Note
Dear  Johana Shepard MD,  Thank you very much for your referral or Ms. Baljeet Frank to me for evaluation and treatment of her Hand & Wrist condition. I appreciate your confidence in me and thank you for allowing me the opportunity to care for your patients. If I can be of any further assistance to you on this or any other patient, please do not hesitate to contact me. Sincerely,  Shannon Fry.  Jr Rosario MD

## 2023-03-01 NOTE — LETTER
[facility] SURGERY  Surgery Scheduling Form:  DEMOGRAPHICS:                                                                                                              .    Patient Name:  Disha Aguilar  Patient :  1935   Patient SS#:  [id number]    Patient Phone:  [phone] (home)  Alt. Patient Phone:    Patient Address:  [address]  [address] [place] [address]    PCP:  Veronica Brown MD  Insurance:   Payer/Plan Subscr  Sex Relation Sub. Ins. ID Effective Group Num   1. [facility] MEDICARE Chacha Galvez* 1935 Female Self [id number] 3/1/20 OHDSNP                                   [address]   2. Hansa Shea* 1935 Female Self [id number]                                   [address] [address]     DIAGNOSIS & PROCEDURE:                                                                                            .    Diagnosis:   right Index Finger, Middle Finger, Ring Finger and Small Finger Proximal Phalanx Fracture S62.619A  Operation:  Closed Reduction and Percutaneous Pinning of right Index Finger, Middle Finger, Ring Finger and Small Finger Proximal Phalanx Fractures  [CPT: 63946]  Location:  [facility]   Surgeon:  Anila Kwong    SCHEDULING INFORMATION:                                                                                         .    Surgeon's Scheduling Instruction:  within 7 - 10 days    RN Post-op Appt:  [] Yes   [x] No  Preferred Thursday:   [] Yes   [] No    Requested Date:  23 OR Time:  11:10am Patient Arrival Time:    OR Time Required:  30  Minutes  Anesthesia:  General  Equipment:  K-Wires, TPS  Mini C-Arm:  Yes   Standard C-Arm:  No  Status:  outpatient  PAT Required:  Yes  Comments:                      Dottie Arthur.  Fermin Rivas MD  3/1/23 2:56 PM    BILLING INFORMATION:                                                                                                    .    Procedure:       CPT Code Modifier  Closed Reduction and Percutaneous Pinning of right Index Finger, Middle Finger, Ring Finger and Small Finger Proximal Phalanx Fracture    .

## 2023-03-02 ENCOUNTER — TELEPHONE (OUTPATIENT)
Dept: ORTHOPEDIC SURGERY | Age: 88
End: 2023-03-02

## 2023-03-02 NOTE — PROGRESS NOTES
Ms. Chris Cason is a 80 y.o. right handed woman  who is seen today in Hand Surgical Consultation at the request of Juventino Garcia MD.    She is seen today regarding an injury occurring on February 26th, 2023. She reports injuring her right Index Finger, Middle Finger, Ring Finger, and Small Finger, having Fallen  at Home. At the time of injury, there was clear dislocation or malposition of the finger. She was seen for Emergency evaluation elsewhere, radiographs were obtained & she has been immobilized. By report, there  was not an associated skin injury. She reports moderate pain located in the Proximal aspect of the Index Finger, Middle Finger, Ring Finger, and Small Finger, no tenderness of the wrist or elbow. She notes today, no neurologic symptoms in the Whole Hand. Symptoms show no change over time. I have today reviewed with Chris Cason the clinically relevant, past medical history, medications, allergies,  family history, social history, and Review Of Systems & I have documented any details relevant to today's presenting complaints in my history above. Ms. Savage Zhu Lyn's self-reported past medical history, medications, allergies,  family history, social history, and Review Of Systems have been scanned into the chart under the \"Media\" tab. Physical Exam:  Ms. Kaushik Dejesus most recent vitals:  Vitals  Resp: 16  Height: 4' 11\" (149.9 cm)  Weight: 114 lb (51.7 kg)    She is well nourished, oriented to person, place & time. She demonstrates appropriate mood and affect as well as normal gait and station.     Skin: is intact, with moderate ecchymoses  Right side   Digital range of motion is limited by pain on the Right, normal on the Left  Wrist range of motion is without significant limitation on the Right, normal on the Left  Sensation is subjectively present in the Whole Hand, other digits all show normal sensation on the Right, normal on the Left  Vascular examination reveals good capillary refill bilaterally. There is moderate acute ecchymosis. Swelling is moderate in the Whole Hand, centered about the Proximal Phalanx. No other digit shows significant swelling bilaterally  There is no evidence of gross joint instability bilaterally. Muscular strength is clinically appropriate bilaterally. Maximal pain is elicited with palpation of the Proximal Phalanx of the Index Finger, Middle Finger, Ring Finger, and Small Finger. The base of the hand & wrist are mildly tender to palpation. There is a 45 degree angular deformity and moderate clinical evidence of  mal-rotation of the injured digit. Radiographic Evaluation:  Radiographs, taken From another [de-identified] Office outside of my practice were Personally Reviewed & Interpreted by myself today (3 views of the right hand). They demonstrate evidence of an acute fracture of the Base of the Proximal Phalanx of the Index Finger, Middle Finger, Ring Finger, and Small Finger with moderate comminution, 40 degrees of angular deformity and moderate  mal-rotation. The fracture does involve the joint surface in several fingers. There is not evidence of other injury or bony fracture. Impression:  Ms. Naina Haas has sustained recent dorsally angulated Index Finger, Middle Finger, Ring Finger, and Small Finger Proximal Phalanx Base fracture(s) and presents requesting further treatment. Plan:    I have discussed with Ms. Naina Haas the various treatment options for treatment of right  Index Finger, Middle Finger, Ring Finger, and Small Finger Proximal Phalanx fracture. We discussed the options of Closed treatment in situ, attempted closed reduction & cast immobilization, and Open Reduction & Internal Fixation of the fracture. I have explained the pre-, dewayne- ane post-operative considerations to her.  she has elected to proceed with surgical treatment Closed Reduction & Percutaneous Pinning of her fracture.   She has voiced an understanding of the other options available to her. I have explained the complications, limitations, expectations, alternatives, & risks of her chosen treatment. We discussed the possibility of residual symptoms as may be related to surgical treatment of fractures including the possiblities of: mal-union, non-union, delayed union, persistant deformity, persistant pain, limitation of motion, future arthritic symptoms & the possible need for further treatment. We also specifically discussed risks related to any surgical procedure including: bleeding, infection, scar formation, & possible need for repeat operation. She understood our discussion and was comfortable with her decision; she was provided with appropriate expectations. I had an extensive discussion with Ms. Ani Goss  and any family members present regarding the natural history, etiology, and long term consequences of this problem. I have outlined a treatment plan with them and, in my opinion, surgical intervention is indicated at this time. I have discussed with them the potential complications, limitations, expectations, alternatives, and risks of the procedure. She has had full opportunity to ask their questions. I have answered them all to her satisfaction. I feel that the patient and any present family members do understand our discussion today and she has provided informed consent for Closed Reduction & Percutaneous Pinning of her  right  Index Finger, Middle Finger, Ring Finger, and Small Finger Proximal Phalanx fracture. She is appropriately immobilized and protected until the time of the scheduled surgery. She is specifically instructed to contact the office between now & her scheduled appointment if she has concerns related to the cast or the underlying fracture. She is welcome to call for an appointment sooner if she has any additional concerns or questions.

## 2023-03-02 NOTE — PATIENT INSTRUCTIONS
Pre-Operative Instructions    1. The night before your surgery, unless otherwise instructed, do not eat any food, drink any liquids, chew gum or mints after midnight. Abstain from alcohol for 24 hours prior to surgery. 2. You will be contacted by the Hospital the working day prior to your procedure to confirm your arrival time. 3. Patients under 25years of age must have a parent or legal guardian present to sign their consent and discharge paperwork. 4. On the day of surgery,  you will be seen pre-operatively by an anesthesiologist.     5. If you are having hand surgery, it is recommended that nail polish and acrylic nails be removed prior to surgery if possible. 6. Please bring cases for glasses, contact lenses, hearing aids or dentures. They will likely be removed prior to surgery. 7. Wear casual, loose-fitting and comfortable clothing. Consider that you may have a large dressing to fit under your clothing after surgery. 9. Please do not bring valuables such as jewelry or large sums of cash to the hospital. Remove all body piercings before coming to the hospital. Federico Sheth may not  wear any rings on the hand if you are having surgery on that hand, wrist or elbow. 10. Do not smoke or chew tobacco before your surgery. [address] [place] [address] and surgery facilities are smoke-free environments. Smoking is not permitted anywhere on campus. 11. Be sure to follow any additional instructions from your physician. If the above conditions are not met, your surgery may be cancelled and rescheduled for another day. Should you develop any change in your health such as fever, cough, sore throat, cold, flu, or infection, or if you have any questions regarding your Pre-admission or surgery, please contact [address] - Surgery Scheduling at [phone], Monday through Friday, 9 a.m. to 5 p.m.

## 2023-03-02 NOTE — TELEPHONE ENCOUNTER
Auth: # [id number]    Date: 3/7/23  Type of SX:  OUTPATIENT  Location: [facility]  CPT: 42445   DX Code: C03.538L  SX area: RT HAND  Insurance: [place] [employer]

## 2023-03-03 ENCOUNTER — TELEPHONE (OUTPATIENT)
Dept: ORTHOPEDIC SURGERY | Age: 88
End: 2023-03-03

## 2023-03-03 PROBLEM — S82.042A CLOSED COMMINUTED FRACTURE OF LEFT PATELLA, INITIAL ENCOUNTER: Status: RESOLVED | Noted: 2021-04-30 | Resolved: 2023-03-03

## 2023-03-03 RX ORDER — BUDESONIDE, GLYCOPYRROLATE, AND FORMOTEROL FUMARATE 160; 9; 4.8 UG/1; UG/1; UG/1
1 AEROSOL, METERED RESPIRATORY (INHALATION) 2 TIMES DAILY
Qty: 96 G | Refills: 1 | Status: SHIPPED | OUTPATIENT
Start: 2023-03-03 | End: 2023-04-23 | Stop reason: SDUPTHER

## 2023-03-05 PROBLEM — S62.609A: Status: ACTIVE | Noted: 2023-03-05

## 2023-03-06 ENCOUNTER — ANESTHESIA EVENT (OUTPATIENT)
Dept: OPERATING ROOM | Age: 88
End: 2023-03-06

## 2023-03-07 ENCOUNTER — APPOINTMENT (OUTPATIENT)
Dept: GENERAL RADIOLOGY | Age: 88
End: 2023-03-07
Attending: INTERNAL MEDICINE
Payer: MEDICARE

## 2023-03-07 ENCOUNTER — ANESTHESIA (OUTPATIENT)
Dept: OPERATING ROOM | Age: 88
End: 2023-03-07

## 2023-03-07 ENCOUNTER — HOSPITAL ENCOUNTER (OUTPATIENT)
Age: 88
Setting detail: OBSERVATION
Discharge: NURSING, REHAB OR LONG-TERM CARE | End: 2023-03-12
Attending: INTERNAL MEDICINE | Admitting: INTERNAL MEDICINE
Payer: MEDICARE

## 2023-03-07 ENCOUNTER — TELEPHONE (OUTPATIENT)
Dept: ORTHOPEDIC SURGERY | Age: 88
End: 2023-03-07

## 2023-03-07 DIAGNOSIS — S62.91XD: Primary | ICD-10-CM

## 2023-03-07 PROBLEM — J45.909 REACTIVE AIRWAY DISEASE WITHOUT COMPLICATION: Status: ACTIVE | Noted: 2023-03-07

## 2023-03-07 LAB
A/G RATIO: 1.5 (ref 1.1–2.2)
ALBUMIN SERPL-MCNC: 4.1 G/DL (ref 3.4–5)
ALP BLD-CCNC: 69 U/L (ref 40–129)
ALT SERPL-CCNC: 20 U/L (ref 10–40)
ANION GAP SERPL CALCULATED.3IONS-SCNC: 7 MMOL/L (ref 3–16)
AST SERPL-CCNC: 19 U/L (ref 15–37)
BILIRUB SERPL-MCNC: 0.3 MG/DL (ref 0–1)
BUN BLDV-MCNC: 13 MG/DL (ref 7–20)
CALCIUM SERPL-MCNC: 9.6 MG/DL (ref 8.3–10.6)
CHLORIDE BLD-SCNC: 92 MMOL/L (ref 99–110)
CO2: 32 MMOL/L (ref 21–32)
CREAT SERPL-MCNC: <0.5 MG/DL (ref 0.6–1.2)
GFR SERPL CREATININE-BSD FRML MDRD: >60 ML/MIN/{1.73_M2}
GLUCOSE BLD-MCNC: 116 MG/DL (ref 70–99)
GLUCOSE BLD-MCNC: 77 MG/DL (ref 70–99)
GLUCOSE BLD-MCNC: 87 MG/DL (ref 70–99)
PERFORMED ON: NORMAL
PERFORMED ON: NORMAL
POTASSIUM REFLEX MAGNESIUM: 3.8 MMOL/L (ref 3.5–5.1)
SODIUM BLD-SCNC: 131 MMOL/L (ref 136–145)
TOTAL PROTEIN: 6.8 G/DL (ref 6.4–8.2)

## 2023-03-07 PROCEDURE — G0378 HOSPITAL OBSERVATION PER HR: HCPCS

## 2023-03-07 PROCEDURE — 6370000000 HC RX 637 (ALT 250 FOR IP): Performed by: INTERNAL MEDICINE

## 2023-03-07 PROCEDURE — 80053 COMPREHEN METABOLIC PANEL: CPT

## 2023-03-07 PROCEDURE — 94640 AIRWAY INHALATION TREATMENT: CPT

## 2023-03-07 PROCEDURE — 71046 X-RAY EXAM CHEST 2 VIEWS: CPT

## 2023-03-07 PROCEDURE — 96372 THER/PROPH/DIAG INJ SC/IM: CPT

## 2023-03-07 PROCEDURE — 83036 HEMOGLOBIN GLYCOSYLATED A1C: CPT

## 2023-03-07 PROCEDURE — 6360000002 HC RX W HCPCS: Performed by: INTERNAL MEDICINE

## 2023-03-07 PROCEDURE — G0379 DIRECT REFER HOSPITAL OBSERV: HCPCS

## 2023-03-07 PROCEDURE — 94761 N-INVAS EAR/PLS OXIMETRY MLT: CPT

## 2023-03-07 PROCEDURE — 36415 COLL VENOUS BLD VENIPUNCTURE: CPT

## 2023-03-07 RX ORDER — MULTIVITAMIN WITH IRON
1 TABLET ORAL DAILY
Status: DISCONTINUED | OUTPATIENT
Start: 2023-03-07 | End: 2023-03-12 | Stop reason: HOSPADM

## 2023-03-07 RX ORDER — HYDROCHLOROTHIAZIDE 25 MG/1
12.5 TABLET ORAL DAILY
Status: DISCONTINUED | OUTPATIENT
Start: 2023-03-08 | End: 2023-03-12 | Stop reason: HOSPADM

## 2023-03-07 RX ORDER — DEXTROSE MONOHYDRATE 100 MG/ML
INJECTION, SOLUTION INTRAVENOUS CONTINUOUS PRN
Status: DISCONTINUED | OUTPATIENT
Start: 2023-03-07 | End: 2023-03-12 | Stop reason: HOSPADM

## 2023-03-07 RX ORDER — ATORVASTATIN CALCIUM 10 MG/1
10 TABLET, FILM COATED ORAL NIGHTLY
Status: DISCONTINUED | OUTPATIENT
Start: 2023-03-07 | End: 2023-03-12 | Stop reason: HOSPADM

## 2023-03-07 RX ORDER — VALSARTAN 160 MG/1
320 TABLET ORAL DAILY
Status: DISCONTINUED | OUTPATIENT
Start: 2023-03-08 | End: 2023-03-12 | Stop reason: HOSPADM

## 2023-03-07 RX ORDER — LANOLIN ALCOHOL/MO/W.PET/CERES
1000 CREAM (GRAM) TOPICAL DAILY
Status: DISCONTINUED | OUTPATIENT
Start: 2023-03-07 | End: 2023-03-12 | Stop reason: HOSPADM

## 2023-03-07 RX ORDER — HYDROCODONE BITARTRATE AND ACETAMINOPHEN 5; 325 MG/1; MG/1
1 TABLET ORAL EVERY 6 HOURS PRN
Status: DISCONTINUED | OUTPATIENT
Start: 2023-03-07 | End: 2023-03-12 | Stop reason: HOSPADM

## 2023-03-07 RX ORDER — CARBOXYMETHYLCELLULOSE SODIUM 10 MG/ML
1 GEL OPHTHALMIC NIGHTLY
Status: DISCONTINUED | OUTPATIENT
Start: 2023-03-07 | End: 2023-03-12 | Stop reason: HOSPADM

## 2023-03-07 RX ORDER — MONTELUKAST SODIUM 10 MG/1
10 TABLET ORAL DAILY
Status: DISCONTINUED | OUTPATIENT
Start: 2023-03-07 | End: 2023-03-12 | Stop reason: HOSPADM

## 2023-03-07 RX ORDER — FLUTICASONE PROPIONATE 50 MCG
1 SPRAY, SUSPENSION (ML) NASAL DAILY
Status: DISCONTINUED | OUTPATIENT
Start: 2023-03-08 | End: 2023-03-12 | Stop reason: HOSPADM

## 2023-03-07 RX ORDER — ASPIRIN 81 MG/1
81 TABLET ORAL DAILY
Status: DISCONTINUED | OUTPATIENT
Start: 2023-03-07 | End: 2023-03-12 | Stop reason: HOSPADM

## 2023-03-07 RX ORDER — CALCIUM CARBONATE-CHOLECALCIFEROL TAB 250 MG-125 UNIT 250-125 MG-UNIT
2 TAB ORAL DAILY
Status: DISCONTINUED | OUTPATIENT
Start: 2023-03-07 | End: 2023-03-12 | Stop reason: HOSPADM

## 2023-03-07 RX ORDER — ACETAMINOPHEN 325 MG/1
650 TABLET ORAL EVERY 4 HOURS PRN
Status: DISCONTINUED | OUTPATIENT
Start: 2023-03-07 | End: 2023-03-12 | Stop reason: HOSPADM

## 2023-03-07 RX ORDER — INSULIN LISPRO 100 [IU]/ML
0-8 INJECTION, SOLUTION INTRAVENOUS; SUBCUTANEOUS
Status: DISCONTINUED | OUTPATIENT
Start: 2023-03-07 | End: 2023-03-08

## 2023-03-07 RX ORDER — DOXEPIN HYDROCHLORIDE 25 MG/1
50 CAPSULE ORAL NIGHTLY
Status: DISCONTINUED | OUTPATIENT
Start: 2023-03-07 | End: 2023-03-12 | Stop reason: HOSPADM

## 2023-03-07 RX ORDER — VITAMIN B COMPLEX
2000 TABLET ORAL DAILY
Status: DISCONTINUED | OUTPATIENT
Start: 2023-03-07 | End: 2023-03-12 | Stop reason: HOSPADM

## 2023-03-07 RX ORDER — ENOXAPARIN SODIUM 100 MG/ML
30 INJECTION SUBCUTANEOUS DAILY
Status: DISCONTINUED | OUTPATIENT
Start: 2023-03-07 | End: 2023-03-12 | Stop reason: HOSPADM

## 2023-03-07 RX ORDER — AMLODIPINE BESYLATE 5 MG/1
10 TABLET ORAL DAILY
Status: DISCONTINUED | OUTPATIENT
Start: 2023-03-08 | End: 2023-03-12 | Stop reason: HOSPADM

## 2023-03-07 RX ORDER — BIOTIN 1 MG
1 TABLET ORAL DAILY
Status: DISCONTINUED | OUTPATIENT
Start: 2023-03-07 | End: 2023-03-07 | Stop reason: CLARIF

## 2023-03-07 RX ORDER — BENZONATATE 100 MG/1
100 CAPSULE ORAL 2 TIMES DAILY PRN
Status: DISCONTINUED | OUTPATIENT
Start: 2023-03-07 | End: 2023-03-12 | Stop reason: HOSPADM

## 2023-03-07 RX ORDER — METOPROLOL TARTRATE 50 MG/1
100 TABLET, FILM COATED ORAL 2 TIMES DAILY
Status: DISCONTINUED | OUTPATIENT
Start: 2023-03-07 | End: 2023-03-12 | Stop reason: HOSPADM

## 2023-03-07 RX ORDER — INSULIN LISPRO 100 [IU]/ML
0-4 INJECTION, SOLUTION INTRAVENOUS; SUBCUTANEOUS NIGHTLY
Status: DISCONTINUED | OUTPATIENT
Start: 2023-03-07 | End: 2023-03-08

## 2023-03-07 RX ORDER — ALBUTEROL SULFATE 90 UG/1
2 AEROSOL, METERED RESPIRATORY (INHALATION) EVERY 4 HOURS PRN
Status: DISCONTINUED | OUTPATIENT
Start: 2023-03-07 | End: 2023-03-12 | Stop reason: HOSPADM

## 2023-03-07 RX ORDER — VALSARTAN AND HYDROCHLOROTHIAZIDE 320; 12.5 MG/1; MG/1
1 TABLET, FILM COATED ORAL DAILY
Status: DISCONTINUED | OUTPATIENT
Start: 2023-03-07 | End: 2023-03-07 | Stop reason: CLARIF

## 2023-03-07 RX ORDER — ALOGLIPTIN 12.5 MG/1
12.5 TABLET, FILM COATED ORAL DAILY
Status: DISCONTINUED | OUTPATIENT
Start: 2023-03-07 | End: 2023-03-12 | Stop reason: HOSPADM

## 2023-03-07 RX ADMIN — CARBOXYMETHYLCELLULOSE SODIUM 1 DROP: 10 GEL OPHTHALMIC at 21:44

## 2023-03-07 RX ADMIN — METOPROLOL TARTRATE 100 MG: 50 TABLET ORAL at 21:44

## 2023-03-07 RX ADMIN — HYDROCODONE BITARTRATE AND ACETAMINOPHEN 1 TABLET: 5; 325 TABLET ORAL at 21:51

## 2023-03-07 RX ADMIN — DOXEPIN HYDROCHLORIDE 50 MG: 25 CAPSULE ORAL at 21:44

## 2023-03-07 RX ADMIN — ENOXAPARIN SODIUM 30 MG: 100 INJECTION SUBCUTANEOUS at 15:43

## 2023-03-07 RX ADMIN — MONTELUKAST SODIUM 10 MG: 10 TABLET, FILM COATED ORAL at 15:43

## 2023-03-07 RX ADMIN — Medication 2 PUFF: at 21:38

## 2023-03-07 RX ADMIN — Medication 2 PUFF: at 15:44

## 2023-03-07 RX ADMIN — ATORVASTATIN CALCIUM 10 MG: 10 TABLET, FILM COATED ORAL at 21:44

## 2023-03-07 RX ADMIN — TIOTROPIUM BROMIDE INHALATION SPRAY 2 PUFF: 3.12 SPRAY, METERED RESPIRATORY (INHALATION) at 21:43

## 2023-03-07 RX ADMIN — BENZONATATE 100 MG: 100 CAPSULE ORAL at 15:43

## 2023-03-07 ASSESSMENT — PAIN SCALES - GENERAL: PAINLEVEL_OUTOF10: 2

## 2023-03-07 NOTE — TELEPHONE ENCOUNTER
Spoke with daughter. I will ask Dr Manpreet Anaya about rescheduling patient for 3/14/23.  I will call the son back tomorrow for the time [phone]

## 2023-03-07 NOTE — PLAN OF CARE
[id number] [facility] Orthopedic Surgery  Plan of Care Note    Patient was transferred from [facility] to [facility] today for placement. Not able to have surgery today given her skin condition. Plan:  -No plans for surgery regarding the right hand for at least a week to let her skin heal.  -Would like to leave her hand open to air as much as possible during the day. -She is advised no use of the right hand including no range of motion or lifting, pushing, pulling.  -The splint at bedside is to be reapplied in the evening for her to sleep in with an Ace wrap.  -Please remove this in the morning and leave open to the air for majority the day.   -Would like to reapply the splint during the day if she is ambulating or getting out of bed  - elevate the hand  - Pain control    WIYL Roblero - CNP 3/7/2023 3:57 PM

## 2023-03-07 NOTE — RT PROTOCOL NOTE
RT Inhaler-Nebulizer Bronchodilator Protocol Note    There is a bronchodilator order in the chart from a provider indicating to follow the RT Bronchodilator Protocol and there is an Initiate RT Inhaler-Nebulizer Bronchodilator Protocol order as well (see protocol at bottom of note). CXR Findings:  No results found. The findings from the last RT Protocol Assessment were as follows:   History Pulmonary Disease: None or smoker <15 pack years  Respiratory Pattern: Regular pattern and RR 12-20 bpm  Breath Sounds: Clear breath sounds  Cough: Strong, spontaneous, non-productive  Indication for Bronchodilator Therapy:    Bronchodilator Assessment Score: 0    Aerosolized bronchodilator medication orders have been revised according to the RT Inhaler-Nebulizer Bronchodilator Protocol below. Respiratory Therapist to perform RT Therapy Protocol Assessment initially then follow the protocol. Repeat RT Therapy Protocol Assessment PRN for score 0-3 or on second treatment, BID, and PRN for scores above 3. No Indications - adjust the frequency to every 6 hours PRN wheezing or bronchospasm, if no treatments needed after 48 hours then discontinue using Per Protocol order mode. If indication present, adjust the RT bronchodilator orders based on the Bronchodilator Assessment Score as indicated below. Use Inhaler orders unless patient has one or more of the following: on home nebulizer, not able to hold breath for 10 seconds, is not alert and oriented, cannot activate and use MDI correctly, or respiratory rate 25 breaths per minute or more, then use the equivalent nebulizer order(s) with same Frequency and PRN reasons based on the score. If a patient is on this medication at home then do not decrease Frequency below that used at home.     0-3 - enter or revise RT bronchodilator order(s) to equivalent RT Bronchodilator order with Frequency of every 4 hours PRN for wheezing or increased work of breathing using Per Protocol order mode. 4-6 - enter or revise RT Bronchodilator order(s) to two equivalent RT bronchodilator orders with one order with BID Frequency and one order with Frequency of every 4 hours PRN wheezing or increased work of breathing using Per Protocol order mode. 7-10 - enter or revise RT Bronchodilator order(s) to two equivalent RT bronchodilator orders with one order with TID Frequency and one order with Frequency of every 4 hours PRN wheezing or increased work of breathing using Per Protocol order mode. 11-13 - enter or revise RT Bronchodilator order(s) to one equivalent RT bronchodilator order with QID Frequency and an Albuterol order with Frequency of every 4 hours PRN wheezing or increased work of breathing using Per Protocol order mode. Greater than 13 - enter or revise RT Bronchodilator order(s) to one equivalent RT bronchodilator order with every 4 hours Frequency and an Albuterol order with Frequency of every 2 hours PRN wheezing or increased work of breathing using Per Protocol order mode. RT to enter RT Home Evaluation for COPD & MDI Assessment order using Per Protocol order mode.     Electronically signed by Nae Abdalla RCP on 3/7/2023 at 3:35 PM

## 2023-03-07 NOTE — PROGRESS NOTES
Pt to room 4481 as direct admit. Splint and dressing noted to right hand to be clean dry and intact. Reviewed home medications with pt and was able to obtain admission info. Pt declined to change out of street clothes at this time. Did accept non slip socks. Reviewed call light usage and POC. Pt speaks conversational Hansel Naranjo but it is not her primary. Discussed that language line is available to her at all times if needed. Pt verbalized understanding but declined LL at this time. Orders entered remotely by Dr Joan Amaya. Family at bedside.

## 2023-03-07 NOTE — PROGRESS NOTES
Herbal and Nutritional Product Restrictions      The following herbal, alternative, and/or nutritional/dietary supplement product(s) has been discontinued per [facility] approved policy:    Biotin 3325 mcg daily    Please reorder upon discharge if appropriate.     Thank you,  Edna Gonzalez, [facility]  3/7/2023 2:24 PM

## 2023-03-07 NOTE — PROGRESS NOTES
03/07/23 1534   RT Protocol   History Pulmonary Disease 0   Respiratory pattern 0   Breath sounds 0   Cough 0   Bronchodilator Assessment Score 0

## 2023-03-07 NOTE — TELEPHONE ENCOUNTER
Other DAUGHTER CALLED IN TO RESCHEDULE PATIENT'S SURGERY PER DR ZAMBRANO FOR NEXT Tuesday. [phone]

## 2023-03-07 NOTE — PROGRESS NOTES
Hospital Problems             Last Modified POA    * (Principal) Closed multiple fractures of right hand bones with routine healing 3/7/2023 Yes    Reactive airway disease without complication 8/6/2162 Yes    Unable to care for self 3/7/2023 Yes    Pure hypercholesterolemia 3/7/2023 Yes    Frequent falls 3/7/2023 Yes    General weakness 3/7/2023 Yes    Hypertension 3/7/2023 Yes   She is observational stay needs SNF Placement after PT OT eval ASAP

## 2023-03-07 NOTE — ANESTHESIA PRE PROCEDURE
Department of Anesthesiology  Preprocedure Note       Name:  Faustino Holloway   Age:  80 y.o.  :  1935                                          MRN:  [id number]         Date:  3/7/2023      Surgeon: Monserrat Reese):  Mark Nix MD    Procedure: Procedure(s):  CLOSED REDUCTION AND PERCUTANEOUS PINNING OF RIGHT INDEX FINGER, MIDDLE FINGER, RING FINGER AND SMALL FINGER PROXIMAL PHALANX FRACTURES    Medications prior to admission:   Prior to Admission medications    Medication Sig Start Date End Date Taking?  Authorizing Provider   aspirin 81 MG EC tablet Take 81 mg by mouth daily   Yes Historical Provider, MD   Budeson-Glycopyrrol-Formoterol (BREZTRI AEROSPHERE) 160-9-4.8 MCG/ACT AERO Inhale 1 inhalation into the lungs in the morning and at bedtime 3/3/23 6/1/23  Veronika Carty MD   ezetimibe (ZETIA) 10 MG tablet Take 1 tablet by mouth daily 2/14/23 5/15/23  Veronika Carty MD   albuterol sulfate HFA (PROVENTIL;VENTOLIN;PROAIR) 108 (90 Base) MCG/ACT inhaler Inhale 2 puffs into the lungs every 4 hours as needed for Wheezing 2/14/23 3/16/23  Veronika Carty MD   valsartan-hydroCHLOROthiazide (DIOVAN-HCT) 320-12.5 MG per tablet Take 1 tablet by mouth daily 23   Veronika Carty MD   metoprolol (LOPRESSOR) 100 MG tablet Take 1 tablet by mouth 2 times daily 2/14/23 5/15/23  Veronika Carty MD   amLODIPine (NORVASC) 10 MG tablet Take 1 tablet by mouth daily 2/14/23 5/15/23  Veronika Carty MD   montelukast (SINGULAIR) 10 MG tablet Take 1 tablet by mouth daily 2/14/23 5/15/23  Veronika Carty MD   doxepin (SINEQUAN) 50 MG capsule Take 1 capsule by mouth nightly 23   Veronika Carty MD   atorvastatin (LIPITOR) 10 MG tablet Take 1 tablet by mouth daily 2/14/23 5/15/23  Veronika Carty MD   SITagliptin (JANUVIA) 100 MG tablet Take 1 tablet by mouth daily 23   Veronika Carty MD   benzonatate (TESSALON) 100 MG capsule Take 1 capsule by mouth 2 times daily as needed for Cough 2/14/23 5/15/23 Vanna Osborne MD   lipase-protease-amylase (CREON) 99063-24065 units delayed release capsule Take 1 capsule by mouth 3 times daily (with meals)  Patient taking differently: Take 12,000 Units by mouth daily 11/20/22 2/18/23  Vanna Osborne MD   fluticasone [facility]) 50 MCG/ACT nasal spray 1 spray by Each Nostril route daily 11/20/22 2/18/23  Vanna Osborne MD   Cholecalciferol (VITAMIN D3) 50 MCG (2000 UT) CAPS Take 1 capsule by mouth daily 11/20/22   Vanna Osborne MD   hydrocortisone 2.5 % cream Apply topically 2 times daily. Patient taking differently: as needed Apply topically 2 times daily prn 6/4/22   Vanna Osborne MD   Lancets MISC 1 each by Does not apply route 2 times daily 3/16/22   Vanna Osborne MD   sodium chloride (OCEAN, BABY AYR) 0.65 % nasal spray 1 spray by Nasal route every 4 hours as needed for Congestion 5/5/21   Vanna Osborne MD   Carboxymethylcellul-Glycerin (REFRESH OPTIVE) 0.5-0.9 % SOLN Apply 1 drop to eye nightly Both eyes nightly 4/30/21   Historical Provider, MD   Biotin 1000 MCG TABS Take 1 tablet by mouth daily    Historical Provider, MD   vitamin B-12 (CYANOCOBALAMIN) 1000 MCG tablet Take 1,000 mcg by mouth daily    Historical Provider, MD   blood glucose monitor kit and supplies Test 2 times a day & as needed for symptoms of irregular blood glucose. 10/28/19   Vanna Osborne MD   blood glucose monitor strips Test 1 times a day & as needed for symptoms of irregular blood glucose. 10/28/19   Vanna Osborne MD   Lancets MISC 1 each by Does not apply route 2 times daily 10/1/19   Vanna Osborne MD   Alcohol Swabs PADS 1 each by Does not apply route daily 9/28/19   Vanna Osborne MD   blood glucose test strips (ASCENSIA AUTODISC VI;ONE TOUCH ULTRA TEST VI) strip 1 each by In Vitro route daily As needed.  8/23/18   Vanna Osborne MD   calcium carbonate-vitamin D (CALCIUM 600 + D) 600-400 MG-UNIT TABS per tab Take 1 tablet by mouth daily  Patient taking differently: Take 1 tablet by mouth 2 times daily  11/10/17   Shilpa Mason MD   D[employer] (POLIDENT 3 MINUTE) TBEF 1 tablet by Does not apply route daily 8/7/17   Shilpa Mason MD   Multiple Vitamin (MULTIVITAMIN PO) Take 1 tablet by mouth daily. Historical Provider, MD       Current medications:    Current Facility-Administered Medications   Medication Dose Route Frequency Provider Last Rate Last Admin    ceFAZolin (ANCEF) 2000 mg in 0.9% sodium chloride 100 mL IVPB  2,000 mg IntraVENous On Call to Michi Reddy MD        lactated ringers IV soln infusion   IntraVENous Continuous Amber Todd MD        sodium chloride flush 0.9 % injection 5-40 mL  5-40 mL IntraVENous 2 times per day Amber Todd MD        sodium chloride flush 0.9 % injection 5-40 mL  5-40 mL IntraVENous PRN Amber Todd MD        0.9 % sodium chloride infusion   IntraVENous PRN Amber Todd MD           Allergies: Allergies   Allergen Reactions    Percocet [Oxycodone-Acetaminophen] Rash     Not sure but thinks it was this drug       Problem List:    Patient Active Problem List   Diagnosis Code    Osteoarthritis of left knee M17.12    Controlled type 2 diabetes mellitus without complication, without long-term current use of insulin ([facility]) E11.9    ASCVD (arteriosclerotic cardiovascular disease) I25.10    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 2XXA    Right rotator cuff tear arthropathy M75.101, M12.811    Fall from slip, trip, or stumble, initial encounter W01. 0XXA    Irritable bowel syndrome with diarrhea K58.0    Nocturnal cough with wheeze R05.8, R06.2    Exocrine pancreatic insufficiency K86.81    Closed fracture of multiple sites of phalanges of hand S62.609A       Past Medical History:        Diagnosis Date    Arthritis     Diabetes mellitus ([address]     HYPERCHOLESTERAEMIA     Hypertension     TIA (transient ischaemic attack) 2003       Past Surgical History:        Procedure Laterality Date    COLECTOMY      JOINT REPLACEMENT  04/22/2010    left knee    PATELLA FRACTURE SURGERY Left 5/2/2021    PATELLA OPEN REDUCTION INTERNAL FIXATION performed by Korey Gomez MD at [address] [place] [address]  8/19/2010    right, cemented       Social History:    Social History     Tobacco Use    Smoking status: Never    Smokeless tobacco: Never   Substance Use Topics    Alcohol use: No                                Counseling given: Not Answered      Vital Signs (Current):   Vitals:    03/02/23 1113   Weight: 110 lb (49.9 kg)   Height: 4' 10\" (1.473 m)                                              BP Readings from Last 3 Encounters:   03/03/23 132/84   02/22/23 134/78   12/02/22 (!) 190/84       NPO Status:                                                                                 BMI:   Wt Readings from Last 3 Encounters:   03/02/23 110 lb (49.9 kg)   03/03/23 112 lb (50.8 kg)   03/01/23 114 lb (51.7 kg)     Body mass index is 22.99 kg/m².     CBC:   Lab Results   Component Value Date/Time    WBC 7.5 09/13/2022 01:28 PM    RBC 4.54 09/13/2022 01:28 PM    HGB 13.5 09/13/2022 01:28 PM    HCT 39.9 09/13/2022 01:28 PM    MCV 87.9 09/13/2022 01:28 PM    RDW 14.6 09/13/2022 01:28 PM     09/13/2022 01:28 PM       CMP:   Lab Results   Component Value Date/Time     09/13/2022 01:28 PM    K 4.0 09/13/2022 01:28 PM    K 3.9 05/01/2021 05:02 AM    CL 90 09/13/2022 01:28 PM    CO2 30 09/13/2022 01:28 PM    BUN 13 09/13/2022 01:28 PM    CREATININE 0.5 09/13/2022 01:28 PM    GFRAA >60 09/13/2022 01:28 PM    GFRAA >60 08/20/2010 04:26 AM    AGRATIO 2.0 09/13/2022 01:28 PM    LABGLOM >60 09/13/2022 01:28 PM    GLUCOSE 104 09/13/2022 01:28 PM    PROT 6.6 09/13/2022 01:28 PM    PROT 7.1 05/17/2010 10:43 AM    CALCIUM 9.5 09/13/2022 01:28 PM    BILITOT <0.2 09/13/2022 01:28 PM    ALKPHOS 62 09/13/2022 01:28 PM    AST 18 09/13/2022 01:28 PM    ALT 17 09/13/2022 01:28 PM       POC Tests: No results for input(s): POCGLU, POCNA, POCK, POCCL, POCBUN, POCHEMO, POCHCT in the last 72 hours. Coags:   Lab Results   Component Value Date/Time    PROTIME 11.9 04/30/2021 10:40 PM    INR 1.03 04/30/2021 10:40 PM    APTT 33.1 05/02/2021 06:03 AM       HCG (If Applicable): No results found for: PREGTESTUR, PREGSERUM, HCG, HCGQUANT     ABGs: No results found for: PHART, PO2ART, LDU1LUE, XLO7SQK, BEART, H5KUDDUZ     Type & Screen (If Applicable):  Lab Results   Component Value Date    LABABO A 08/19/2010    [address] Positive 08/19/2010       Drug/Infectious Status (If Applicable):  No results found for: HIV, HEPCAB    COVID-19 Screening (If Applicable):   Lab Results   Component Value Date/Time    COVID19 Not Detected 05/06/2021 12:45 PM           Anesthesia Evaluation  Patient summary reviewed and Nursing notes reviewed  Airway: Mallampati: II  TM distance: >3 FB   Neck ROM: full  Mouth opening: > = 3 FB   Dental:    (+) upper dentures      Pulmonary:normal exam                               Cardiovascular:    (+) hypertension:,       ECG reviewed      Echocardiogram reviewed                  Neuro/Psych:   (+) neuromuscular disease:, TIA,             GI/Hepatic/Renal:             Endo/Other:    (+) DiabetesType II DM, , .                 Abdominal:             Vascular: Other Findings:           Anesthesia Plan      general     ASA 3       Induction: intravenous. MIPS: Postoperative opioids intended. Anesthetic plan and risks discussed with patient. Plan discussed with CRNA.     Attending anesthesiologist reviewed and agrees with Shelbi Maguire MD 3/7/2023

## 2023-03-08 LAB
ESTIMATED AVERAGE GLUCOSE: 139.9 MG/DL
GLUCOSE BLD-MCNC: 132 MG/DL (ref 70–99)
GLUCOSE BLD-MCNC: 146 MG/DL (ref 70–99)
HBA1C MFR BLD: 6.5 %
HCT VFR BLD CALC: 38.1 % (ref 36–48)
HEMOGLOBIN: 12.6 G/DL (ref 12–16)
MCH RBC QN AUTO: 29.4 PG (ref 26–34)
MCHC RBC AUTO-ENTMCNC: 33 G/DL (ref 31–36)
MCV RBC AUTO: 89.1 FL (ref 80–100)
PDW BLD-RTO: 14.1 % (ref 12.4–15.4)
PERFORMED ON: ABNORMAL
PERFORMED ON: ABNORMAL
PLATELET # BLD: 295 K/UL (ref 135–450)
PMV BLD AUTO: 7.3 FL (ref 5–10.5)
RBC # BLD: 4.27 M/UL (ref 4–5.2)
WBC # BLD: 8.9 K/UL (ref 4–11)

## 2023-03-08 PROCEDURE — G0378 HOSPITAL OBSERVATION PER HR: HCPCS

## 2023-03-08 PROCEDURE — 97116 GAIT TRAINING THERAPY: CPT

## 2023-03-08 PROCEDURE — 85027 COMPLETE CBC AUTOMATED: CPT

## 2023-03-08 PROCEDURE — 97165 OT EVAL LOW COMPLEX 30 MIN: CPT

## 2023-03-08 PROCEDURE — 94640 AIRWAY INHALATION TREATMENT: CPT

## 2023-03-08 PROCEDURE — 36415 COLL VENOUS BLD VENIPUNCTURE: CPT

## 2023-03-08 PROCEDURE — 6370000000 HC RX 637 (ALT 250 FOR IP): Performed by: INTERNAL MEDICINE

## 2023-03-08 PROCEDURE — 96372 THER/PROPH/DIAG INJ SC/IM: CPT

## 2023-03-08 PROCEDURE — 6360000002 HC RX W HCPCS: Performed by: INTERNAL MEDICINE

## 2023-03-08 PROCEDURE — 94761 N-INVAS EAR/PLS OXIMETRY MLT: CPT

## 2023-03-08 PROCEDURE — 97161 PT EVAL LOW COMPLEX 20 MIN: CPT

## 2023-03-08 PROCEDURE — 97530 THERAPEUTIC ACTIVITIES: CPT

## 2023-03-08 PROCEDURE — 97535 SELF CARE MNGMENT TRAINING: CPT

## 2023-03-08 RX ORDER — HYDROCODONE BITARTRATE AND ACETAMINOPHEN 5; 325 MG/1; MG/1
1 TABLET ORAL EVERY 6 HOURS PRN
Qty: 20 TABLET | Refills: 0 | Status: SHIPPED | OUTPATIENT
Start: 2023-03-08 | End: 2023-03-08 | Stop reason: SDUPTHER

## 2023-03-08 RX ORDER — HYDROCODONE BITARTRATE AND ACETAMINOPHEN 5; 325 MG/1; MG/1
1 TABLET ORAL EVERY 6 HOURS PRN
Qty: 28 TABLET | Refills: 0 | Status: SHIPPED | OUTPATIENT
Start: 2023-03-08 | End: 2023-03-15

## 2023-03-08 RX ORDER — ENOXAPARIN SODIUM 100 MG/ML
30 INJECTION SUBCUTANEOUS DAILY
Qty: 3 ML | Refills: 0 | Status: SHIPPED | OUTPATIENT
Start: 2023-03-08

## 2023-03-08 RX ADMIN — METOPROLOL TARTRATE 100 MG: 50 TABLET ORAL at 21:21

## 2023-03-08 RX ADMIN — CYANOCOBALAMIN TAB 1000 MCG 1000 MCG: 1000 TAB at 09:41

## 2023-03-08 RX ADMIN — HYDROCHLOROTHIAZIDE 12.5 MG: 25 TABLET ORAL at 09:42

## 2023-03-08 RX ADMIN — ALOGLIPTIN 12.5 MG: 12.5 TABLET, FILM COATED ORAL at 09:40

## 2023-03-08 RX ADMIN — DOXEPIN HYDROCHLORIDE 50 MG: 25 CAPSULE ORAL at 21:21

## 2023-03-08 RX ADMIN — MONTELUKAST SODIUM 10 MG: 10 TABLET, FILM COATED ORAL at 09:40

## 2023-03-08 RX ADMIN — ENOXAPARIN SODIUM 30 MG: 100 INJECTION SUBCUTANEOUS at 09:42

## 2023-03-08 RX ADMIN — TIOTROPIUM BROMIDE INHALATION SPRAY 2 PUFF: 3.12 SPRAY, METERED RESPIRATORY (INHALATION) at 21:35

## 2023-03-08 RX ADMIN — Medication 2000 UNITS: at 09:40

## 2023-03-08 RX ADMIN — FLUTICASONE PROPIONATE 1 SPRAY: 50 SPRAY, METERED NASAL at 09:42

## 2023-03-08 RX ADMIN — Medication 2 PUFF: at 07:55

## 2023-03-08 RX ADMIN — CARBOXYMETHYLCELLULOSE SODIUM 1 DROP: 10 GEL OPHTHALMIC at 21:21

## 2023-03-08 RX ADMIN — ATORVASTATIN CALCIUM 10 MG: 10 TABLET, FILM COATED ORAL at 21:21

## 2023-03-08 RX ADMIN — ASPIRIN 81 MG: 81 TABLET, COATED ORAL at 09:40

## 2023-03-08 RX ADMIN — BENZONATATE 100 MG: 100 CAPSULE ORAL at 17:23

## 2023-03-08 RX ADMIN — Medication 2 PUFF: at 21:33

## 2023-03-08 RX ADMIN — AMLODIPINE BESYLATE 10 MG: 5 TABLET ORAL at 09:41

## 2023-03-08 RX ADMIN — HYDROCODONE BITARTRATE AND ACETAMINOPHEN 1 TABLET: 5; 325 TABLET ORAL at 17:23

## 2023-03-08 RX ADMIN — VALSARTAN 320 MG: 160 TABLET, FILM COATED ORAL at 09:40

## 2023-03-08 RX ADMIN — METOPROLOL TARTRATE 100 MG: 50 TABLET ORAL at 09:40

## 2023-03-08 RX ADMIN — THERA TABS 1 TABLET: TAB at 09:45

## 2023-03-08 RX ADMIN — Medication 2 TABLET: at 09:39

## 2023-03-08 ASSESSMENT — PAIN SCALES - GENERAL
PAINLEVEL_OUTOF10: 2
PAINLEVEL_OUTOF10: 7

## 2023-03-08 ASSESSMENT — PAIN - FUNCTIONAL ASSESSMENT: PAIN_FUNCTIONAL_ASSESSMENT: ACTIVITIES ARE NOT PREVENTED

## 2023-03-08 ASSESSMENT — PAIN DESCRIPTION - LOCATION: LOCATION: HAND

## 2023-03-08 ASSESSMENT — PAIN DESCRIPTION - DESCRIPTORS: DESCRIPTORS: ACHING

## 2023-03-08 ASSESSMENT — PAIN DESCRIPTION - ORIENTATION: ORIENTATION: RIGHT

## 2023-03-08 NOTE — PROGRESS NOTES
Hospital Problems             Last Modified POA    * (Principal) Closed multiple fractures of right hand bones with routine healing 3/7/2023 Yes    Reactive airway disease without complication 2/9/1517 Yes    Unable to care for self 3/7/2023 Yes    Pure hypercholesterolemia 3/7/2023 Yes    Frequent falls 3/7/2023 Yes    General weakness 3/7/2023 Yes    Hypertension 3/7/2023 Yes   H&P dictated

## 2023-03-08 NOTE — CARE COORDINATION
Discharge Planning     The patient has been accepted at [facility]. The patient will need a Pre-Cert started. The SW at this time is waiting on a Physical therapy note. Once PT has seen the patient Pre-cert can be submitted.

## 2023-03-08 NOTE — PROGRESS NOTES
Morning assessment completed. Pt comfortably resting in chair. VSS. Morning meds given per MAR. The care plan and education has been reviewed and mutually agreed upon with the patient.

## 2023-03-08 NOTE — PLAN OF CARE
Problem: Chronic Conditions and Co-morbidities  Goal: Patient's chronic conditions and co-morbidity symptoms are monitored and maintained or improved  Outcome: Progressing     Problem: Discharge Planning  Goal: Discharge to home or other facility with appropriate resources  Outcome: Progressing  Flowsheets (Taken 3/7/2023 1417 by Maryuri Rosa RN)  Discharge to home or other facility with appropriate resources:   Identify barriers to discharge with patient and caregiver   Arrange for needed discharge resources and transportation as appropriate     Problem: Safety - Adult  Goal: Free from fall injury  Outcome: Progressing     Problem: ABCDS Injury Assessment  Goal: Absence of physical injury  Outcome: Progressing     Problem: Pain  Goal: Verbalizes/displays adequate comfort level or baseline comfort level  Outcome: Progressing

## 2023-03-08 NOTE — PROGRESS NOTES
Physical Therapy    [facility] [address] Department   Phone: [phone]    Physical Therapy    [x] Initial Evaluation            [] Daily Treatment Note         [] Discharge Summary      Patient: Gustavo Machuca   : 1935   MRN: [id number]   Date of Service:  3/8/2023  Admitting Diagnosis: Closed multiple fractures of right hand bones with routine healing  Current Admission Summary: The patient is an 80-year-old  [place] ([facility])  woman who was directly admitted to [facility] at my  intervention, has recently had multiple fractures in the right dominant  hand. She lives by herself and she is getting around with the help of a walker. She was supposed to have percutaneous pinning and closedreduction of these fractures under anesthesia because of the condition that looked very soggy because of recent exposure to moisture. Dr. Jeanine Fox has postponed the surgery by one week  Past Medical History:  has a past medical history of Arthritis, Diabetes mellitus ([address] HYPERCHOLESTERAEMIA, Hypertension, and TIA (transient ischaemic attack). Past Surgical History:  has a past surgical history that includes colectomy; joint replacement (2010); Total knee arthroplasty (2010); and Patella fracture surgery (Left, 2021). Discharge Recommendations: Gustavo Machuca scored a 18/24 on the AM-PAC short mobility form. Current research shows that an AM-PAC score of 17 or less is typically not associated with a discharge to the patient's home setting. Based on the patient's AM-PAC score and their current functional mobility deficits, it is recommended that the patient have 3-5 sessions per week of Physical Therapy at d/c to increase the patient's independence. Please see assessment section for further patient specific details. If patient discharges prior to next session this note will serve as a discharge summary.   Please see below for the latest assessment towards goals.    Patient unsafe to return home without supervision, recommend SNF placement to address noted impairments below  DME Required For Discharge: DME to be determined at next level of care  Precautions/Restrictions: high fall risk, weight bearing, ROM restrictions  Weight Bearing Restrictions: non weight bearing  [x] Right Upper Extremity  [] Left Upper Extremity [] Right Lower Extremity  [] Left Lower Extremity     Required Braces/Orthotics:  R hand/wrist splint   [x] Right  [] Left  Positional Restrictions:no positional restrictions    Pre-Admission Information   Lives With: alone                     Type of Home: apartment  Home Layout: one level  Home Access: elevator  Bathroom Layout: walk in shower  Bathroom Equipment: grab bars in shower, grab bars around toilet, shower chair  Toilet Height: standard height  Home Equipment: rolling walker  Transfer Assistance: modified independent with use of R walker  Ambulation Assistance:modified independent with use of R walker  ADL Assistance: independent with all ADL's  IADL Assistance: requires assistance with driving/transportation, requires assistance with shopping  Active :        [] Yes                 [x] No  Hand Dominance: [] Left                 [x] Right  Current Employment: retired.  Occupation: homemaker  Hobbies: cooking  Recent Falls: one fall reported leading to this admission    Examination   Vision:   Vision Gross Assessment: Impaired and Vision Corrective Device: wears glasses at all times  Hearing:   hard of hearing, no hearing aid  Posture:   WFL  Sensation:   WFL  Proprioception:    WFL  Tone:   Normotonic  Coordination Testing:   WFL    ROM:   (B) LE AROM WFL  Strength:   (B) LE strength grossly WFL  Therapist Clinical Decision Making (Complexity): low complexity  Clinical Presentation: stable      Subjective  General: Patient lying semi-reclined in bed with HOB elevated upon arrival. Patient is agreeable to PT. Patient's son present.  Pain: Patient does not rate upon questioning; R hand  Pain Interventions: RN notified       Functional Mobility  Bed Mobility  Supine to Sit: stand by assistance  Sit to Supine: stand by assistance  Scooting: stand by assistance  Comments: Patient completed bed mobility with bed slightly elevated  Transfers  Sit to stand transfer: stand by assistance  Stand to sit transfer: stand by assistance  Toilet transfer: stand by assistance  Comments: Patient completed STS from EOB, recliner chair, and toilet with RW placed in front  Ambulation  Surface:level surface  Assistive Device: platform walker (R)  Assistance: stand by assistance, contact guard assistance  Distance: 100' + 100'  Gait Mechanics: Decreased mora, unsteady with turns, decreased step height/length  Comments: One LOB, self corrected by patient at [address]  Number of Steps: 4  Step Height: 6 inch  Hand Rails: (L) ascending handrail  Assistance: contact guard assistance  Comments:  Wheelchair Mobility:  No w/c mobility completed on this date.   Comments:  Balance  Static Sitting Balance: fair (+): maintains balance at SBA/supervision without use of UE support  Dynamic Sitting Balance: fair (+): maintains balance at SBA/supervision without use of UE support  Static Standing Balance: fair (-): maintains balance at CGA with use of UE support  Dynamic Standing Balance: fair: maintains balance at CGA without use of UE support  Comments:    Other Therapeutic Interventions  Repositioned in recliner chair   Functional Outcomes  AM-PAC Inpatient Mobility Raw Score : 18              Cognition  WFL  Orientation:    alert and oriented x 4  Command Following:   [facility]    Education  Barriers To Learning: none  Patient Education: patient educated on goals, PT role and benefits, plan of care, precautions, weight-bearing education, general safety, functional mobility training, pressure relief, transfer training, discharge recommendations  Learning Assessment: patient verbalizes and demonstrates understanding    Assessment  Activity Tolerance: Fair; limited by NWB status of R UE  Impairments Requiring Therapeutic Intervention: decreased functional mobility, decreased ROM, decreased strength, decreased endurance, decreased balance, increased pain  Prognosis: good  Clinical Assessment: Patient is 79 y/o female presenting to [facility] secondary to multiple fx of R hand. Patient currently presents below baseline function with all functional mobility. Patient's PLOF includes being mod I with use of RW. At this time, patient requires use of R platform walker with transfers and ambulation at [employer]. Patient demonstrated one LOB this date, self-corrected by patient with CGA. Recommend patient d/c to SNF due to no supervision at home resulting in fall risk for patient with NWB status of R hand.  Patient will continue to benefit from skilled PT in order to return to PLOF with all functional mobility prior to d/c from hospital.   Safety Interventions: patient left in chair, chair alarm in place, call light within reach, patient at risk for falls, nurse notified, and family/caregiver present    Plan  Frequency: 3-5 x/per week  Current Treatment Recommendations: strengthening, ROM, balance training, functional mobility training, endurance training, patient/caregiver education, and home exercise program    Goals  Patient Goals: Return home   Short Term Goals:  Time Frame: Upon d/c  Patient will complete bed mobility at [facility] independent   Patient will complete transfers at [facility] independent   Patient will ambulate 200 ft with use of platform walker (R) at modified independent    Therapy Session Time      Individual Group Co-treatment   Time In 1507       Time Out 1545       Minutes 38         Timed Code Treatment Minutes:  23 Minutes  Total Treatment Minutes:  38 Minutes       Electronically Signed By: Jose White, PT  Jose White PT, DPT, [id number]

## 2023-03-08 NOTE — CARE COORDINATION
SW started pt's precert with [facility] to [facility].  Dizko Samurai Auth ID is [id number].  Current status is \"Pending.\"    Electronically signed by ANGELES Cain, JANIYA on 3/8/2023 at 5:39 PM

## 2023-03-08 NOTE — CARE COORDINATION
Case Management Assessment  Initial Evaluation    Date/Time of Evaluation: 3/8/2023 11:26 AM  Assessment Completed by: ANGELES Medina    If patient is discharged prior to next notation, then this note serves as note for discharge by case management. Patient Name: Butch Dietrich                   YOB: 1935  Diagnosis: Closed multiple fractures of right hand bones with routine healing [S62.91XD]                   Date / Time: 3/7/2023  1:10 PM    Patient Admission Status: Observation   Readmission Risk (Low < 19, Mod (19-27), High > 27): No data recorded  Current PCP: Tavares Tejada MD  PCP verified by CM? Yes    Chart Reviewed: Yes      History Provided by: Patient  Patient Orientation: Alert and Oriented    Patient Cognition: Alert    Hospitalization in the last 30 days (Readmission):  No    If yes, Readmission Assessment in  Navigator will be completed. Advance Directives:      Code Status: Full Code   Patient's Primary Decision Maker is: Legal Next of Kin      Discharge Planning:    Patient lives with: Alone Type of Home: Apartment, Independent Living (Lives at The [address] [place] [address])  [facility]: [facility]  Patient Support Systems include: Family Members, Friends/Neighbors   Current Financial resources: Medicare  Current community resources: ECF/Home Care (Active with Juana Shoemaker a nurse)  Current services prior to admission: Durable Medical Equipment            Current DME: Althea Flores Chair            Type of Home Care services:  Nursing Services    ADLS  Prior functional level: Independent in ADLs/IADLs  Current functional level:  (PT/OT pending.)    PT AM-PAC:   /24  OT AM-PAC:   /24    Family can provide assistance at DC: Yes  Would you like Case Management to discuss the discharge plan with any other family members/significant others, and if so, who?  Yes (desiree- Child)  Plans to Return to Present Housing: Unknown at present  Other Identified Issues/Barriers to RETURNING to current housing:   Potential Assistance needed at discharge: 1 Dino Choi            Potential DME:    Patient expects to discharge to: Dino Jamaal (The patietnt requested a referral be faxed to [facility] and [facility].)  Plan for transportation at discharge:  (TBD)    Financial    Payor: Yessenia Arciniega / Plan: Rosalin Bence / Product Type: *No Product type* /     Does insurance require precert for SNF: Yes    Potential assistance Purchasing Medications: No  Meds-to-Beds request:        [facility] [id number] - [address] [facility], [address] [place] [address] [phone]  [address] [facility] [address]  Phone: [phone] Fax: [phone]    [facility] [id number] - [place] [address] [facility] [phone] Saskia Arias [phone]  [address] [place] [address]  Phone: [phone] Fax: [phone]    [id number]- [address]ney Min - [address] [phone] Saskia Arias [phone]  [address] [place] [address]  Phone: [phone] Fax: [address] [phone] - f [phone]  [address] [facility] [address]  Phone: [phone] Fax: [phone]    [facility] ([facility]) - Nael Jacqueshuamanda [address] [facility] [phone] - f [phone]  [facility] [address] [address]  Phone: [phone] Fax: [phone]    [facility] ([facility] ) - Shannan Shen [address] [phone] - [address] [phone]  [address] [place] [place],[place] [place] [address]  Phone: [phone] Fax: [phone]    formerly [facility] [address] [place] [address] [phone] Saskia Arias [phone]  [address]  [facility] [address]  Phone: [phone] Fax: [phone]      Notes:    Factors facilitating achievement of predicted outcomes: Family support, Motivated, Pleasant, Good insight into deficits, and Knowledge about rehab    Barriers to discharge: Decreased endurance    Additional Case Management Notes: The SW spoke with the patient this date. The patient informed the SW that the she is active with [facility] and the SW confirmed the information and the patient receives a nurse. The patient is also receives services through [facility], the patient gets 3 hours a week from a Aide and has a emergency response system. The patient expressed needing to go to a SNF at discharge and would like referrals faxed to the [facility] SNF'S. [facility]-Waiting for a response. [facility]- Waiting for a response. The Plan for Transition of Care is related to the following treatment goals of Closed multiple fractures of right hand bones with routine healing [L72.12ND]    IF APPLICABLE: The Patient and/or patient representative Miguel Lilly and her family were provided with a choice of provider and agrees with the discharge plan. Freedom of choice list with basic dialogue that supports the patient's individualized plan of care/goals and shares the quality data associated with the providers was provided to:     Patient Representative Name:       The Patient and/or Patient Representative Agree with the Discharge Plan?       ANGELES Bains  Case Management Department  Ph: [phone] Fax: [phone]

## 2023-03-08 NOTE — PROGRESS NOTES
Shift assessment complete, patient is alert and oriented X4, VSS, ambulated to bathroom, BM X1 large soft ouput. Splint with ace wrap to right hand at bedtime, remove in the morning, patient in bed resting and sleeping. All safety precaution in place. The care plan and education has been reviewed and mutually agreed upon with the patient.

## 2023-03-08 NOTE — PROGRESS NOTES
[facility] Department   Phone: [phone]    Occupational Therapy    [x] Initial Evaluation            [] Daily Treatment Note         [] Discharge Summary      Patient: Jadon Deluna   : 1935   MRN: [id number]   Date of Service:  3/8/2023    Admitting Diagnosis:  Closed multiple fractures of right hand bones with routine healing  Current Admission Summary: The patient is an 14-year-old  [place] ([facility])  woman who was directly admitted to [facility] at my  intervention, has recently had multiple fractures in the right dominant  hand. She lives by herself and she is getting around with the help of a walker. She was supposed to have percutaneous pinning and closedreduction of these fractures under anesthesia because of the condition that looked very soggy because of recent exposure to moisture. Dr. Valarie Encarnacion has postponed the surgery by one week  Past Medical History:  has a past medical history of Arthritis, Diabetes mellitus ([address] HYPERCHOLESTERAEMIA, Hypertension, and TIA (transient ischaemic attack). Past Surgical History:  has a past surgical history that includes colectomy; joint replacement (2010); Total knee arthroplasty (2010); and Patella fracture surgery (Left, 2021). Discharge Recommendations: Jadon Deluna scored a 14/24 on the AM-PAC ADL Inpatient form. Current research shows that an AM-PAC score of 17 or less is typically not associated with a discharge to the patient's home setting. Based on the patient's AM-PAC score and their current ADL deficits, it is recommended that the patient have 3-5 sessions per week of Occupational Therapy at d/c to increase the patient's independence. Please see assessment section for further patient specific details. If patient discharges prior to next session this note will serve as a discharge summary. Please see below for the latest assessment towards goals.       DME Required For Discharge: DME to be determined at next level of care    Precautions/Restrictions: high fall risk, weight bearing, ROM restrictions  Weight Bearing Restrictions: non weight bearing   [x] Right hand and wrist  [] Left Upper Extremity [] Right Lower Extremity  [] Left Lower Extremity     Required Braces/Orthotics:  R hand / wrist splint    [x] Right  [] Left  Positional Restrictions:no positional restrictions    Pre-Admission Information   Lives With: alone    Type of Home: apartment  Home Layout: one level  Home Access: elevator  Bathroom Layout: walk in shower  Bathroom Equipment: grab bars in shower, grab bars around toilet, shower chair  Toilet Height: standard height  Home Equipment: rolling walker  Transfer Assistance: modified independent with use of R walker  Ambulation Assistance:modified independent with use of R walker  ADL Assistance: independent with all ADL's  IADL Assistance: requires assistance with driving/transportation, requires assistance with shopping  Active :        [] Yes  [x] No  Hand Dominance: [] Left  [x] Right  Current Employment: retired.   Occupation: homemaker  Hobbies: cooking  Recent Falls: one fall reported leading to this admission    Examination   Vision:   Vision Corrective Device: wears glasses at all times  Hearing:   hard of hearing, no hearing aid      ROM:   (B) UE AROM WFL  Right hand and wrist immobilized in splint  Strength:   (B) UE strength grossly WFL  Right hand and wrist immobilized    Therapist Clinical Decision Making (Complexity): low complexity  Clinical Presentation: stable      Subjective  General: Pt received seated in bed agreeable to OT Evaluation  Pain: 0/10  Pain Interventions: not applicable        Activities of Daily Living  Basic Activities of Daily Living  Feeding: setup assistance stand by assistance  Feeding Comments: Pt right hand dominant, requires assist with packages, cutting etc   Grooming: minimal assistance  Grooming Comments: pt requires assist with bilateral tasks  Upper Extremity Dressing: maximum assistance  Lower Extremity Dressing: maximum assistance  Dressing Comments: Pt  changed robe, changed into sweater and pants. With difficulty managing Right side of clothing  Toileting: moderate assistance. Toileting Comments: assist with clothing management pre and post tioleting due to Right hand immobilization  Instrumental Activities of Daily Living  No IADL completed on this date. Functional Mobility  Bed Mobility  Supine to Sit: stand by assistance, contact guard assistance  Comments:  Transfers  Sit to stand transfer:contact guard assistance  Stand to sit transfer: contact guard assistance  Stand step transfer: contact guard assistance, minimal assistance  Bed / Chair transfer: contact guard assistance, minimal assistance. Toilet transfer: contact guard assistance, minimal assistance  Toilet transfer equipment: standard toilet  Comments:    Functional Mobility:  Sitting Balance: supervision. Standing Balance: contact guard assistance.     Functional Mobility: .  contact guard assistance, minimal assistance  Functional Mobility Activity: to/from bathroom  Functional Mobility Device Use: platform walker (R)  Functional Mobility Comment: min cues for pt to step into walker for increased stability    Other Therapeutic Interventions    Functional Outcomes  AM-PAC Inpatient Daily Activity Raw Score: 14    Cognition  WFL  Orientation:    alert and oriented x 4  Command Following:   [facility]     Education  Barriers To Learning: none  Patient Education: patient educated on goals, OT role and benefits, plan of care, discharge recommendations  Learning Assessment:  patient verbalizes understanding, would benefit from continued reinforcement    Assessment  Activity Tolerance: Pt with fair activity tolerance   Impairments Requiring Therapeutic Intervention: decreased functional mobility, decreased ADL status, decreased endurance, decreased balance  Prognosis: good  Clinical Assessment: 80year old female functioning below baseline level. Pt would benefit from skilled OT to increase activity tolerance and safety with ADLs as well as training on adaptive techniques to maximize pt level of independence.   Safety Interventions: patient left in chair, chair alarm in place, call light within reach, patient at risk for falls, nurse notified, and family/caregiver present    Plan  Frequency: 1-2 x/per week  (pt observation status)  Current Treatment Recommendations: balance training, functional mobility training, transfer training, endurance training, neuromuscular re-education, patient/caregiver education, ADL/self-care training, and home management training    Goals  Patient Goals: get better   Short Term Goals:  Time Frame: discharge  Patient will complete upper body ADL at contact guard assistance, minimal assistance   Patient will complete lower body ADL at contact guard assistance, minimal assistance   Patient will complete toileting at contact guard assistance, minimal assistance   Patient will complete functional transfers at supervision, stand by assistance   Patient will complete functional mobility at supervision, stand by assistance     Therapy Session Time     Individual Group Co-treatment   Time In 1144      Time Out 1233      Minutes 49           Timed Code Treatment Minutes:   34  Total Treatment Minutes:  49       Electronically Signed By: Rebekah Mosley, [address], [address] [facility]

## 2023-03-08 NOTE — DISCHARGE INSTR - COC
Continuity of Care Form    Patient Name: Butch Dietrich   :  1935  MRN:  [id number]    Admit date:  3/7/2023  Discharge date:  2023    Code Status Order: Full Code   Advance Directives:     Admitting Physician:  Tavares Tejada MD  PCP: Tavares Tejada MD    Discharging Nurse: [facility]/Room#: 9XV-6039/6289-77  Discharging Unit Phone Number: ***    Emergency Contact:   Extended Emergency Contact Information  Primary Emergency Contact: desiree blankenship  Home Phone: [phone]  Relation: Child  Secondary Emergency Contact: jose juan milian  Mobile Phone: [phone]  Relation: Child    Past Surgical History:  Past Surgical History:   Procedure Laterality Date    COLECTOMY      JOINT REPLACEMENT  2010    left knee    PATELLA FRACTURE SURGERY Left 2021    PATELLA OPEN REDUCTION INTERNAL FIXATION performed by Herbie Parisi MD at [address]  2010    right, cemented       Immunization History:   Immunization History   Administered Date(s) Administered    COVID-19, MODERNA BLUE border, Primary or Immunocompromised, (age 12y+), IM, 100 mcg/0.5mL 2021, 2021, 2021, 2021, 10/07/2021, 10/07/2021, 2022    Influenza Virus Vaccine 2017, 10/01/2018, 2019, 2020    Influenza Whole 2009    Influenza, AFLURIA (age 1 yrs+), FLUZONE, (age 10 mo+), MDV, 0.5mL 2019    Influenza, FLUCELVAX, (age 10 mo+), MDCK, MDV, 0.5mL 2017, 10/01/2018, 2020, 2021, 2022    Pneumococcal Conjugate 7-valent (Larinda Pedro) 2008, 2008    Pneumococcal Polysaccharide (Aixnvwadv60) 2017, 09/15/2022    Zoster Live (Zostavax) 2016       Active Problems:  Patient Active Problem List   Diagnosis Code    Osteoarthritis of left knee M17.12    Controlled type 2 diabetes mellitus without complication, without long-term current use of insulin ([facility] [address] E11.9    Hypertension I10    ASCVD (arteriosclerotic cardiovascular disease) I25.10    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 2XXA    Right rotator cuff tear arthropathy M75.101, M12.811    Fall from slip, trip, or stumble, initial encounter W01. 0XXA    Irritable bowel syndrome with diarrhea K58.0    Nocturnal cough with wheeze R05.8, R06.2    Exocrine pancreatic insufficiency K86.81    Closed fracture of multiple sites of phalanges of hand S62.609A    Closed multiple fractures of right hand bones with routine healing S62. 91XD    Reactive airway disease without complication B87.836    Unable to care for self Z78.9    Pure hypercholesterolemia E78.00    Frequent falls R29.6    General weakness R53.1       Isolation/Infection:   Isolation            No Isolation          Patient Infection Status       Infection Onset Added Last Indicated Last Indicated By Review Planned Expiration Resolved Resolved By    None active    Resolved    COVID-19 (Rule Out) 05/01/21 05/01/21 05/01/21 COVID-19, Rapid (Ordered)   05/01/21 Rule-Out Test Resulted            Nurse Assessment:  Last Vital Signs: BP (!) 147/86   Pulse 67   Temp 97.1 °F (36.2 °C) (Oral)   Resp 16   Ht 4' 10\" (1.473 m)   Wt 112 lb (50.8 kg)   SpO2 91%   BMI 23.41 kg/m²     Last documented pain score (0-10 scale): Pain Level: 2  Last Weight:   Wt Readings from Last 1 Encounters:   03/07/23 112 lb (50.8 kg)     Mental Status:  {IP PT MENTAL STATUS:20030}    IV Access:  {[facility] IV ACCESS:040240280}    Nursing Mobility/ADLs:  Walking   {[facility] DME CFQJ:469705505}  Transfer  {[facility] DME QCDV:095620115}  Bathing  {[facility] DME PHPR:949125805}  Dressing  {[facility] DME JGEY:224471685}  Toileting  {[facility] DME EPHC:807343524}  Feeding  {[facility] DME SYSV:317748987}  Med Admin  {[facility] DME FTEL:744754814}  Med Delivery   { JENNIE MED Delivery:514132072}    Wound Care Documentation and Therapy:  Incision 21 Knee Left (Active)   Number of days: 674        Elimination:  Continence: Bowel: {YES / FT:00309}  Bladder: {YES / WE:33132}  Urinary Catheter: {Urinary Catheter:628080622}   Colostomy/Ileostomy/Ileal Conduit: {YES / GO:78337}       Date of Last BM: ***  No intake or output data in the 24 hours ending 23 0904  No intake/output data recorded.     Safety Concerns:     [facility] [employer] Safety Concerns:024267108}    Impairments/Disabilities:      [facility] Impairments/Disabilities:911293931}    Nutrition Therapy:  Current Nutrition Therapy:   [facility] Diet List:790241684}    Routes of Feeding: {[facility] DME Other Feedings:106550611}  Liquids: {Slp liquid thickness:59712}  Daily Fluid Restriction: {[facility] DME Yes amt example:407298475}  Last Modified Barium Swallow with Video (Video Swallowing Test): {Done Not Done CGKQ:313254460}    Treatments at the Time of Hospital Discharge:   Respiratory Treatments: ***  Oxygen Therapy:  {Therapy; copd oxygen:14410}  Ventilator:    { CC Vent NDTD:629311874}    Rehab Therapies: {THERAPEUTIC INTERVENTION:0733294553}  Weight Bearing Status/Restrictions: [facility] [employer]  Weight Bearin}  Other Medical Equipment (for information only, NOT a DME order):  {EQUIPMENT:298196517}  Other Treatments: ***    Patient's personal belongings (please select all that are sent with patient):  {[facility] DME Belongings:808657065}    RN SIGNATURE:  {Esignature:123133885}    CASE MANAGEMENT/SOCIAL WORK SECTION    Inpatient Status Date: 2023    Readmission Risk Assessment Score:  Readmission Risk              Risk of Unplanned Readmission:  0           Discharging to Facility/ Agency   [address] [facility], [facility]  Report: [phone]  Fax: [phone]        / signature: Electronically signed by Grace Nassar Suzan Graham on 3/12/23 at 10:33 AM EDT    PHYSICIAN SECTION    Prognosis: Fair    Condition at Discharge: Stable    Rehab Potential (if transferring to Rehab): Good    Recommended Labs or Other Treatments After Discharge: PT OT outpatient follow up with Dr Virgie Cushing surgery as scheduled     Physician Certification: I certify the above information and transfer of David Aguero  is necessary for the continuing treatment of the diagnosis listed and that she requires [place] for less 30 days.      Update Admission H&P: No change in H&P    PHYSICIAN SIGNATURE:  Electronically signed by Levi Taylor MD on 3/8/23 at 9:05 AM EST

## 2023-03-08 NOTE — PROGRESS NOTES
Patient must have PT OT eval today    She is dismissal appropriate   Case management  to work on SNF Placement    She still has pending surgery  next Tuesday with Dr Rufino Bal  Percutaneous pinning and closed reduction of fractures of 4 fingers , must continue aggressive PT OT  pre and Post op period , ARU placement worth looking in to

## 2023-03-08 NOTE — H&P
upt\[facility]\"" 124                     [address] [facility], [address]                              HISTORY AND PHYSICAL    PATIENT NAME: Mehnaz William                  :        1935  MED REC NO:   [id number]                          ROOM:       9990  ACCOUNT NO:   [de-identified]                           ADMIT DATE: 2023  PROVIDER:     Cesar Esat MD    HISTORY OF PRESENT ILLNESS:  The patient is an 70-year-old  [place] [facility]  woman who was directly admitted to [facility] at my  intervention, has recently had multiple fractures in the right dominant  hand. She lives by herself and she is getting around with the help of a  walker. She was supposed to have percutaneous pinning and closed  reduction of these fractures under anesthesia because of the local skin  condition that looked very soggy because of recent exposure to moisture. Dr. Dori Goldmann has postponed the surgery by one week. In the  meantime, the patient is unable to provide self-care and lives by  herself, so we decided to admit her for observation and do the PT and OT  evaluation and eventually skilled nursing facility. The patient does  have right-sided hand pain and swelling with severe stiffness. Denies she is unable to ambulate or provide self care   any chest pain. No shortness of breath. Does have persistent nocturnal  cough. PAST MEDICAL HISTORY:  Pertinent for hypertension, TIA, type 2 diabetes  mellitus, hyperlipidemia, history of recent fracture _____. PAST SURGICAL HISTORY:  Pertinent for bilateral total knee replacement. Family history   both parents  and were in good health  a Brother had Hepatoma , a sister had brain bleed     SOCIAL HISTORY:  She is a . She has four grown children. She was  never a smoker, never a drinker. She lives by her independently. Does  all her activities of daily living and gets around using a walker.   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 No history of smoking, drinking or drug  abuse. MEDICATIONS:  The patient is on valsartan _____, metoprolol, doxepin,  Flonase, Humalog, Dulera, tiotropium. ALLERGIES:  The patient is allergic to PERCOCET. REVIEW OF SYSTEMS:  Negative for loss of consciousness. No visual  disturbance. No blurring. No dysphagia. Overall body mass index is  23.41. No angina pectoris. Does have resting and exertional shortness  of breath. Mild orthopnea. No paroxysmal nocturnal dyspnea. No  abdominal pain. No hematemesis. No melena. No genitourinary  complaint. Physical    VITALS:  /68 (Site: Left Upper Arm, Position: Sitting)   Pulse 74   Resp 12   Wt 114 lb (51.7 kg)   BMI 23.03 kg/m²      CONSTITUTIONAL:  awake, alert, cooperative, no apparent distress, and appears stated age  EYES:  Unremarkable   ENT:  normocepalic, without obvious abnormality, multiple bruises on face  due to recent fall and trauma   NECK:  supple, symmetrical, trachea midline, skin normal and no stridor  BACK:  symmetric and no curvature  LUNGS:  No increased work of breathing, good air exchange, clear to auscultation bilaterally, no crackles or wheezing  CARDIOVASCULAR:  Normal apical impulse, regular rate and rhythm, normal S1 and S2, no S3 or S4, and no murmur noted  ABDOMEN:  Soft BS + non tender   GENITAL/URINARY:  NEX   MUSCULOSKELETAL:  No edema   there is no redness, warmth, or swelling of the joints in all extremities except right hand   Distal pulsation in lower extremity is weaker , some callus  Formation around lateral margins of feet sammie   full range of motion noted  Right hand  swollen with extensive bruising edema tender with restricted ROM all MCP joints right Hand 2nd through 5th  fingers   NEUROLOGIC:  Intact   LABORATORY EVALUATION:  Sodium 131, potassium 3.8, chloride 92, CO2 32,  BUN 13, creatinine less than 0.5. Blood glucose 116. Serum calcium is  9.6.   Liver panel within normal limit.  White blood cell count, pending. Lab Results   Component Value Date    WBC 8.9 03/08/2023    HGB 12.6 03/08/2023    HCT 38.1 03/08/2023    MCV 89.1 03/08/2023     03/08/2023     Lab Results   Component Value Date     (L) 03/07/2023    K 3.8 03/07/2023    CL 92 (L) 03/07/2023    CO2 32 03/07/2023    BUN 13 03/07/2023    CREATININE <0.5 (L) 03/07/2023    GLUCOSE 116 (H) 03/07/2023    CALCIUM 9.6 03/07/2023    PROT 6.8 03/07/2023    LABALBU 4.1 03/07/2023    BILITOT 0.3 03/07/2023    ALKPHOS 69 03/07/2023    AST 19 03/07/2023    ALT 20 03/07/2023    LABGLOM >60 03/07/2023    GFRAA >60 09/13/2022    AGRATIO 1.5 03/07/2023    GLOB 2.9 08/10/2021           IMAGING STUDIES:  Chest x-ray, no acute infiltrate. CT right Hand   . Comminuted angulated fractures of the 2nd through 5th proximal phalanges. 2. Fracture of the 3rd proximal metacarpal.   3. Fractures at the distal dorsal aspect of the capitate and the hamate. Hospital Problems             Last Modified POA    * (Principal) Closed multiple fractures of right hand bones with routine healing 3/7/2023 Yes    Reactive airway disease without complication 8/2/7292 Yes    Unable to care for self 3/7/2023 Yes    Pure hypercholesterolemia 3/7/2023 Yes    Frequent falls 3/7/2023 Yes    General weakness 3/7/2023 Yes    Hypertension 3/7/2023 Yes       We will provide current skin care as per the guidelines of Dr. Antolin Henry. We will get Orthopedic Surgery consult. Admit the patient for  a day, parenteral analgesics and anti-inflammatory. The patient is a  full code. The patient will need social service, case management  involvement and nursing home placement temporarily.  PT OT  eval and treat         Alonzo Clifton MD    D: 03/07/2023 22:24:02       T: 03/07/2023 22:27:43     [email or web address]  Job#: [id number]     Doc#: [id number]    CC:

## 2023-03-08 NOTE — CARE COORDINATION
PM&R referral received. Chart reviewed and evaluating. However, due to lack of clinical complexity and patients payor source requiring a pre certification, which historically has been denied. Primary provider and CM/SW updated. Updated Dr. Tl Sol. ARU to sign off.     Mago Turner BSN, .[phone]

## 2023-03-09 PROCEDURE — G0378 HOSPITAL OBSERVATION PER HR: HCPCS

## 2023-03-09 PROCEDURE — 6370000000 HC RX 637 (ALT 250 FOR IP): Performed by: INTERNAL MEDICINE

## 2023-03-09 PROCEDURE — 96372 THER/PROPH/DIAG INJ SC/IM: CPT

## 2023-03-09 PROCEDURE — 94761 N-INVAS EAR/PLS OXIMETRY MLT: CPT

## 2023-03-09 PROCEDURE — 6360000002 HC RX W HCPCS: Performed by: INTERNAL MEDICINE

## 2023-03-09 PROCEDURE — 94640 AIRWAY INHALATION TREATMENT: CPT

## 2023-03-09 RX ORDER — HYDRALAZINE HYDROCHLORIDE 20 MG/ML
10 INJECTION INTRAMUSCULAR; INTRAVENOUS EVERY 6 HOURS PRN
Status: DISCONTINUED | OUTPATIENT
Start: 2023-03-09 | End: 2023-03-12 | Stop reason: HOSPADM

## 2023-03-09 RX ORDER — POTASSIUM CHLORIDE 20 MEQ/1
40 TABLET, EXTENDED RELEASE ORAL PRN
Status: DISCONTINUED | OUTPATIENT
Start: 2023-03-09 | End: 2023-03-12 | Stop reason: HOSPADM

## 2023-03-09 RX ORDER — 0.9 % SODIUM CHLORIDE 0.9 %
500 INTRAVENOUS SOLUTION INTRAVENOUS PRN
Status: DISCONTINUED | OUTPATIENT
Start: 2023-03-09 | End: 2023-03-12 | Stop reason: HOSPADM

## 2023-03-09 RX ORDER — POTASSIUM CHLORIDE 7.45 MG/ML
10 INJECTION INTRAVENOUS PRN
Status: DISCONTINUED | OUTPATIENT
Start: 2023-03-09 | End: 2023-03-12 | Stop reason: HOSPADM

## 2023-03-09 RX ADMIN — DOXEPIN HYDROCHLORIDE 50 MG: 25 CAPSULE ORAL at 20:28

## 2023-03-09 RX ADMIN — Medication 2 PUFF: at 08:25

## 2023-03-09 RX ADMIN — CARBOXYMETHYLCELLULOSE SODIUM 1 DROP: 10 GEL OPHTHALMIC at 20:28

## 2023-03-09 RX ADMIN — Medication 2 PUFF: at 22:09

## 2023-03-09 RX ADMIN — ASPIRIN 81 MG: 81 TABLET, COATED ORAL at 09:40

## 2023-03-09 RX ADMIN — ENOXAPARIN SODIUM 30 MG: 100 INJECTION SUBCUTANEOUS at 09:39

## 2023-03-09 RX ADMIN — ATORVASTATIN CALCIUM 10 MG: 10 TABLET, FILM COATED ORAL at 20:28

## 2023-03-09 RX ADMIN — THERA TABS 1 TABLET: TAB at 09:42

## 2023-03-09 RX ADMIN — Medication 2000 UNITS: at 09:40

## 2023-03-09 RX ADMIN — VALSARTAN 320 MG: 160 TABLET, FILM COATED ORAL at 09:41

## 2023-03-09 RX ADMIN — AMLODIPINE BESYLATE 10 MG: 5 TABLET ORAL at 09:39

## 2023-03-09 RX ADMIN — FLUTICASONE PROPIONATE 1 SPRAY: 50 SPRAY, METERED NASAL at 09:39

## 2023-03-09 RX ADMIN — ALOGLIPTIN 12.5 MG: 12.5 TABLET, FILM COATED ORAL at 09:39

## 2023-03-09 RX ADMIN — MONTELUKAST SODIUM 10 MG: 10 TABLET, FILM COATED ORAL at 09:40

## 2023-03-09 RX ADMIN — HYDROCODONE BITARTRATE AND ACETAMINOPHEN 1 TABLET: 5; 325 TABLET ORAL at 19:01

## 2023-03-09 RX ADMIN — METOPROLOL TARTRATE 100 MG: 50 TABLET ORAL at 09:40

## 2023-03-09 RX ADMIN — METOPROLOL TARTRATE 100 MG: 50 TABLET ORAL at 20:28

## 2023-03-09 RX ADMIN — TIOTROPIUM BROMIDE INHALATION SPRAY 2 PUFF: 3.12 SPRAY, METERED RESPIRATORY (INHALATION) at 22:07

## 2023-03-09 RX ADMIN — CYANOCOBALAMIN TAB 1000 MCG 1000 MCG: 1000 TAB at 09:43

## 2023-03-09 RX ADMIN — HYDROCHLOROTHIAZIDE 12.5 MG: 25 TABLET ORAL at 09:40

## 2023-03-09 RX ADMIN — HYDROCODONE BITARTRATE AND ACETAMINOPHEN 1 TABLET: 5; 325 TABLET ORAL at 09:41

## 2023-03-09 RX ADMIN — Medication 2 TABLET: at 09:41

## 2023-03-09 ASSESSMENT — PAIN DESCRIPTION - DESCRIPTORS
DESCRIPTORS: ACHING;DISCOMFORT
DESCRIPTORS: ACHING

## 2023-03-09 ASSESSMENT — PAIN DESCRIPTION - LOCATION
LOCATION: HAND

## 2023-03-09 ASSESSMENT — PAIN SCALES - GENERAL
PAINLEVEL_OUTOF10: 2
PAINLEVEL_OUTOF10: 8
PAINLEVEL_OUTOF10: 2
PAINLEVEL_OUTOF10: 5
PAINLEVEL_OUTOF10: 6

## 2023-03-09 ASSESSMENT — PAIN - FUNCTIONAL ASSESSMENT
PAIN_FUNCTIONAL_ASSESSMENT: ACTIVITIES ARE NOT PREVENTED
PAIN_FUNCTIONAL_ASSESSMENT: ACTIVITIES ARE NOT PREVENTED

## 2023-03-09 ASSESSMENT — PAIN DESCRIPTION - ORIENTATION
ORIENTATION: RIGHT

## 2023-03-09 NOTE — PROGRESS NOTES
Taken splint out of rt hand. Pt resting quietly in bed. Will continue to monitor. 1900 - applied splint back to the hand for overnight as per order.

## 2023-03-09 NOTE — PROGRESS NOTES
Department of Internal Medicine  General Internal Medicine   Progress Note      SUBJECTIVE: pain over all under control no cough or wheezing     History obtained from chart review and the patient  General ROS: positive for  - fatigue, malaise, and weight loss  negative for - chills, fever, or night sweats  Psychological ROS: negative  Ophthalmic ROS: negative  Respiratory ROS: positive for - cough, shortness of breath, and wheezing  negative for - hemoptysis, stridor, or tachypnea  Cardiovascular ROS: positive for - dyspnea on exertion  negative for - chest pain, irregular heartbeat, loss of consciousness, orthopnea, palpitations, or paroxysmal nocturnal dyspnea  Gastrointestinal ROS: no abdominal pain, change in bowel habits, or black or bloody stools  Genito-Urinary ROS: no dysuria, trouble voiding, or hematuria  Musculoskeletal ROS: right hand pain unable to perform ADL independently   Neurological ROS: imbalance , unsteady , multiple fall risk   Dermatological ROS: negative    OBJECTIVE      Medications      Current Facility-Administered Medications: mometasone-formoterol (DULERA) 200-5 MCG/ACT inhaler 2 puff, 2 puff, Inhalation, BID **AND** tiotropium (SPIRIVA RESPIMAT) 2.5 MCG/ACT inhaler 2 puff, 2 puff, Inhalation, QPM  enoxaparin Sodium (LOVENOX) injection 30 mg, 30 mg, SubCUTAneous, Daily  HYDROcodone-acetaminophen (NORCO) 5-325 MG per tablet 1 tablet, 1 tablet, Oral, Q6H PRN  acetaminophen (TYLENOL) tablet 650 mg, 650 mg, Oral, Q4H PRN  acetaminophen (TYLENOL) tablet 650 mg, 650 mg, Oral, Q4H PRN  albuterol sulfate HFA (PROVENTIL;VENTOLIN;PROAIR) 108 (90 Base) MCG/ACT inhaler 2 puff, 2 puff, Inhalation, Q4H PRN  amLODIPine (NORVASC) tablet 10 mg, 10 mg, Oral, Daily  aspirin EC tablet 81 mg, 81 mg, Oral, Daily  atorvastatin (LIPITOR) tablet 10 mg, 10 mg, Oral, Nightly  benzonatate (TESSALON) capsule 100 mg, 100 mg, Oral, BID PRN  calcium carb-cholecalciferol 250-3. 125 MG-MCG per tab 2 tablet, 2 tablet, Oral, Daily  carboxymethylcellulose PF (THERATEARS) 1 % ophthalmic gel 1 drop, 1 drop, Ophthalmic, Nightly  Vitamin D (CHOLECALCIFEROL) tablet 2,000 Units, 2,000 Units, Oral, Daily  doxepin (SINEQUAN) capsule 50 mg, 50 mg, Oral, Nightly  fluticasone (FLONASE) 50 MCG/ACT nasal spray 1 spray, 1 spray, Each Nostril, Daily  metoprolol tartrate (LOPRESSOR) tablet 100 mg, 100 mg, Oral, BID  montelukast (SINGULAIR) tablet 10 mg, 10 mg, Oral, Daily  multivitamin 1 tablet, 1 tablet, Oral, Daily  alogliptin (NESINA) tablet 12.5 mg, 12.5 mg, Oral, Daily  sodium chloride (OCEAN, BABY AYR) 0.65 % nasal spray 1 spray, 1 spray, Nasal, Q4H PRN  vitamin B-12 (CYANOCOBALAMIN) tablet 1,000 mcg, 1,000 mcg, Oral, Daily  dextrose bolus 10% 125 mL, 125 mL, IntraVENous, PRN **OR** dextrose bolus 10% 250 mL, 250 mL, IntraVENous, PRN  glucagon (rDNA) injection 1 mg, 1 mg, SubCUTAneous, PRN  dextrose 10 % infusion, , IntraVENous, Continuous PRN  valsartan (DIOVAN) tablet 320 mg, 320 mg, Oral, Daily **AND** hydroCHLOROthiazide (HYDRODIURIL) tablet 12.5 mg, 12.5 mg, Oral, Daily    Physical      Vitals: /64   Pulse 69   Temp 98 °F (36.7 °C) (Oral)   Resp 16   Ht 4' 10\" (1.473 m)   Wt 112 lb (50.8 kg)   SpO2 93%   BMI 23.41 kg/m²   Temp: Temp: 98 °F (36.7 °C)  Max: Temp  Av.8 °F (36.6 °C)  Min: 97.5 °F (36.4 °C)  Max: 98 °F (36.7 °C)  Respiration range:  Resp  Av.2  Min: 14  Max: 18  Pulse Range:  Pulse  Av.6  Min: 66  Max: 77  Blood pressure range:  Systolic (04DLD), JNW:789 , Min:116 , QDV:826   , Diastolic (57PKH), NBY:25, Min:64, Max:85    SpO2  Av.1 %  Min: 90 %  Max: 96 %  No intake or output data in the 24 hours ending 23 1212    Vent settings:  Pulse  Av.9  Min: 63  Max: 80  Resp  Av.2  Min: 12  Max: 20  SpO2  Av.7 %  Min: 90 %  Max: 96 %    CONSTITUTIONAL:  fatigued, alert, cooperative, mild distress, appears stated age, and thin  EYES:  Lids and lashes normal, pupils equal, round and reactive to light, extra ocular muscles intact, sclera clear, conjunctiva normal  NECK:  Supple, symmetrical, trachea midline, no adenopathy, thyroid symmetric, not enlarged and no tenderness, skin normal  BACK:  Symmetric, no curvature, spinous processes are non-tender on palpation, paraspinous muscles are non-tender on palpation, no costal vertebral tenderness  LUNGS:  No increased work of breathing, good air exchange, clear to auscultation bilaterally, no crackles or wheezing  CARDIOVASCULAR:  Normal apical impulse, regular rate and rhythm, normal S1 and S2, no S3 or S4, and no murmur noted  ABDOMEN:  soft  BS +  non tender   MUSCULOSKELETAL:  right hand swollen echymotic and multiple MCP joints swelling deformity unable to move   NEUROLOGIC:  essentially intact   SKIN:  warm and dry  and no bruising or bleeding    Data      Recent Results (from the past 96 hour(s))   Hemoglobin A1c    Collection Time: 03/07/23  3:16 PM   Result Value Ref Range    Hemoglobin A1C 6.5 See comment %    eAG 139.9 mg/dL   Comprehensive Metabolic Panel w/ Reflex to MG    Collection Time: 03/07/23  3:16 PM   Result Value Ref Range    Sodium 131 (L) 136 - 145 mmol/L    Potassium reflex Magnesium 3.8 3.5 - 5.1 mmol/L    Chloride 92 (L) 99 - 110 mmol/L    CO2 32 21 - 32 mmol/L    Anion Gap 7 3 - 16    Glucose 116 (H) 70 - 99 mg/dL    BUN 13 7 - 20 mg/dL    Creatinine <0.5 (L) 0.6 - 1.2 mg/dL    Est, Glom Filt Rate >60 >60    Calcium 9.6 8.3 - 10.6 mg/dL    Total Protein 6.8 6.4 - 8.2 g/dL    Albumin 4.1 3.4 - 5.0 g/dL    Albumin/Globulin Ratio 1.5 1.1 - 2.2    Total Bilirubin 0.3 0.0 - 1.0 mg/dL    Alkaline Phosphatase 69 40 - 129 U/L    ALT 20 10 - 40 U/L    AST 19 15 - 37 U/L   POCT Glucose    Collection Time: 03/07/23  4:46 PM   Result Value Ref Range    POC Glucose 77 70 - 99 mg/dl    Performed on ACCU-CHEK    POCT Glucose    Collection Time: 03/07/23  8:16 PM   Result Value Ref Range    POC Glucose 87 70 - 99 mg/dl    Performed on ACCU-CHEK    CBC    Collection Time: 03/08/23  5:10 AM   Result Value Ref Range    WBC 8.9 4.0 - 11.0 K/uL    RBC 4.27 4.00 - 5.20 M/uL    Hemoglobin 12.6 12.0 - 16.0 g/dL    Hematocrit 38.1 36.0 - 48.0 %    MCV 89.1 80.0 - 100.0 fL    MCH 29.4 26.0 - 34.0 pg    MCHC 33.0 31.0 - 36.0 g/dL    RDW 14.1 12.4 - 15.4 %    Platelets 921 195 - 756 K/uL    MPV 7.3 5.0 - 10.5 fL   POCT Glucose    Collection Time: 03/08/23  7:23 AM   Result Value Ref Range    POC Glucose 146 (H) 70 - 99 mg/dl    Performed on ACCU-CHEK    POCT Glucose    Collection Time: 03/08/23 11:16 AM   Result Value Ref Range    POC Glucose 132 (H) 70 - 99 mg/dl    Performed on [address] [place] [address] Problems             Last Modified POA    * (Principal) Closed multiple fractures of right hand bones with routine healing 3/7/2023 Yes    Reactive airway disease without complication 0/5/1344 Yes    Unable to care for self 3/7/2023 Yes    Pure hypercholesterolemia 3/7/2023 Yes    Frequent falls 3/7/2023 Yes    General weakness 3/7/2023 Yes    Hypertension 3/7/2023 Yes     PT YULIA    [facility] has accepted her    [facility]  pre-cert awaited    Diabetes under control will stop accu checks    Ct Januvia   Ct Norco prn especially at HS   HTN is well controlled at this time on 3 anti HTN

## 2023-03-09 NOTE — PROGRESS NOTES
2134: Assessment complete, VSS, pt able to wiggle R fingers, warm to touch, pt has mild pain, states Norco has helped with the pain, assisted to the BR and abck to bed, tolerated well, elevated RUE on pillow, discussed care plan, pt mutually agrees, call light and belongings in reach, will continue monitoring.   Bailey Greenberg RN

## 2023-03-09 NOTE — PROGRESS NOTES
Department of Internal Medicine  General Internal Medicine   Progress Note      SUBJECTIVE: intermittent throbbin right hand Pain no fever or chills , cough reduced but persists no wheezing     History obtained from chart review and the patient  General ROS: positive for  - fatigue, malaise, and weight loss  negative for - chills, fever, or night sweats  Psychological ROS: negative  Ophthalmic ROS: negative  Respiratory ROS: positive for - cough, shortness of breath, and wheezing  negative for - hemoptysis, stridor, or tachypnea  Cardiovascular ROS: positive for - dyspnea on exertion  negative for - chest pain, irregular heartbeat, loss of consciousness, orthopnea, palpitations, or paroxysmal nocturnal dyspnea  Gastrointestinal ROS: no abdominal pain, change in bowel habits, or black or bloody stools  Genito-Urinary ROS: no dysuria, trouble voiding, or hematuria  Musculoskeletal ROS: right hand pain unable to perform ADL independently   Neurological ROS: imbalance , unsteady , multiple fall risk   Dermatological ROS: negative    OBJECTIVE      Medications      Current Facility-Administered Medications: enoxaparin Sodium (LOVENOX) injection 30 mg, 30 mg, SubCUTAneous, Daily  HYDROcodone-acetaminophen (NORCO) 5-325 MG per tablet 1 tablet, 1 tablet, Oral, Q6H PRN  acetaminophen (TYLENOL) tablet 650 mg, 650 mg, Oral, Q4H PRN  acetaminophen (TYLENOL) tablet 650 mg, 650 mg, Oral, Q4H PRN  albuterol sulfate HFA (PROVENTIL;VENTOLIN;PROAIR) 108 (90 Base) MCG/ACT inhaler 2 puff, 2 puff, Inhalation, Q4H PRN  amLODIPine (NORVASC) tablet 10 mg, 10 mg, Oral, Daily  aspirin EC tablet 81 mg, 81 mg, Oral, Daily  atorvastatin (LIPITOR) tablet 10 mg, 10 mg, Oral, Nightly  benzonatate (TESSALON) capsule 100 mg, 100 mg, Oral, BID PRN  calcium carb-cholecalciferol 250-3. 125 MG-MCG per tab 2 tablet, 2 tablet, Oral, Daily  carboxymethylcellulose PF (THERATEARS) 1 % ophthalmic gel 1 drop, 1 drop, Ophthalmic, Nightly  Vitamin D (CHOLECALCIFEROL) tablet 2,000 Units, 2,000 Units, Oral, Daily  doxepin (SINEQUAN) capsule 50 mg, 50 mg, Oral, Nightly  fluticasone (FLONASE) 50 MCG/ACT nasal spray 1 spray, 1 spray, Each Nostril, Daily  metoprolol tartrate (LOPRESSOR) tablet 100 mg, 100 mg, Oral, BID  montelukast (SINGULAIR) tablet 10 mg, 10 mg, Oral, Daily  multivitamin 1 tablet, 1 tablet, Oral, Daily  alogliptin (NESINA) tablet 12.5 mg, 12.5 mg, Oral, Daily  sodium chloride (OCEAN, BABY AYR) 0.65 % nasal spray 1 spray, 1 spray, Nasal, Q4H PRN  vitamin B-12 (CYANOCOBALAMIN) tablet 1,000 mcg, 1,000 mcg, Oral, Daily  dextrose bolus 10% 125 mL, 125 mL, IntraVENous, PRN **OR** dextrose bolus 10% 250 mL, 250 mL, IntraVENous, PRN  glucagon (rDNA) injection 1 mg, 1 mg, SubCUTAneous, PRN  dextrose 10 % infusion, , IntraVENous, Continuous PRN  valsartan (DIOVAN) tablet 320 mg, 320 mg, Oral, Daily **AND** hydroCHLOROthiazide (HYDRODIURIL) tablet 12.5 mg, 12.5 mg, Oral, Daily  mometasone-formoterol (DULERA) 200-5 MCG/ACT inhaler 2 puff, 2 puff, Inhalation, BID **AND** tiotropium (SPIRIVA RESPIMAT) 2.5 MCG/ACT inhaler 2 puff, 2 puff, Inhalation, Daily    Physical      Vitals: /85   Pulse 66   Temp 97.5 °F (36.4 °C) (Oral)   Resp 18   Ht 4' 10\" (1.473 m)   Wt 112 lb (50.8 kg)   SpO2 91%   BMI 23.41 kg/m²   Temp: Temp: 97.5 °F (36.4 °C)  Max: Temp  Av.8 °F (36.6 °C)  Min: 97.1 °F (36.2 °C)  Max: 98.4 °F (36.9 °C)  Respiration range:  Resp  Av.2  Min: 16  Max: 18  Pulse Range:  Pulse  Av.7  Min: 66  Max: 77  Blood pressure range:  Systolic (63VPW), IZM:006 , Min:115 , QQQ:079   , Diastolic (45PIU), AIR:72, Min:72, Max:86    SpO2  Av.5 %  Min: 90 %  Max: 94 %  No intake or output data in the 24 hours ending 23 2329    Vent settings:  Pulse  Av.2  Min: 63  Max: 80  Resp  Av.2  Min: 12  Max: 20  SpO2  Av.5 %  Min: 90 %  Max: 95 %    CONSTITUTIONAL:  fatigued, alert, cooperative, mild distress, appears stated age, and thin  EYES:  Lids and lashes normal, pupils equal, round and reactive to light, extra ocular muscles intact, sclera clear, conjunctiva normal  NECK:  Supple, symmetrical, trachea midline, no adenopathy, thyroid symmetric, not enlarged and no tenderness, skin normal  BACK:  Symmetric, no curvature, spinous processes are non-tender on palpation, paraspinous muscles are non-tender on palpation, no costal vertebral tenderness  LUNGS:  No increased work of breathing, good air exchange, clear to auscultation bilaterally, no crackles or wheezing  CARDIOVASCULAR:  Normal apical impulse, regular rate and rhythm, normal S1 and S2, no S3 or S4, and no murmur noted  ABDOMEN:  soft  BS +  non tender   MUSCULOSKELETAL:  right hand swollen echymotic and multiple MCP joints swelling deformity unable to move   NEUROLOGIC:  essentially intact   SKIN:  warm and dry  and no bruising or bleeding    Data      Recent Results (from the past 96 hour(s))   Hemoglobin A1c    Collection Time: 03/07/23  3:16 PM   Result Value Ref Range    Hemoglobin A1C 6.5 See comment %    eAG 139.9 mg/dL   Comprehensive Metabolic Panel w/ Reflex to MG    Collection Time: 03/07/23  3:16 PM   Result Value Ref Range    Sodium 131 (L) 136 - 145 mmol/L    Potassium reflex Magnesium 3.8 3.5 - 5.1 mmol/L    Chloride 92 (L) 99 - 110 mmol/L    CO2 32 21 - 32 mmol/L    Anion Gap 7 3 - 16    Glucose 116 (H) 70 - 99 mg/dL    BUN 13 7 - 20 mg/dL    Creatinine <0.5 (L) 0.6 - 1.2 mg/dL    Est, Glom Filt Rate >60 >60    Calcium 9.6 8.3 - 10.6 mg/dL    Total Protein 6.8 6.4 - 8.2 g/dL    Albumin 4.1 3.4 - 5.0 g/dL    Albumin/Globulin Ratio 1.5 1.1 - 2.2    Total Bilirubin 0.3 0.0 - 1.0 mg/dL    Alkaline Phosphatase 69 40 - 129 U/L    ALT 20 10 - 40 U/L    AST 19 15 - 37 U/L   POCT Glucose    Collection Time: 03/07/23  4:46 PM   Result Value Ref Range    POC Glucose 77 70 - 99 mg/dl    Performed on ACCU-CHEK    POCT Glucose    Collection Time: 03/07/23  8:16 PM Result Value Ref Range    POC Glucose 87 70 - 99 mg/dl    Performed on ACCU-CHEK    CBC    Collection Time: 03/08/23  5:10 AM   Result Value Ref Range    WBC 8.9 4.0 - 11.0 K/uL    RBC 4.27 4.00 - 5.20 M/uL    Hemoglobin 12.6 12.0 - 16.0 g/dL    Hematocrit 38.1 36.0 - 48.0 %    MCV 89.1 80.0 - 100.0 fL    MCH 29.4 26.0 - 34.0 pg    MCHC 33.0 31.0 - 36.0 g/dL    RDW 14.1 12.4 - 15.4 %    Platelets 931 649 - 932 K/uL    MPV 7.3 5.0 - 10.5 fL   POCT Glucose    Collection Time: 03/08/23  7:23 AM   Result Value Ref Range    POC Glucose 146 (H) 70 - 99 mg/dl    Performed on ACCU-CHEK    POCT Glucose    Collection Time: 03/08/23 11:16 AM   Result Value Ref Range    POC Glucose 132 (H) 70 - 99 mg/dl    Performed on [address] [place] [address] Problems             Last Modified POA    * (Principal) Closed multiple fractures of right hand bones with routine healing 3/7/2023 Yes    Reactive airway disease without complication 1/5/5051 Yes    Unable to care for self 3/7/2023 Yes    Pure hypercholesterolemia 3/7/2023 Yes    Frequent falls 3/7/2023 Yes    General weakness 3/7/2023 Yes    Hypertension 3/7/2023 Yes     PT OT    She is discharge appropriate   Still needs Opiates  but more sparingly    PT OT AM PAC score at 14  which dictates  she goes to a SNF, surgery for pinning still pending

## 2023-03-10 LAB
ANION GAP SERPL CALCULATED.3IONS-SCNC: 10 MMOL/L (ref 3–16)
BASOPHILS ABSOLUTE: 0 K/UL (ref 0–0.2)
BASOPHILS RELATIVE PERCENT: 0.4 %
BUN BLDV-MCNC: 20 MG/DL (ref 7–20)
CALCIUM SERPL-MCNC: 8.9 MG/DL (ref 8.3–10.6)
CHLORIDE BLD-SCNC: 94 MMOL/L (ref 99–110)
CO2: 24 MMOL/L (ref 21–32)
CREAT SERPL-MCNC: <0.5 MG/DL (ref 0.6–1.2)
EOSINOPHILS ABSOLUTE: 0.1 K/UL (ref 0–0.6)
EOSINOPHILS RELATIVE PERCENT: 0.9 %
GFR SERPL CREATININE-BSD FRML MDRD: >60 ML/MIN/{1.73_M2}
GLUCOSE BLD-MCNC: 141 MG/DL (ref 70–99)
GLUCOSE BLD-MCNC: 254 MG/DL (ref 70–99)
HCT VFR BLD CALC: 38.7 % (ref 36–48)
HEMOGLOBIN: 12.8 G/DL (ref 12–16)
LYMPHOCYTES ABSOLUTE: 1.9 K/UL (ref 1–5.1)
LYMPHOCYTES RELATIVE PERCENT: 17.9 %
MCH RBC QN AUTO: 29.3 PG (ref 26–34)
MCHC RBC AUTO-ENTMCNC: 33 G/DL (ref 31–36)
MCV RBC AUTO: 88.8 FL (ref 80–100)
MONOCYTES ABSOLUTE: 1.2 K/UL (ref 0–1.3)
MONOCYTES RELATIVE PERCENT: 11.1 %
NEUTROPHILS ABSOLUTE: 7.5 K/UL (ref 1.7–7.7)
NEUTROPHILS RELATIVE PERCENT: 69.7 %
PDW BLD-RTO: 14 % (ref 12.4–15.4)
PERFORMED ON: ABNORMAL
PLATELET # BLD: 272 K/UL (ref 135–450)
PMV BLD AUTO: 6.9 FL (ref 5–10.5)
POTASSIUM SERPL-SCNC: 4 MMOL/L (ref 3.5–5.1)
RBC # BLD: 4.36 M/UL (ref 4–5.2)
SODIUM BLD-SCNC: 128 MMOL/L (ref 136–145)
WBC # BLD: 10.8 K/UL (ref 4–11)

## 2023-03-10 PROCEDURE — 6370000000 HC RX 637 (ALT 250 FOR IP): Performed by: INTERNAL MEDICINE

## 2023-03-10 PROCEDURE — 96372 THER/PROPH/DIAG INJ SC/IM: CPT

## 2023-03-10 PROCEDURE — 94761 N-INVAS EAR/PLS OXIMETRY MLT: CPT

## 2023-03-10 PROCEDURE — G0378 HOSPITAL OBSERVATION PER HR: HCPCS

## 2023-03-10 PROCEDURE — 94640 AIRWAY INHALATION TREATMENT: CPT

## 2023-03-10 PROCEDURE — 97530 THERAPEUTIC ACTIVITIES: CPT

## 2023-03-10 PROCEDURE — 6360000002 HC RX W HCPCS: Performed by: INTERNAL MEDICINE

## 2023-03-10 PROCEDURE — APPNB45 APP NON BILLABLE 31-45 MINUTES: Performed by: NURSE PRACTITIONER

## 2023-03-10 PROCEDURE — 80048 BASIC METABOLIC PNL TOTAL CA: CPT

## 2023-03-10 PROCEDURE — 85025 COMPLETE CBC W/AUTO DIFF WBC: CPT

## 2023-03-10 RX ADMIN — ATORVASTATIN CALCIUM 10 MG: 10 TABLET, FILM COATED ORAL at 21:04

## 2023-03-10 RX ADMIN — Medication 2 PUFF: at 21:41

## 2023-03-10 RX ADMIN — ALOGLIPTIN 12.5 MG: 12.5 TABLET, FILM COATED ORAL at 09:30

## 2023-03-10 RX ADMIN — Medication 2 TABLET: at 13:47

## 2023-03-10 RX ADMIN — ACETAMINOPHEN 650 MG: 325 TABLET ORAL at 21:32

## 2023-03-10 RX ADMIN — DOXEPIN HYDROCHLORIDE 50 MG: 25 CAPSULE ORAL at 21:14

## 2023-03-10 RX ADMIN — METOPROLOL TARTRATE 100 MG: 50 TABLET ORAL at 21:05

## 2023-03-10 RX ADMIN — THERA TABS 1 TABLET: TAB at 09:31

## 2023-03-10 RX ADMIN — Medication 2000 UNITS: at 09:30

## 2023-03-10 RX ADMIN — CARBOXYMETHYLCELLULOSE SODIUM 1 DROP: 10 GEL OPHTHALMIC at 21:14

## 2023-03-10 RX ADMIN — Medication 2 PUFF: at 10:34

## 2023-03-10 RX ADMIN — ACETAMINOPHEN 650 MG: 325 TABLET ORAL at 09:31

## 2023-03-10 RX ADMIN — CYANOCOBALAMIN TAB 1000 MCG 1000 MCG: 1000 TAB at 09:33

## 2023-03-10 RX ADMIN — ENOXAPARIN SODIUM 30 MG: 100 INJECTION SUBCUTANEOUS at 09:32

## 2023-03-10 RX ADMIN — ASPIRIN 81 MG: 81 TABLET, COATED ORAL at 09:30

## 2023-03-10 RX ADMIN — TIOTROPIUM BROMIDE INHALATION SPRAY 2 PUFF: 3.12 SPRAY, METERED RESPIRATORY (INHALATION) at 21:41

## 2023-03-10 RX ADMIN — MONTELUKAST SODIUM 10 MG: 10 TABLET, FILM COATED ORAL at 09:30

## 2023-03-10 RX ADMIN — FLUTICASONE PROPIONATE 1 SPRAY: 50 SPRAY, METERED NASAL at 09:32

## 2023-03-10 RX ADMIN — HYDROCODONE BITARTRATE AND ACETAMINOPHEN 1 TABLET: 5; 325 TABLET ORAL at 05:52

## 2023-03-10 ASSESSMENT — PAIN SCALES - GENERAL
PAINLEVEL_OUTOF10: 6
PAINLEVEL_OUTOF10: 7
PAINLEVEL_OUTOF10: 7

## 2023-03-10 ASSESSMENT — PAIN DESCRIPTION - ORIENTATION
ORIENTATION: RIGHT

## 2023-03-10 ASSESSMENT — PAIN DESCRIPTION - LOCATION
LOCATION: HAND
LOCATION: WRIST
LOCATION: ARM;HAND

## 2023-03-10 ASSESSMENT — PAIN DESCRIPTION - DESCRIPTORS
DESCRIPTORS: DISCOMFORT
DESCRIPTORS: THROBBING
DESCRIPTORS: THROBBING

## 2023-03-10 NOTE — PROGRESS NOTES
Shift assessment and AM vitals complete, VSS. AM meds given. Pt doing well, R arm out of splint at this time, okay per ortho. R hand swollen at this time. Pt tolerating pain, pain minimal. The care plan and education has been reviewed and mutually agreed upon with the patient. Patient remains free from falls or accidental injury. Room free from clutter. All fall precautions in place. Bed in lowest position with wheels locked and alarm on, call light and belongings within reach, and door open.

## 2023-03-10 NOTE — PROGRESS NOTES
Presently out of town Dr Bettina Pastor covering and is the new admitting  doctor but for the sake of completion of P2P appeal process , if  follow up phone call with [facility] needed , I will remain available .  Please contact me at [phone]

## 2023-03-10 NOTE — PROGRESS NOTES
Physical Therapy    [facility] [address]   Phone: [phone]    Physical Therapy    [] Initial Evaluation            [x] Daily Treatment Note         [] Discharge Summary      Patient: Butch Dietrich   : 1935   MRN: [id number]   Date of Service:  3/10/2023  Admitting Diagnosis: Closed multiple fractures of right hand bones with routine healing  Current Admission Summary: The patient is an 71-year-old  [place] [facility])  woman who was directly admitted to [facility] at my  intervention, has recently had multiple fractures in the right dominant  hand. She lives by herself and she is getting around with the help of a walker. She was supposed to have percutaneous pinning and closedreduction of these fractures under anesthesia because of the condition that looked very soggy because of recent exposure to moisture. Dr. Scott Perez has postponed the surgery by one week  Past Medical History:  has a past medical history of Arthritis, Diabetes mellitus ([address] HYPERCHOLESTERAEMIA, Hypertension, and TIA (transient ischaemic attack). Past Surgical History:  has a past surgical history that includes colectomy; joint replacement (2010); Total knee arthroplasty (2010); and Patella fracture surgery (Left, 2021). Discharge Recommendations: Butch Dietrich scored a 14/24 on the AM-PAC short mobility form. Current research shows that an AM-PAC score of 17 or less is typically not associated with a discharge to the patient's home setting. Based on the patient's AM-PAC score and their current functional mobility deficits, it is recommended that the patient have 3-5 sessions per week of Physical Therapy at d/c to increase the patient's independence. Please see assessment section for further patient specific details. If patient discharges prior to next session this note will serve as a discharge summary.   Please see below for the latest assessment towards goals. Patient unsafe to return home without supervision, recommend SNF placement to address noted impairments below  DME Required For Discharge: DME to be determined at next level of care  Precautions/Restrictions: high fall risk, weight bearing, ROM restrictions  Weight Bearing Restrictions: non weight bearing  [x] Right Upper Extremity  [] Left Upper Extremity [] Right Lower Extremity  [] Left Lower Extremity     Required Braces/Orthotics:  R hand/wrist splint   [x] Right  [] Left  Positional Restrictions:no positional restrictions    Pre-Admission Information   Lives With: alone                     Type of Home: apartment  Home Layout: one level  Home Access: elevator  Bathroom Layout: walk in shower  Bathroom Equipment: grab bars in shower, grab bars around toilet, shower chair  Toilet Height: standard height  Home Equipment: rolling walker  Transfer Assistance: modified independent with use of R walker  Ambulation Assistance:modified independent with use of R walker  ADL Assistance: independent with all ADL's  IADL Assistance: requires assistance with driving/transportation, requires assistance with shopping  Active :        [] Yes                 [x] No  Hand Dominance: [] Left                 [x] Right  Current Employment: retired. Occupation: homemaker  Hobbies: cooking  Recent Falls: one fall reported leading to this admission    Examination   Observation:    Increased edema and erythema of R hand this date      Subjective  General: Patient sitting in recliner chair upon arrival. RN present reporting patient experiencing increased dizziness and generalized weakness this date. Patient is agreeable to PT.   Pain: Patient does not rate upon questioning; R hand  Pain Interventions: repositioned        Functional Mobility  Bed Mobility  Supine to Sit: contact guard assistance  Sit to Supine: minimal assistance  Scooting: contact guard assistance  Comments: Patient completed bed mobility with bed flat to mimic home setting. Patient required increased assistance this date due to inability to utilize B UE to boost self up secondary to NWB of R hand. Patient reporting fear of falling with bed flat, required max encouragement and motivation to complete in addition to assistance for increased safety. Transfers  Sit to stand transfer: contact guard assistance  Stand to sit transfer: contact guard assistance  Comments: Patient demonstrates regression in STS this date, strength deficits of B LE noted this date requiring CGA to complete STS from recliner chair and EOB. Patient reported dizziness upon standing. Ambulation  Surface:level surface  Assistive Device: no device  Assistance: minimal assistance, moderate assistance  Distance: 2 steps  Gait Mechanics: Decreased mora, unsteady with turns, decreased step height/length  Comments: Due to patient demonstrating inability to edy platform walker independently, trial of ambulation without AD was attempted. Patient completed two steps demonstrating increased unsteadiness resulting in return to chair due to safety  Ambulation  Surface:level surface  Assistive Device: (R) Platform Walker  Assistance: contact guard assistance  Distance: 10' + 10'  Gait Mechanics: Decreased mora, unsteady with turns, decreased step height/length, NBOS, forward flexion  Comments: Donning of (R) platform walker required max A from this PT due to patient's inability to utilize non-dominant hand to place straps, patient ambulated from recliner chair to bed due to patient's dizziness this date. Stair Mobility  Stair mobility not completed on this date. Comments:  Wheelchair Mobility:  No w/c mobility completed on this date.   Comments:  Balance  Static Sitting Balance: fair (+): maintains balance at SBA/supervision without use of UE support  Dynamic Sitting Balance: fair (+): maintains balance at SBA/supervision without use of UE support  Static Standing Balance: fair (-): maintains balance at [facility] with use of UE support  Dynamic Standing Balance: poor (+): requires min (A) to maintain balance  Comments: Patient demonstrates unsteadiness in standing this date     Other Therapeutic Interventions  Repositioned in bed   Functional Outcomes  AM-PAC Inpatient Mobility Raw Score : 14              Cognition  WFL  Orientation:    alert and oriented x 4  Command Following:   [facility]    Education  Barriers To Learning: none  Patient Education: patient educated on goals, PT role and benefits, plan of care, precautions, weight-bearing education, general safety, functional mobility training, pressure relief, transfer training, discharge recommendations  Learning Assessment:  patient verbalizes and demonstrates understanding    Assessment  Activity Tolerance: Poor; limited by dizziness this date, vitals: BP in sitting 100/65, BP in standing 97/64 symptomatic   Impairments Requiring Therapeutic Intervention: decreased functional mobility, decreased ROM, decreased strength, decreased endurance, decreased balance, increased pain  Prognosis: good  Clinical Assessment: Patient demonstrates significant regression in ability to perform functional mobility this date. Patient required increased assistance to CGA-min A with bed mobility due to bed being flat to mimic home setting with noted fear of falling. In addition, patient trialed ambulation without AD due to patient requiring max A for donning of (R) platform walker without having support at home for assistance, patient will be unable to utilize (R) platform walker. Patient was able to ambulate 2 feet at min-mod A before returning to recliner chair without AD due to increased unsteadiness resulting in an unsafe situation. Patient unable to ambulate household distances this date with use of (R) platform walker due to generalized weakness of B LE.  At this time, patient is not safe to return home due to no support at home for assistance with all functional mobility and donning of platform walker resulting in patient being a high fall risk. In addition, patient demonstrating increased generalized weakness of B LE and increased dizziness with transfers this date.  Due to patient being unable to perform any functional mobility without assistance less than CGA-mod A, I recommend patient d/c to SNF to address noted impairments in order to return to PLOF with all functional mobility prior to d/c from hospital.     Safety Interventions: patient left in bed, bed alarm in place, call light within reach, patient at risk for falls, and nurse notified    Plan  Frequency: 3-5 x/per week  Current Treatment Recommendations: strengthening, ROM, balance training, functional mobility training, endurance training, patient/caregiver education, and home exercise program    Goals  Patient Goals: Return home   Short Term Goals:  Time Frame: Upon d/c  Patient will complete bed mobility at modified independent   Patient will complete transfers at [facility]   Patient will ambulate 200 ft with use of platform walker (R) at modified independent  3/10: NO GOALS MET     Therapy Session Time      Individual Group Co-treatment   Time In 0950       Time Out 1015       Minutes 25         Timed Code Treatment Minutes:  25 Minutes  Total Treatment Minutes:  25 Minutes       Electronically Signed By: Anderson Montero, PT  Anderson Montero PT, DPT, [id number]

## 2023-03-10 NOTE — PROGRESS NOTES
2026: Assessment complete, VSS, RUE splint remains in place, pt states pain getting better, assisted to the BR and ambulated in the hallway, contact guard, pt gait is unsteady, back to bed, discussed care plan, pt mutually agrees, call light and belongings in reach. 0000: pt removed splint, R fingers bruised and deformed, pt unable to straighten finger, knuckles swollen/bruised and painful to touch, replaced splint, pt tolerated fairly well, elevated RUE on pillow, no other needs at this time.   Iman Collins RN

## 2023-03-10 NOTE — PROGRESS NOTES
Progress Note - Dr. Judie Caballero - Internal Medicine  PCP: Bianca Dominguez MD [address] [facility] / Tarun Edmonson [phone]    Hospital Day: 0  Code Status: Full Code  Current Diet: ADULT DIET; Regular; 3 carb choices (45 gm/meal); Lactose-Controlled, Vegetarian (Lacto-Ovo)        CC: follow up on medical issues    Subjective: Jay Jackson is a 80 y.o. female. Pt seen and examined  Chart reviewed since last visit, labs and imaging below      Doing ok  OT score 14 PT 18        Review of Systems: (1 system for EPF, 2-9 for detailed, 10+ for comprehensive)  Constitutional: Negative for chills and fever. HENT: Negative for dental problem, nosebleeds and rhinorrhea. Eyes: Negative for photophobia and visual disturbance. Respiratory: Negative for cough, chest tightness and shortness of breath. Cardiovascular: Negative for chest pain and leg swelling. Gastrointestinal: Negative for diarrhea, nausea and vomiting. Endocrine: Negative for polydipsia and polyphagia. Genitourinary: Negative for frequency, hematuria and urgency. Musculoskeletal: Negative for back pain and myalgias. Skin: Negative for rash. Allergic/Immunologic: Negative for food allergies. Neurological: Negative for dizziness, seizures, syncope and facial asymmetry. Hematological: Negative for adenopathy. Psychiatric/Behavioral: Negative for dysphoric mood. The patient is not nervous/anxious. I have reviewed the patient's medical and social history in detail and updated the computerized patient record. To recap: She  has a past medical history of Arthritis, Diabetes mellitus ([address] HYPERCHOLESTERAEMIA, Hypertension, and TIA (transient ischaemic attack). . She  has a past surgical history that includes colectomy; joint replacement (04/22/2010); Total knee arthroplasty (8/19/2010); and Patella fracture surgery (Left, 5/2/2021). . She  reports that she has never smoked.  She has never used smokeless tobacco. She reports that she does not drink alcohol and does not use drugs. .        Active Hospital Problems    Diagnosis Date Noted    Closed multiple fractures of right hand bones with routine healing [S62.91XD] 03/07/2023     Priority: Medium    Reactive airway disease without complication [T66.277] 59/94/6982     Priority: Medium    Unable to care for self [Z78.9]      Priority: Medium    Pure hypercholesterolemia [E78.00]      Priority: Medium    Frequent falls [R29.6]      Priority: Medium    General weakness [R53.1]      Priority: Medium    Hypertension [I10] 04/22/2010       Current Facility-Administered Medications: mometasone-formoterol (DULERA) 200-5 MCG/ACT inhaler 2 puff, 2 puff, Inhalation, BID **AND** tiotropium (SPIRIVA RESPIMAT) 2.5 MCG/ACT inhaler 2 puff, 2 puff, Inhalation, QPM  hydrALAZINE (APRESOLINE) injection 10 mg, 10 mg, IntraVENous, Q6H PRN  0.9 % sodium chloride bolus, 500 mL, IntraVENous, PRN  potassium chloride (KLOR-CON M) extended release tablet 40 mEq, 40 mEq, Oral, PRN **OR** potassium bicarb-citric acid (EFFER-K) effervescent tablet 40 mEq, 40 mEq, Oral, PRN **OR** potassium chloride 10 mEq/100 mL IVPB (Peripheral Line), 10 mEq, IntraVENous, PRN  enoxaparin Sodium (LOVENOX) injection 30 mg, 30 mg, SubCUTAneous, Daily  HYDROcodone-acetaminophen (NORCO) 5-325 MG per tablet 1 tablet, 1 tablet, Oral, Q6H PRN  acetaminophen (TYLENOL) tablet 650 mg, 650 mg, Oral, Q4H PRN  acetaminophen (TYLENOL) tablet 650 mg, 650 mg, Oral, Q4H PRN  albuterol sulfate HFA (PROVENTIL;VENTOLIN;PROAIR) 108 (90 Base) MCG/ACT inhaler 2 puff, 2 puff, Inhalation, Q4H PRN  amLODIPine (NORVASC) tablet 10 mg, 10 mg, Oral, Daily  aspirin EC tablet 81 mg, 81 mg, Oral, Daily  atorvastatin (LIPITOR) tablet 10 mg, 10 mg, Oral, Nightly  benzonatate (TESSALON) capsule 100 mg, 100 mg, Oral, BID PRN  calcium carb-cholecalciferol 250-3. 125 MG-MCG per tab 2 tablet, 2 tablet, Oral, Daily  carboxymethylcellulose PF (THERATEARS) 1 % ophthalmic gel 1 drop, 1 drop, Ophthalmic, Nightly  Vitamin D (CHOLECALCIFEROL) tablet 2,000 Units, 2,000 Units, Oral, Daily  doxepin (SINEQUAN) capsule 50 mg, 50 mg, Oral, Nightly  fluticasone (FLONASE) 50 MCG/ACT nasal spray 1 spray, 1 spray, Each Nostril, Daily  metoprolol tartrate (LOPRESSOR) tablet 100 mg, 100 mg, Oral, BID  montelukast (SINGULAIR) tablet 10 mg, 10 mg, Oral, Daily  multivitamin 1 tablet, 1 tablet, Oral, Daily  alogliptin (NESINA) tablet 12.5 mg, 12.5 mg, Oral, Daily  sodium chloride (OCEAN, BABY AYR) 0.65 % nasal spray 1 spray, 1 spray, Nasal, Q4H PRN  vitamin B-12 (CYANOCOBALAMIN) tablet 1,000 mcg, 1,000 mcg, Oral, Daily  dextrose bolus 10% 125 mL, 125 mL, IntraVENous, PRN **OR** dextrose bolus 10% 250 mL, 250 mL, IntraVENous, PRN  glucagon (rDNA) injection 1 mg, 1 mg, SubCUTAneous, PRN  dextrose 10 % infusion, , IntraVENous, Continuous PRN  valsartan (DIOVAN) tablet 320 mg, 320 mg, Oral, Daily **AND** hydroCHLOROthiazide (HYDRODIURIL) tablet 12.5 mg, 12.5 mg, Oral, Daily         Objective:  /68   Pulse 62   Temp 97.2 °F (36.2 °C) (Oral)   Resp 14   Ht 4' 10\" (1.473 m)   Wt 112 lb (50.8 kg)   SpO2 92%   BMI 23.41 kg/m²      Patient Vitals for the past 24 hrs:   BP Temp Temp src Pulse Resp SpO2   03/10/23 0815 102/68 97.2 °F (36.2 °C) Oral 62 14 92 %   03/09/23 2207 -- -- -- 73 16 92 %   03/09/23 2015 135/78 98.1 °F (36.7 °C) Oral 75 16 94 %   03/09/23 1901 -- -- -- -- 18 --   03/09/23 1011 -- -- -- -- 16 --   03/09/23 0930 116/64 98 °F (36.7 °C) Oral 69 16 93 %     Patient Vitals for the past 96 hrs (Last 3 readings):   Weight   03/07/23 1910 112 lb (50.8 kg)         No intake or output data in the 24 hours ending 03/10/23 0859      Physical Exam: (2-7 system for EPF/Detailed, ?8 for Comprehensive)  /68   Pulse 62   Temp 97.2 °F (36.2 °C) (Oral)   Resp 14   Ht 4' 10\" (1.473 m)   Wt 112 lb (50.8 kg)   SpO2 92%   BMI 23.41 kg/m²   Constitutional: vitals as above: alert, appears stated age and cooperative    Psychiatric: normal insight and judgment, oriented to person, place, time, and general circumstances    Head: Normocephalic, without obvious abnormality, atraumatic    Eyes:lids and lashes normal, conjunctivae and sclerae normal and pupils equal, round, reactive to light and accomodation    EMNT: external ears normal, nares midline    Neck: no carotid bruit, supple, symmetrical, trachea midline and thyroid not enlarged, symmetric, no tenderness/mass/nodules     Respiratory: clear to auscultation and percussion bilaterally with normal respiratory effort    Cardiovascular: normal rate, regular rhythm, normal S1 and S2 and no murmurs    Gastrointestinal: soft, non-tender, non-distended, normal bowel sounds, no masses or organomegaly    Extremities: no clubbing, no edema    Skin:No rashes or nodules noted. Neurologic:negative         Labs:  Lab Results   Component Value Date    WBC 10.8 03/10/2023    HGB 12.8 03/10/2023    HCT 38.7 03/10/2023     03/10/2023    CHOL 119 09/13/2022    TRIG 86 09/13/2022    HDL 47 09/13/2022    ALT 20 03/07/2023    AST 19 03/07/2023     (L) 03/10/2023    K 4.0 03/10/2023    CL 94 (L) 03/10/2023    CREATININE <0.5 (L) 03/10/2023    BUN 20 03/10/2023    CO2 24 03/10/2023    TSH 4.98 (H) 09/13/2022    INR 1.03 04/30/2021    LABA1C 6.5 03/07/2023    LABMICR <1.20 09/13/2022     No results found for: CKTOTAL, CKMB, CKMBINDEX, TROPONINI    Recent Imaging Results are Reviewed:  XR CHEST (2 VW)    Result Date: 3/8/2023  EXAMINATION: TWO XRAY VIEWS OF THE CHEST 3/7/2023 5:07 pm COMPARISON: 10/31/2022 HISTORY: ORDERING SYSTEM PROVIDED HISTORY: persistent cough TECHNOLOGIST PROVIDED HISTORY: Reason for exam:->persistent cough Reason for Exam: persistent cough FINDINGS: The heart size is within normal limits. There is no pneumothorax. There is linear opacity at the left lung base. A pleural effusion is not identified.  The bones are demineralized. There is mild to moderate degenerative change throughout the spine. Calcification of the abdominal aorta is noted. No evidence of edema or pneumonia. XR HAND RIGHT (MIN 3 VIEWS)    Result Date: 2/27/2023  Site: Hildreth Dancer #: [id number] #: [id number]: BNEDAccount #: [de-identified] #: [id number] #: [id number]: XR HAND RIGHT PA LATERAL AND OBLIQUEExam Date/Time: 02/27/2023 02:25 PMAdmitting Diagnosis: PainReason for Exam: Pain Dictated by: Ely Felix FEDERICO: 02/27/2023 03:48 PMT: This document is confidential medical information. Unauthorized disclosure or use of this information is prohibited by law. If you are not the intended recipient of this document, please advise  us by calling immediately [phone]. Impression/Conclusion below HISTORY:  Pain  PT Fell RT Hand swollen hematoma anteriorly and distorted COMPARISON: None NOTE:  If there are questions about the content of this report, please contact [address] [facility] radiology by calling [phone] FINDINGS:                 BONES:  Partly demineralized. Acute mildly comminuted fractures base of second, third, fourth, and fifth proximal phalanges. Metacarpal phalangeal joint involvement third through fifth proximal phalanges and questionable of the second proximal phalanx. Mild impaction and angulation of each fracture. Partial joint subluxation without dislocation. Ulnar deviation of the third finger which may or may not be acute. JOINTS:  No periarticular erosions. Joint space narrowing, sclerosis, and osteophyte formation  consistent with osteoarthritis: Severe first carpometacarpal, moderate radiocarpal and distal radioulnar joint, mild several interphalangeal joints. SOFT TISSUES:  Unremarkable OTHER:  None  IMPRESSION:  Acute mildly comminuted fractures base of second through fifth proximal phalange ease.  Intra-articular involvement third through fifth metacarpal phalangeal joints and possible of the second metacarpal phalangeal joint. Right hand and wrist osteoarthritis, most severe of the first carpometacarpal joint. SIGNED BY: Carrol Koyanagi, MD on 2/27/2023  3:45 PM    [address] [facility] [phone] [facility] Call Center: [phone]        CT HEAD WO CONTRAST    Result Date: 2/27/2023  Site: Dione Yi #: [id number] #: [id number]: Sari Rush #: [de-identified] #: [id number] #: [id number]: CT HEAD WO CONTRASTExam Date/Time: 02/27/2023 02:20 PMAdmitting Diagnosis: Head trauma, mod-severeReason for Exam: Head trauma, mod-severe Dictated by: Sai Rodriguez FEDERICO: 02/27/2023 03:04 PMT: This document is confidential medical information. Unauthorized disclosure or use of this information is prohibited by law. If you are not the intended recipient of this document, please advise us by calling immediately [phone]. Impression/Conclusion below HISTORY:   Head trauma, mod-severe Head trauma, mod-severe COMPARISON:  None TECHNIQUE:  Noncontrast multiplanar CT images of the head NOTE:  If there are questions about the content of this report, please contact [address] [place] [address] radiology by calling [phone] FINDINGS: BRAIN PARENCHYMA: No acute intraparenchymal hemorrhage or mass effect. Gray-white matter differentiation is maintained. Scattered areas of low attenuation are present throughout the periventricular and posterior subcortical white matter. Remote lacunar infarct within the left basal ganglia. VENTRICLES/SULCI: Unremarkable. No hydrocephalus or midline shift EXTRA-AXIAL SPACES:  Unremarkable PARANASAL SINUSES/MASTOIDS: Mild mucosal thickening within the right maxillary sinus. Mastoid air cells are clear. BONES:  Unremarkable OTHER: None IMPRESSION: No acute intracranial abnormality.  Scattered areas of low-attenuation throughout the periventricular and posterior subcortical white matter are most likely related to chronic small vessel ischemia. Remote infarct within the left basal ganglia. SIGNED BY: Yadiel Hylton MD on 2/27/2023  3:01 PM    [address] [facility] [phone] - [address] [facility]: [phone]        CT CERVICAL SPINE WO CONTRAST    Result Date: 2/27/2023  Site: [place] Mcginnis #: [id number] #: [id number]: BNEDAccount #: [de-identified] #: [id number] #: [id number]: CT CERVICAL SPINE WO CONTRASTExam Date/Time: 02/27/2023 02:20 PMAdmitting Diagnosis: Head trauma, mod-severeReason for Exam: Head trauma, mod-severe Dictated by: Marlene Juarez FEDERICO: 02/27/2023 02:59 PMT: This document is confidential medical information. Unauthorized disclosure or use of this information is prohibited by law. If you are not the intended recipient of this document, please advise us by calling immediately [phone]. Impression/Conclusion below HISTORY:  Head trauma, mod-severe  COMPARISON: None TECHNIQUE:  Axial CT images with coronal and sagittal reconstructions of the cervical spine NOTE:  If there are questions about the content of this report, please contact [address] [place] [address] radiology by calling [phone] FINDINGS: ALIGNMENT: No abnormal curvature BONES: Unremarkable. No aggressive osseous lesion or fracture SOFT TISSUES:  Unremarkable DISC LEVELS: Multilevel degenerative disc disease, facet arthropathy and uncovertebral spurring. 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 Multilevel bilateral neural foraminal stenoses. OTHER: Dilated/markedly large caliber of a right internal jugular vein. IMPRESSION: No CT evidence of acute fracture or traumatic malalignment of the cervical spine. 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  SIGNED BY: Anita To MD on 2/27/2023  2:56 PM    [address] [facility] [phone] - 2011 [facility]: [phone]        CT HAND RIGHT WO CONTRAST    Result Date: 2/28/2023  EXAMINATION: CT OF THE RIGHT HAND WITHOUT CONTRAST 2/28/2023 3:00 pm TECHNIQUE: CT of the right hand was performed without the administration of intravenous contrast.  Multiplanar reformatted images are provided for review. Automated exposure control, iterative reconstruction, and/or weight based adjustment of the mA/kV was utilized to reduce the radiation dose to as low as reasonably achievable. COMPARISON: None. HISTORY ORDERING SYSTEM PROVIDED HISTORY: Closed fracture of multiple phalanges of digit of hand, initial encounter TECHNOLOGIST PROVIDED HISTORY: [facility] Reason for exam:->evaluate for joint dislocation Reason for exam:->multiple phalanx fractures Reason for Exam: evaluate for joint dislocation; multiple phalanx fractures FINDINGS: Bones: The thumb is intact. Osteoarthritic changes which are advanced but no visualized fracture. The 2nd metacarpal is intact. Minimal sclerosis in the metacarpal head but no visualized fracture or dislocation. The proximal phalanx of the 2nd digit is fractured and angulated. The fracture does not appear to extend to the articular surface. Negative for dislocation. Osteoarthritic changes at the proximal and distal interphalangeal joints of the 2nd digit. The 3rd digit shows a fracture the proximal 3rd metacarpal.  This is a Y-shaped fracture. Extensive articular surface in the posterior osseous fragment is mildly posteriorly displaced. Negative for dislocation. Fracture at the dorsal aspect of the hamate which does extend to the articular surface but negative for dislocation. Fracture also at the posterior aspect of the capitate. It is tilted with respect to the lunate but no dislocation. Comminuted fracture of the proximal phalanx of the middle digit. The fracture is angulated but does not definitely extend to taking ower surface. Negative for dislocation. Osteoarthritic changes at the proximal and distal interphalangeal joints. The 4th metacarpal is intact. Fracture of the proximal aspect of the proximal phalanx.   Is angulated. The fracture does involve the posterior aspect of the articular surface. The fracture is comminuted but negative for dislocation. It is mildly impacted. Osteoarthritic changes of the proximal and distal interphalangeal joints. The little finger shows the same fracture of the proximal aspect of the proximal phalanx. Fracture is comminuted and involves articular surface. Fracture fragments are mildly distracted. Negative for dislocation. Osteoarthritic change the proximal and distal interphalangeal joints. Negative for dislocation. The 5th metacarpal is not fractured. Multiple cysts within the lunate. Negative for dislocation. Radial subluxation at the 1st metacarpal the SPECT to the trapezium. This is related to advanced osteoarthritis. Multiple ossicles in this region. Scapholunate space is minimally prominent but not grossly widened. Ulnar positive variance. Small subchondral cysts within the distal radius. Soft Tissue: Large amount of soft tissue swelling as expected. No visualized gas in the soft tissues. No radiopaque foreign objects. As can be seen, the tendons are intact. 1. Comminuted angulated fractures of the 2nd through 5th proximal phalanges. 2. Fracture of the 3rd proximal metacarpal. 3. Fractures at the distal dorsal aspect of the capitate and the hamate. Lab Results   Component Value Date/Time    GLUCOSE 141 03/10/2023 04:28 AM     Lab Results   Component Value Date/Time    POCGLU 132 03/08/2023 11:16 AM     /68   Pulse 62   Temp 97.2 °F (36.2 °C) (Oral)   Resp 14   Ht 4' 10\" (1.473 m)   Wt 112 lb (50.8 kg)   SpO2 92%   BMI 23.41 kg/m²     Assessment and Plan:  Principal Problem:    Closed multiple fractures of right hand bones with routine healing -Established problem. Stable. Plan: cont with therapy. OT scores 14; recommending placement.   Pt has been accepted, but awaiting precert  Active Problems:    Reactive airway disease without complication  Plan: Continue present orders/plan. Unable to care for self  Plan: placement recommended by OT    Pure hypercholesterolemia -Established problem. Stable. Plan: Continue present orders/plan. Frequent falls    General weakness    Hypertension -Established problem. Stable. 102/68  Plan: Continue present orders/plan.        Case discussed with: jose mclaughlin  Tests ordered/reviewed: cbc, bmp,            (Please note that portions of this note were completed with a voice recognition program.  Efforts were made to edit the dictations but occasionally words are mis-transcribed.)        Kevin Stanford MD  3/10/2023

## 2023-03-10 NOTE — PROGRESS NOTES
Patient Name: Pam Ro  Date:2022  : 1951  [x]  Patient  Verified  Payor: Yancy Stevenson / Plan: [place] & B / Product Type: Medicare /    Total Treatment Time (min): 45  1:1 Treatment Time ( [address] only): 45   Ehsan Mendoza MD  1. Nondisplaced fracture of glenoid cavity of scapula, left shoulder, subsequent encounter for fracture with routine healing  2. Acute pain of left shoulder  3. Traumatic complete tear of left rotator cuff, subsequent encounter          Subjective:    Patient is a 70-year-old female referred to physical therapy by Dr. Arcelia Sood with a diagnosis of a left glenoid fracture with a rotator cuff tear suffered as a result of a ground-level fall in late 2022. 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 She has had post injury x-rays and MRI which confirmed her glenoid fracture. She does have a full-thickness supraspinatus tear as well. She states all ADL activity is certainly affected. She states sleep is interrupted by her pain and difficulty finding a comfortable position. She has difficulty with basic shower and dressing tasks. She is right-handed. She reports that her recovery plan is somewhat complicated as she is anticipating a left total knee replacement . Based on Dr. Salas Held office dictation, the hope is to manage the fracture portion of her injury with sling and passive range of motion only until that time. The plan then is to continue with her knee surgery with the thought that she may need further shoulder surgery at some point down the road. She does live in a trilevel home. She is working part-time as  and is currently able to work from home. She rates pain anywhere from a 0 to an 8/10. She is compliant with sling wear when in public. She would like to be active with her 11and 9year-old grandchildren.   Remainder of past medical history Spoke with Dr Ahmet Munson at [facility]   to do a PEER to [address] [place] [address] . He is putting a pause on this decision  up to 2.30 . This patient is an obvious fall risk . She just  broke four bones in her dominant right hand and she uses a walker to get around  she can not go to her regular  home environment she lives by herself . She has family members in the city who all work full time .  She has a surgery of 4 dispaced intraarticular phalangeal fractures of her right dominant hand  with scheduled surgery on Tuesday 3/14/2023 and she needs  aggressive PT OT sanjana prior to that or else she runs a big risk of her flexor tendons getting stuck in hematoma of hand yielding right dominant hand totally functionless , going to her independent home environment poses a clear and present danger to her safety and well being and must therefore go to a SNF .just few weeks ago she had another fall  with broken ribs  so if she goes home in her environment , OT note clearly rated her AM-PAC score at 14/24 against it and medication list can otherwise be reviewed per the EHR. Objective:    Posture: Rounded shoulders  Palpation: Tender to palpation throughout the superior glenohumeral joint. Strength: Full strength testing of the left shoulder not assessed due to her injury restrictions. Wrist and  strength are intact. Left Shoulder PROM assessed in supine:    Elev: 75 degrees with complaints of pain    ER: 35 degrees with complaints of pain    IR: 35 degrees with complaints of pain  Right Shoulder AROM:    Elev: 145 degrees    ER: Cervical thoracic junction    IR: Thoracic spine  Bilateral elbow from 0 to 140 degrees  Special Testing: Not assessed due to injury restrictions      Ex: 10 min  Instructed patient in a home exercise program and provided patient with written visual handouts. Man: 15 min  GH mobilization to the posterior/inferior capsule in combination with passive range of motion into all directions as tolerated. NMR:  min      All questions were addressed. Assessment:  Patient presents with increased pain and decreased range of motion strength and mobility consistent with left shoulder pain with glenoid fracture and rotator cuff injury. Long-term goals. 8 weeks  1. Patient will demonstrate improved AROM to with at least 95% or greater as compared to the contralateral side to assist with home/work/community/recreational ADL activity when MD orders allow for AROM. 2.  Patient will report pain to be consistently less than or equal to 1/10 with all home/work/community/recreational ADL activity. Short-term goals. 2 visits. 1.  Patient will report and demonstrate independence with a home exercise program.    Plan:  Plan of care: Physical therapy consisted of frequency of 1-2/week for the next 4-6 weeks.   Physical therapy will consist of therapeutic exercise, modalities, patient education, neuromuscular reeducation, manual therapy, therapeutic activity, dry needling, and instruction in home exercise program as appropriate. Eval  Ex: 10  Man: 15  NMR:     The referring physician has reviewed and approved this evaluation and plan of care as noted by the electronic signature attached to note.     Raeann Ojeda DPT, ATC

## 2023-03-10 NOTE — CARE COORDINATION
Discharge Planning Note:    - Waldemar Thornton on 07/66/1703    Plan Auth ID: [id number]    Juli Channel ID: [id number]    Dates:    Next Review Date:    Status: PENDING as of 1615 on 03/10/2023    - Note: [facility] intended to deny the claim, Dr. Zeeshan Martin completed a [id number], updated clinical documentation submitted to [facility], Pre-Cert still pending as of [phone] on 03/10/203    Will continue to follow.     SARAHY MeloN RN    [facility]  Phone: [phone]      SEE NOTES

## 2023-03-10 NOTE — PROGRESS NOTES
[facility] Orthopedic Surgery  Plan of Care Note      Splint removed to continue to allow skin to dry. Skin condition improving. Plan:  -Would like to leave her hand open to air as much as possible during the day. -She is advised no use of the right hand including no range of motion or lifting, pushing, pulling.  -The splint at bedside is to be reapplied in the evening for her to sleep in with an Ace wrap.  -Please remove this in the morning and leave open to the air for majority the day.   -Would like to reapply the splint during the day if she is ambulating or getting out of bed  - Continue elevation  - Pain control    WILY Faria - CNP 3/10/2023 10:46 AM

## 2023-03-11 LAB
ANION GAP SERPL CALCULATED.3IONS-SCNC: 10 MMOL/L (ref 3–16)
BASOPHILS ABSOLUTE: 0 K/UL (ref 0–0.2)
BASOPHILS RELATIVE PERCENT: 0.5 %
BUN BLDV-MCNC: 17 MG/DL (ref 7–20)
CALCIUM SERPL-MCNC: 9 MG/DL (ref 8.3–10.6)
CHLORIDE BLD-SCNC: 101 MMOL/L (ref 99–110)
CO2: 23 MMOL/L (ref 21–32)
CREAT SERPL-MCNC: <0.5 MG/DL (ref 0.6–1.2)
EOSINOPHILS ABSOLUTE: 0.1 K/UL (ref 0–0.6)
EOSINOPHILS RELATIVE PERCENT: 1.5 %
GFR SERPL CREATININE-BSD FRML MDRD: >60 ML/MIN/{1.73_M2}
GLUCOSE BLD-MCNC: 125 MG/DL (ref 70–99)
GLUCOSE BLD-MCNC: 139 MG/DL (ref 70–99)
GLUCOSE BLD-MCNC: 157 MG/DL (ref 70–99)
GLUCOSE BLD-MCNC: 161 MG/DL (ref 70–99)
GLUCOSE BLD-MCNC: 172 MG/DL (ref 70–99)
HCT VFR BLD CALC: 39.8 % (ref 36–48)
HEMOGLOBIN: 12.9 G/DL (ref 12–16)
LYMPHOCYTES ABSOLUTE: 2 K/UL (ref 1–5.1)
LYMPHOCYTES RELATIVE PERCENT: 23.1 %
MCH RBC QN AUTO: 29.3 PG (ref 26–34)
MCHC RBC AUTO-ENTMCNC: 32.4 G/DL (ref 31–36)
MCV RBC AUTO: 90.5 FL (ref 80–100)
MONOCYTES ABSOLUTE: 0.9 K/UL (ref 0–1.3)
MONOCYTES RELATIVE PERCENT: 10.8 %
NEUTROPHILS ABSOLUTE: 5.7 K/UL (ref 1.7–7.7)
NEUTROPHILS RELATIVE PERCENT: 64.1 %
PDW BLD-RTO: 14 % (ref 12.4–15.4)
PERFORMED ON: ABNORMAL
PLATELET # BLD: 259 K/UL (ref 135–450)
PMV BLD AUTO: 7 FL (ref 5–10.5)
POTASSIUM SERPL-SCNC: 3.9 MMOL/L (ref 3.5–5.1)
RBC # BLD: 4.39 M/UL (ref 4–5.2)
SODIUM BLD-SCNC: 134 MMOL/L (ref 136–145)
WBC # BLD: 8.8 K/UL (ref 4–11)

## 2023-03-11 PROCEDURE — 94640 AIRWAY INHALATION TREATMENT: CPT

## 2023-03-11 PROCEDURE — 94760 N-INVAS EAR/PLS OXIMETRY 1: CPT

## 2023-03-11 PROCEDURE — 36415 COLL VENOUS BLD VENIPUNCTURE: CPT

## 2023-03-11 PROCEDURE — 6370000000 HC RX 637 (ALT 250 FOR IP): Performed by: INTERNAL MEDICINE

## 2023-03-11 PROCEDURE — 80048 BASIC METABOLIC PNL TOTAL CA: CPT

## 2023-03-11 PROCEDURE — G0378 HOSPITAL OBSERVATION PER HR: HCPCS

## 2023-03-11 PROCEDURE — 85025 COMPLETE CBC W/AUTO DIFF WBC: CPT

## 2023-03-11 PROCEDURE — 96372 THER/PROPH/DIAG INJ SC/IM: CPT

## 2023-03-11 PROCEDURE — 6360000002 HC RX W HCPCS: Performed by: INTERNAL MEDICINE

## 2023-03-11 RX ADMIN — THERA TABS 1 TABLET: TAB at 09:44

## 2023-03-11 RX ADMIN — ENOXAPARIN SODIUM 30 MG: 100 INJECTION SUBCUTANEOUS at 09:54

## 2023-03-11 RX ADMIN — FLUTICASONE PROPIONATE 1 SPRAY: 50 SPRAY, METERED NASAL at 09:55

## 2023-03-11 RX ADMIN — DOXEPIN HYDROCHLORIDE 50 MG: 25 CAPSULE ORAL at 20:16

## 2023-03-11 RX ADMIN — Medication 2000 UNITS: at 10:04

## 2023-03-11 RX ADMIN — METOPROLOL TARTRATE 100 MG: 50 TABLET ORAL at 20:16

## 2023-03-11 RX ADMIN — BENZONATATE 100 MG: 100 CAPSULE ORAL at 21:44

## 2023-03-11 RX ADMIN — ALOGLIPTIN 12.5 MG: 12.5 TABLET, FILM COATED ORAL at 09:44

## 2023-03-11 RX ADMIN — VALSARTAN 320 MG: 160 TABLET, FILM COATED ORAL at 09:45

## 2023-03-11 RX ADMIN — METOPROLOL TARTRATE 100 MG: 50 TABLET ORAL at 09:44

## 2023-03-11 RX ADMIN — ATORVASTATIN CALCIUM 10 MG: 10 TABLET, FILM COATED ORAL at 20:16

## 2023-03-11 RX ADMIN — CARBOXYMETHYLCELLULOSE SODIUM 1 DROP: 10 GEL OPHTHALMIC at 20:17

## 2023-03-11 RX ADMIN — ACETAMINOPHEN 650 MG: 325 TABLET ORAL at 20:23

## 2023-03-11 RX ADMIN — CYANOCOBALAMIN TAB 1000 MCG 1000 MCG: 1000 TAB at 09:55

## 2023-03-11 RX ADMIN — HYDROCHLOROTHIAZIDE 12.5 MG: 25 TABLET ORAL at 09:44

## 2023-03-11 RX ADMIN — AMLODIPINE BESYLATE 10 MG: 5 TABLET ORAL at 09:43

## 2023-03-11 RX ADMIN — ASPIRIN 81 MG: 81 TABLET, COATED ORAL at 09:44

## 2023-03-11 RX ADMIN — Medication 2 PUFF: at 07:12

## 2023-03-11 RX ADMIN — Medication 2 PUFF: at 21:12

## 2023-03-11 RX ADMIN — MONTELUKAST SODIUM 10 MG: 10 TABLET, FILM COATED ORAL at 09:44

## 2023-03-11 RX ADMIN — TIOTROPIUM BROMIDE INHALATION SPRAY 2 PUFF: 3.12 SPRAY, METERED RESPIRATORY (INHALATION) at 21:16

## 2023-03-11 RX ADMIN — Medication 2 TABLET: at 10:06

## 2023-03-11 NOTE — CARE COORDINATION
Per Pierre Flanagan, the peer to peer was completed and the denial was overturned. The patient can transfer to the facility at discharge.

## 2023-03-11 NOTE — PROGRESS NOTES
Patient resting in chair, states she has no needs at this time. A&O X4, Call light within reach, no further needs at this time. Patient has refused ace wrap and splint today, she states it's uncomfortable. I offered PRN pain medication, she states she'll let me know. Report given to CARMEN Geiger.

## 2023-03-11 NOTE — PLAN OF CARE
Problem: Chronic Conditions and Co-morbidities  Goal: Patient's chronic conditions and co-morbidity symptoms are monitored and maintained or improved  3/11/2023 0757 by Margot Aj RN  Outcome: Progressing  3/10/2023 2256 by Dena Rodriguez RN  Outcome: Progressing     Problem: Discharge Planning  Goal: Discharge to home or other facility with appropriate resources  3/11/2023 0757 by Margot Aj RN  Outcome: Progressing  3/10/2023 2256 by Dena Rodriguez RN  Outcome: Progressing

## 2023-03-11 NOTE — PLAN OF CARE
INFECTIOUS DISEASE PROGRESS NOTE    Vickie Francisco  29 year old female  MRN : [id number]    Date: 10/12/2022     Attending physician: Chepe Friedman MD    Reason for consult / chief complaint: left breast implant infection    Interval history: no acute events overnight. WBC normalized.    Subjective: denies left breast pain, swelling, fevers, chills, sweats. Tolerating ABX without rash, swelling.    Vitals:   Vitals:    10/12/22 0830   BP: 120/83   Pulse: 92   Resp: 18   Temp: 98.4 °F (36.9 °C)       Physical Examination:  General: Awake, Alert, Oriented x 3. No acute distress. Well developed, well nourished.   HEENT: Head is normocephalic, atraumatic. PERLLA. No scleral icterus. Oral mucosa moist without lesions. Good dentition.   Neck: Supple, No LAD. No thyromegaly.  Left breast: exam deferred today.  Lungs: NAD, Clear to auscultation bilaterally. No wheezes, crackles, or rhonchi. No accessory muscle use.   Heart: Regular rate and rhythm. No murmurs, gallops, or rubs.  Abdomen: Soft, positive bowel sounds, Non tender, Non distended. No masses or hepatosplenomegaly appreciated.   Musculoskeletal: No clubbing, cyanosis, or edema in bilateral lower extremities.  Skin: Warm and dry, No lesions, rashes, or ulcers.  Neurological: CN II - XII grossly intact. Grossly non focal.  Psych: appropriate mood and affect    Current medications: Reviewed    Laboratory data:    Recent Labs   Lab 10/12/22  0424 10/11/22  0644 10/10/22  0451   WBC 9.3 11.9* 21.9*   HCT 30.1* 33.0* 37.3   HGB 9.8* 11.0* 12.3    228 237   SODIUM 141 142 138   POTASSIUM 3.5 3.6 4.1   CHLORIDE 106 108 104   CO2 26 25 23   CALCIUM 8.5 8.9 9.5   GLUCOSE 100* 87 116*   BUN 9 9 9   CREATININE 0.74 0.75 0.72   AST 16 11 16   GPT 32 22 22   ALKPT 58 63 82   BILIRUBIN 0.1* 0.2 0.4   ALBUMIN 2.5* 2.8* 3.2*   PHOS 4.2 3.0  --      Imaging: Reviewed  CT chest 10/8:   1. Amaris-implant stranding is identified surrounding the left implant.  Hounsfield units  Problem: Chronic Conditions and Co-morbidities  Goal: Patient's chronic conditions and co-morbidity symptoms are monitored and maintained or improved  Outcome: Progressing     Problem: Discharge Planning  Goal: Discharge to home or other facility with appropriate resources  Outcome: Progressing     Problem: Safety - Adult  Goal: Free from fall injury  Outcome: Progressing     Problem: ABCDS Injury Assessment  Goal: Absence of physical injury  Outcome: Progressing     Problem: Pain  Goal: Verbalizes/displays adequate comfort level or baseline comfort level  Outcome: Progressing for this beverly-implant collection are approximately 15 to  30HU. No definitive capsule surrounding this fluid or enhancement  surrounding this fluid is seen. The breast capsule appears intact. This  stranding surrounding this left breast implant is definitively different  compared to the right side. This may represent fluid or old blood products. Pus is also in the differential. Either way, this likely represents  inflammatory or infectious etiology.  2. Additionally, beverly-implant fluid can be seen with textured implants and  anaplastic lymphoma, however, rare.  3. The fibroglandular tissue of both breasts appear to be symmetric. It also appears similar to the lower cuts of the abdominal CT from May 2022.  4. Additional note is made of a left axillary lymph node measuring 14 mm in short axis dimension favored reactive.  5. Underlying pectoralis muscles medially demonstrate mild thickening of the left pectoralis muscle compared to the right. While this may be secondary to slice selection and positioning, this may also be secondary mild reactive edema.     Left breast US 10/9:   Trace fluid and subcutaneous edema surrounding the left breast prosthesis in the upper outer quadrant. This is nonspecific. No evidence of abscess.        Culture data: Reviewed  Blood 10/8: NGTD  Left breast bacterial OR 10/9: group G strep  Left breast AFB OR 10/9: NGTD (smear negative)     COVID status: negative 10/8. Not vaccinated.     Isolation: None     Antimicrobials:   Vancomycin 10/8 - current   Clindamycin 10/8 - 10/9   Aztreonam 10/9 -10/10  Cefepime 10/10 - 10/11  Ceftriaxone 10/11 - current        Assessment / Diagnoses:  1. Sepsis 2/2 infected left breast implant and overlying cellulitis   -presents with left breast pain, swelling and systemic symptoms.   -CT chest with beverly-prosthetic fluid and stranding concerning for infection.   -plastic surgery  (Dr. Patino) performed left breast implant removal and inferolateral  superficial capsulotomy 10/9/22. Roughly 30cc purulent fluid encountered. Culture group G strep thus far. AFB culture negative.     2. Fever and leukocytosis, due to above - resolved    3. Hx bilateral breast augmentation surgeries   -per prior surgical notes, [place] breast [place] and [place] in 2015 ([place]), 2018 ([place]), [place]   -developed left breast infection April and August 2021 that required antibiotics.      4. Obesity     5. PCN allergy   -reported as hives in chart.   -per patient, noted rash on her face when receiving PCN 7 years ago. Denies raised welts/hives, angioedema, airway compromise or other evidence of anaphylaxis.   -tolerated ceftriaxone and cefepime this admission.      Plan / Recommendations:  1. Vanco and ceftriaxone while inpatient (POD #3).   2. No new growth on cultures.   3. Ok to discharge from ID standpoint on Keflex x10 days as outlined below.     INFECTIOUS DISEASE DISCHARGE PLAN:   Antimicrobials: Keflex 500mg PO QID x10 days upon discharge   ID provider: Matt Ferguson MD  Outpatient follow-up: as needed with ID  Office phone #[phone]    Discussed with the patient, IM resident team and Dr. Ferguson.     Eric Arzola PA-C  Infectious Disease  Pager: [phone]  Office: [phone]

## 2023-03-11 NOTE — PROGRESS NOTES
Progress Note - Dr. Susy Nicole - Internal Medicine  PCP: Avis Mckee MD [address] [facility] / [facility] [phone]    Hospital Day: 0  Code Status: Full Code  Current Diet: ADULT DIET; Regular; 3 carb choices (45 gm/meal); Vegetarian (Lacto-Ovo)        CC: follow up on medical issues    Subjective: Roman Jefferson is a 80 y.o. female. Pt seen and examined  Chart reviewed since last visit, labs and imaging below      Doing ok  Awaiting pre-cert  Dr Daniel Poe has graciously done P2P call        Review of Systems: (1 system for EPF, 2-9 for detailed, 10+ for comprehensive)  Constitutional: Negative for chills and fever. HENT: Negative for dental problem, nosebleeds and rhinorrhea. Eyes: Negative for photophobia and visual disturbance. Respiratory: Negative for cough, chest tightness and shortness of breath. Cardiovascular: Negative for chest pain and leg swelling. Gastrointestinal: Negative for diarrhea, nausea and vomiting. Endocrine: Negative for polydipsia and polyphagia. Genitourinary: Negative for frequency, hematuria and urgency. Musculoskeletal: Negative for back pain and myalgias. Skin: Negative for rash. Allergic/Immunologic: Negative for food allergies. Neurological: Negative for dizziness, seizures, syncope and facial asymmetry. Hematological: Negative for adenopathy. Psychiatric/Behavioral: Negative for dysphoric mood. The patient is not nervous/anxious. I have reviewed the patient's medical and social history in detail and updated the computerized patient record. To recap: She  has a past medical history of Arthritis, Diabetes mellitus ([address] HYPERCHOLESTERAEMIA, Hypertension, and TIA (transient ischaemic attack). . She  has a past surgical history that includes colectomy; joint replacement (04/22/2010); Total knee arthroplasty (8/19/2010); and Patella fracture surgery (Left, 5/2/2021). . She  reports that she has never smoked.  She has never used smokeless tobacco. She reports that she does not drink alcohol and does not use drugs. .        Active Hospital Problems    Diagnosis Date Noted    Closed multiple fractures of right hand bones with routine healing [S62.91XD] 03/07/2023     Priority: Medium    Reactive airway disease without complication [W00.029] 53/38/6856     Priority: Medium    Unable to care for self [Z78.9]      Priority: Medium    Pure hypercholesterolemia [E78.00]      Priority: Medium    Frequent falls [R29.6]      Priority: Medium    General weakness [R53.1]      Priority: Medium    Hypertension [I10] 04/22/2010       Current Facility-Administered Medications: mometasone-formoterol (DULERA) 200-5 MCG/ACT inhaler 2 puff, 2 puff, Inhalation, BID **AND** tiotropium (SPIRIVA RESPIMAT) 2.5 MCG/ACT inhaler 2 puff, 2 puff, Inhalation, QPM  hydrALAZINE (APRESOLINE) injection 10 mg, 10 mg, IntraVENous, Q6H PRN  0.9 % sodium chloride bolus, 500 mL, IntraVENous, PRN  potassium chloride (KLOR-CON M) extended release tablet 40 mEq, 40 mEq, Oral, PRN **OR** potassium bicarb-citric acid (EFFER-K) effervescent tablet 40 mEq, 40 mEq, Oral, PRN **OR** potassium chloride 10 mEq/100 mL IVPB (Peripheral Line), 10 mEq, IntraVENous, PRN  enoxaparin Sodium (LOVENOX) injection 30 mg, 30 mg, SubCUTAneous, Daily  HYDROcodone-acetaminophen (NORCO) 5-325 MG per tablet 1 tablet, 1 tablet, Oral, Q6H PRN  acetaminophen (TYLENOL) tablet 650 mg, 650 mg, Oral, Q4H PRN  acetaminophen (TYLENOL) tablet 650 mg, 650 mg, Oral, Q4H PRN  albuterol sulfate HFA (PROVENTIL;VENTOLIN;PROAIR) 108 (90 Base) MCG/ACT inhaler 2 puff, 2 puff, Inhalation, Q4H PRN  amLODIPine (NORVASC) tablet 10 mg, 10 mg, Oral, Daily  aspirin EC tablet 81 mg, 81 mg, Oral, Daily  atorvastatin (LIPITOR) tablet 10 mg, 10 mg, Oral, Nightly  benzonatate (TESSALON) capsule 100 mg, 100 mg, Oral, BID PRN  calcium carb-cholecalciferol 250-3. 125 MG-MCG per tab 2 tablet, 2 tablet, Oral, Daily  carboxymethylcellulose PF (THERATEARS) 1 % ophthalmic gel 1 drop, 1 drop, Ophthalmic, Nightly  Vitamin D (CHOLECALCIFEROL) tablet 2,000 Units, 2,000 Units, Oral, Daily  doxepin (SINEQUAN) capsule 50 mg, 50 mg, Oral, Nightly  fluticasone (FLONASE) 50 MCG/ACT nasal spray 1 spray, 1 spray, Each Nostril, Daily  metoprolol tartrate (LOPRESSOR) tablet 100 mg, 100 mg, Oral, BID  montelukast (SINGULAIR) tablet 10 mg, 10 mg, Oral, Daily  multivitamin 1 tablet, 1 tablet, Oral, Daily  alogliptin (NESINA) tablet 12.5 mg, 12.5 mg, Oral, Daily  sodium chloride (OCEAN, BABY AYR) 0.65 % nasal spray 1 spray, 1 spray, Nasal, Q4H PRN  vitamin B-12 (CYANOCOBALAMIN) tablet 1,000 mcg, 1,000 mcg, Oral, Daily  dextrose bolus 10% 125 mL, 125 mL, IntraVENous, PRN **OR** dextrose bolus 10% 250 mL, 250 mL, IntraVENous, PRN  glucagon (rDNA) injection 1 mg, 1 mg, SubCUTAneous, PRN  dextrose 10 % infusion, , IntraVENous, Continuous PRN  valsartan (DIOVAN) tablet 320 mg, 320 mg, Oral, Daily **AND** hydroCHLOROthiazide (HYDRODIURIL) tablet 12.5 mg, 12.5 mg, Oral, Daily         Objective:  /80   Pulse 76   Temp 97.4 °F (36.3 °C) (Oral)   Resp 18   Ht 4' 10\" (1.473 m)   Wt 112 lb (50.8 kg)   SpO2 95%   BMI 23.41 kg/m²      Patient Vitals for the past 24 hrs:   BP Temp Temp src Pulse Resp SpO2   03/11/23 0915 137/80 97.4 °F (36.3 °C) Oral 76 18 95 %   03/11/23 0402 (!) 147/84 97.4 °F (36.3 °C) Oral 69 16 92 %   03/11/23 0045 110/65 98 °F (36.7 °C) Oral 75 16 95 %   03/10/23 2144 -- -- -- 76 16 97 %   03/10/23 2100 (!) 154/76 98 °F (36.7 °C) Oral 75 18 95 %       Patient Vitals for the past 96 hrs (Last 3 readings):   Weight   03/07/23 1910 112 lb (50.8 kg)           No intake or output data in the 24 hours ending 03/11/23 1046      Physical Exam: (2-7 system for EPF/Detailed, ?8 for Comprehensive)  /80   Pulse 76   Temp 97.4 °F (36.3 °C) (Oral)   Resp 18   Ht 4' 10\" (1.473 m)   Wt 112 lb (50.8 kg)   SpO2 95%   BMI 23.41 kg/m²   Constitutional: vitals as above: alert, appears stated age and cooperative    Psychiatric: normal insight and judgment, oriented to person, place, time, and general circumstances    Head: Normocephalic, without obvious abnormality, atraumatic    Eyes:lids and lashes normal, conjunctivae and sclerae normal and pupils equal, round, reactive to light and accomodation    EMNT: external ears normal, nares midline    Neck: no carotid bruit, supple, symmetrical, trachea midline and thyroid not enlarged, symmetric, no tenderness/mass/nodules     Respiratory: clear to auscultation and percussion bilaterally with normal respiratory effort    Cardiovascular: normal rate, regular rhythm, normal S1 and S2 and no murmurs    Gastrointestinal: soft, non-tender, non-distended, normal bowel sounds, no masses or organomegaly    Extremities: no clubbing, no edema    Skin:No rashes or nodules noted. Neurologic:negative         Labs:  Lab Results   Component Value Date    WBC 8.8 03/11/2023    HGB 12.9 03/11/2023    HCT 39.8 03/11/2023     03/11/2023    CHOL 119 09/13/2022    TRIG 86 09/13/2022    HDL 47 09/13/2022    ALT 20 03/07/2023    AST 19 03/07/2023     (L) 03/11/2023    K 3.9 03/11/2023     03/11/2023    CREATININE <0.5 (L) 03/11/2023    BUN 17 03/11/2023    CO2 23 03/11/2023    TSH 4.98 (H) 09/13/2022    INR 1.03 04/30/2021    LABA1C 6.5 03/07/2023    LABMICR <1.20 09/13/2022     No results found for: CKTOTAL, CKMB, CKMBINDEX, TROPONINI    Recent Imaging Results are Reviewed:  XR CHEST (2 VW)    Result Date: 3/8/2023  EXAMINATION: TWO XRAY VIEWS OF THE CHEST 3/7/2023 5:07 pm COMPARISON: 10/31/2022 HISTORY: ORDERING SYSTEM PROVIDED HISTORY: persistent cough TECHNOLOGIST PROVIDED HISTORY: Reason for exam:->persistent cough Reason for Exam: persistent cough FINDINGS: The heart size is within normal limits. There is no pneumothorax. There is linear opacity at the left lung base.   A pleural effusion is not identified. The bones are demineralized. There is mild to moderate degenerative change throughout the spine. Calcification of the abdominal aorta is noted. No evidence of edema or pneumonia. XR HAND RIGHT (MIN 3 VIEWS)    Result Date: 2/27/2023  Site: [facility] Yi #: [id number] #: [id number]: BNEDAccount #: [de-identified] #: [id number] #: [id number]: XR HAND RIGHT PA LATERAL AND OBLIQUEExam Date/Time: 02/27/2023 02:25 PMAdmitting Diagnosis: PainReason for Exam: Pain Dictated by: Manish Hackett FEDERICO: 02/27/2023 03:48 PMT: This document is confidential medical information. Unauthorized disclosure or use of this information is prohibited by law. If you are not the intended recipient of this document, please advise  us by calling immediately [phone]. Impression/Conclusion below HISTORY:  Pain  PT Fell RT Hand swollen hematoma anteriorly and distorted COMPARISON: None NOTE:  If there are questions about the content of this report, please contact [address] [facility] radiology by calling [phone] FINDINGS:                 BONES:  Partly demineralized. Acute mildly comminuted fractures base of second, third, fourth, and fifth proximal phalanges. Metacarpal phalangeal joint involvement third through fifth proximal phalanges and questionable of the second proximal phalanx. Mild impaction and angulation of each fracture. Partial joint subluxation without dislocation. Ulnar deviation of the third finger which may or may not be acute. JOINTS:  No periarticular erosions. Joint space narrowing, sclerosis, and osteophyte formation  consistent with osteoarthritis: Severe first carpometacarpal, moderate radiocarpal and distal radioulnar joint, mild several interphalangeal joints. SOFT TISSUES:  Unremarkable OTHER:  None  IMPRESSION:  Acute mildly comminuted fractures base of second through fifth proximal phalange ease.  Intra-articular involvement third through fifth metacarpal phalangeal joints and possible of the second metacarpal phalangeal joint. Right hand and wrist osteoarthritis, most severe of the first carpometacarpal joint. SIGNED BY: Yash Maguire MD on 2/27/2023  3:45 PM    [address] [facility] [phone] [facility] Call Center: [phone]        CT HEAD WO CONTRAST    Result Date: 2/27/2023  Site: [facility] #: [id number] #: [id number]: Henrietta Cage #: [de-identified] #: [id number] #: [id number]: CT HEAD WO CONTRASTExam Date/Time: 02/27/2023 02:20 PMAdmitting Diagnosis: Head trauma, mod-severeReason for Exam: Head trauma, mod-severe Dictated by: Talha Milton FEDERICO: 02/27/2023 03:04 PMT: This document is confidential medical information. Unauthorized disclosure or use of this information is prohibited by law. If you are not the intended recipient of this document, please advise us by calling immediately [phone]. Impression/Conclusion below HISTORY:   Head trauma, mod-severe Head trauma, mod-severe COMPARISON:  None TECHNIQUE:  Noncontrast multiplanar CT images of the head NOTE:  If there are questions about the content of this report, please contact [address] [place] [address] radiology by calling [phone] FINDINGS: BRAIN PARENCHYMA: No acute intraparenchymal hemorrhage or mass effect. Gray-white matter differentiation is maintained. Scattered areas of low attenuation are present throughout the periventricular and posterior subcortical white matter. Remote lacunar infarct within the left basal ganglia. VENTRICLES/SULCI: Unremarkable. No hydrocephalus or midline shift EXTRA-AXIAL SPACES:  Unremarkable PARANASAL SINUSES/MASTOIDS: Mild mucosal thickening within the right maxillary sinus. Mastoid air cells are clear. BONES:  Unremarkable OTHER: None IMPRESSION: No acute intracranial abnormality.  Scattered areas of low-attenuation throughout the periventricular and posterior subcortical white matter are most likely related to chronic small vessel ischemia. Remote infarct within the left basal ganglia. SIGNED BY: Olegario Oliva MD on 2/27/2023  3:01 PM    [facility] [phone] -  [address] [facility]: [phone]        CT CERVICAL SPINE WO CONTRAST    Result Date: 2/27/2023  Site: Felisha Arredondo #: [id number] #: [id number]: BNEDAccount #: [de-identified] #: [id number] #: [id number]: CT CERVICAL SPINE WO CONTRASTExam Date/Time: 02/27/2023 02:20 PMAdmitting Diagnosis: Head trauma, mod-severeReason for Exam: Head trauma, mod-severe Dictated by: Pam Washington FEDERICO: 02/27/2023 02:59 PMT: This document is confidential medical information. Unauthorized disclosure or use of this information is prohibited by law. If you are not the intended recipient of this document, please advise us by calling immediately [phone]. Impression/Conclusion below HISTORY:  Head trauma, mod-severe  COMPARISON: None TECHNIQUE:  Axial CT images with coronal and sagittal reconstructions of the cervical spine NOTE:  If there are questions about the content of this report, please contact [address] [place] [address] radiology by calling [phone] FINDINGS: ALIGNMENT: No abnormal curvature BONES: Unremarkable. No aggressive osseous lesion or fracture SOFT TISSUES:  Unremarkable DISC LEVELS: Multilevel degenerative disc disease, facet arthropathy and uncovertebral spurring. 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 Multilevel bilateral neural foraminal stenoses. OTHER: Dilated/markedly large caliber of a right internal jugular vein. IMPRESSION: No CT evidence of acute fracture or traumatic malalignment of the cervical spine. 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  SIGNED BY: Miko Shay MD on 2/27/2023  2:56 PM    [facility] [phone] -  [address] [facility]: [phone]        CT HAND RIGHT WO CONTRAST    Result Date: 2/28/2023  EXAMINATION: CT OF THE RIGHT HAND WITHOUT CONTRAST 2/28/2023 3:00 pm TECHNIQUE: CT of the right hand was performed without the administration of intravenous contrast.  Multiplanar reformatted images are provided for review. Automated exposure control, iterative reconstruction, and/or weight based adjustment of the mA/kV was utilized to reduce the radiation dose to as low as reasonably achievable. COMPARISON: None. HISTORY ORDERING SYSTEM PROVIDED HISTORY: Closed fracture of multiple phalanges of digit of hand, initial encounter TECHNOLOGIST PROVIDED HISTORY: [facility] Reason for exam:->evaluate for joint dislocation Reason for exam:->multiple phalanx fractures Reason for Exam: evaluate for joint dislocation; multiple phalanx fractures FINDINGS: Bones: The thumb is intact. Osteoarthritic changes which are advanced but no visualized fracture. The 2nd metacarpal is intact. Minimal sclerosis in the metacarpal head but no visualized fracture or dislocation. The proximal phalanx of the 2nd digit is fractured and angulated. The fracture does not appear to extend to the articular surface. Negative for dislocation. Osteoarthritic changes at the proximal and distal interphalangeal joints of the 2nd digit. The 3rd digit shows a fracture the proximal 3rd metacarpal.  This is a Y-shaped fracture. Extensive articular surface in the posterior osseous fragment is mildly posteriorly displaced. Negative for dislocation. Fracture at the dorsal aspect of the hamate which does extend to the articular surface but negative for dislocation. Fracture also at the posterior aspect of the capitate. It is tilted with respect to the lunate but no dislocation. Comminuted fracture of the proximal phalanx of the middle digit. The fracture is angulated but does not definitely extend to taking ower surface. Negative for dislocation. Osteoarthritic changes at the proximal and distal interphalangeal joints. The 4th metacarpal is intact.   Fracture of the proximal aspect of the proximal phalanx. Is angulated. The fracture does involve the posterior aspect of the articular surface. The fracture is comminuted but negative for dislocation. It is mildly impacted. Osteoarthritic changes of the proximal and distal interphalangeal joints. The little finger shows the same fracture of the proximal aspect of the proximal phalanx. Fracture is comminuted and involves articular surface. Fracture fragments are mildly distracted. Negative for dislocation. Osteoarthritic change the proximal and distal interphalangeal joints. Negative for dislocation. The 5th metacarpal is not fractured. Multiple cysts within the lunate. Negative for dislocation. Radial subluxation at the 1st metacarpal the SPECT to the trapezium. This is related to advanced osteoarthritis. Multiple ossicles in this region. Scapholunate space is minimally prominent but not grossly widened. Ulnar positive variance. Small subchondral cysts within the distal radius. Soft Tissue: Large amount of soft tissue swelling as expected. No visualized gas in the soft tissues. No radiopaque foreign objects. As can be seen, the tendons are intact. 1. Comminuted angulated fractures of the 2nd through 5th proximal phalanges. 2. Fracture of the 3rd proximal metacarpal. 3. Fractures at the distal dorsal aspect of the capitate and the hamate. Lab Results   Component Value Date/Time    GLUCOSE 139 03/11/2023 05:18 AM     Lab Results   Component Value Date/Time    POCGLU 125 03/11/2023 07:14 AM     /80   Pulse 76   Temp 97.4 °F (36.3 °C) (Oral)   Resp 18   Ht 4' 10\" (1.473 m)   Wt 112 lb (50.8 kg)   SpO2 95%   BMI 23.41 kg/m²     Assessment and Plan:  Principal Problem:    Closed multiple fractures of right hand bones with routine healing -Established problem. Stable. Plan: cont with therapy. OT scores 14; recommending placement.   Pt has been accepted, but awaiting precert  Active Problems:    Reactive airway disease without complication  Plan: Continue present orders/plan. Unable to care for self  Plan: placement recommended by OT    Pure hypercholesterolemia -Established problem. Stable. Plan: Continue present orders/plan. Frequent falls    General weakness    Hypertension -Established problem. Stable. 137/80  Plan: Continue present orders/plan.        Case discussed with: arnoldo,   Tests ordered/reviewed: cbc, bmp,            (Please note that portions of this note were completed with a voice recognition program.  Efforts were made to edit the dictations but occasionally words are mis-transcribed.)        Olesya Chambers MD  3/11/2023

## 2023-03-11 NOTE — PROGRESS NOTES
Shift assessment complete. Meds givne per eMAR. VSS. Pt ambulating with assistance to bathroom well. R hand rewrapped with splint for sleep per pt request. Call light in reach.  The care plan and education has been reviewed and mutually agreed upon with the patient.     Electronically signed by Marisa Sexton RN on 3/11/2023 at 1:20 AM

## 2023-03-11 NOTE — PROGRESS NOTES
Morning assessment, VS, and medications complete. Medications given per MAR, pt tolerated well. VS stable. Pt is A&O X4. Ate 100% of breakfast. Patient resting comfortably in her bed. I plugged all of her devices in for her. Once situated, I rested hand on a pillow and she states it feels okay. Pt is currently not wearing her splint or ace wrap, but wore it over night. Call light within reach, no further needs at this time.

## 2023-03-12 VITALS
HEART RATE: 71 BPM | TEMPERATURE: 97.9 F | OXYGEN SATURATION: 93 % | BODY MASS INDEX: 23.51 KG/M2 | HEIGHT: 58 IN | SYSTOLIC BLOOD PRESSURE: 125 MMHG | RESPIRATION RATE: 18 BRPM | WEIGHT: 112 LBS | DIASTOLIC BLOOD PRESSURE: 73 MMHG

## 2023-03-12 LAB
GLUCOSE BLD-MCNC: 146 MG/DL (ref 70–99)
PERFORMED ON: ABNORMAL

## 2023-03-12 PROCEDURE — 6370000000 HC RX 637 (ALT 250 FOR IP): Performed by: INTERNAL MEDICINE

## 2023-03-12 PROCEDURE — G0378 HOSPITAL OBSERVATION PER HR: HCPCS

## 2023-03-12 PROCEDURE — 96372 THER/PROPH/DIAG INJ SC/IM: CPT

## 2023-03-12 PROCEDURE — 6360000002 HC RX W HCPCS: Performed by: INTERNAL MEDICINE

## 2023-03-12 PROCEDURE — 94640 AIRWAY INHALATION TREATMENT: CPT

## 2023-03-12 RX ADMIN — METOPROLOL TARTRATE 100 MG: 50 TABLET ORAL at 08:28

## 2023-03-12 RX ADMIN — THERA TABS 1 TABLET: TAB at 08:33

## 2023-03-12 RX ADMIN — Medication 2 TABLET: at 09:58

## 2023-03-12 RX ADMIN — ASPIRIN 81 MG: 81 TABLET, COATED ORAL at 08:28

## 2023-03-12 RX ADMIN — Medication 2000 UNITS: at 08:28

## 2023-03-12 RX ADMIN — HYDROCHLOROTHIAZIDE 12.5 MG: 25 TABLET ORAL at 08:30

## 2023-03-12 RX ADMIN — MONTELUKAST SODIUM 10 MG: 10 TABLET, FILM COATED ORAL at 08:30

## 2023-03-12 RX ADMIN — ALOGLIPTIN 12.5 MG: 12.5 TABLET, FILM COATED ORAL at 08:29

## 2023-03-12 RX ADMIN — CYANOCOBALAMIN TAB 1000 MCG 1000 MCG: 1000 TAB at 08:34

## 2023-03-12 RX ADMIN — ENOXAPARIN SODIUM 30 MG: 100 INJECTION SUBCUTANEOUS at 08:35

## 2023-03-12 RX ADMIN — Medication 2 PUFF: at 07:42

## 2023-03-12 RX ADMIN — FLUTICASONE PROPIONATE 1 SPRAY: 50 SPRAY, METERED NASAL at 08:35

## 2023-03-12 RX ADMIN — VALSARTAN 320 MG: 160 TABLET, FILM COATED ORAL at 08:29

## 2023-03-12 RX ADMIN — AMLODIPINE BESYLATE 10 MG: 5 TABLET ORAL at 08:28

## 2023-03-12 NOTE — DISCHARGE SUMMARY
[facility] -- Physician Discharge Summary     Olena Colon  1935  MRN: [id number]    Admit Date: 3/7/2023  Discharge Date: No discharge date for patient encounter. Attending MD: Alfredito Milian MD  Discharging MD: Alfredito Milian MD  PCP: Kristopher Hunt MD [address] [phone]    Admission Diagnosis: Closed multiple fractures of right hand bones with routine healing [S62.91XD]  DISCHARGE DIAGNOSIS: same    Full Hospital Problem List:  AQUILES/Carito [place] 1106 Problems    Diagnosis Date Noted    Closed multiple fractures of right hand bones with routine healing [S62.91XD] 03/07/2023     Priority: Medium    Reactive airway disease without complication [F07.723] 18/79/4671     Priority: Medium    Unable to care for self [Z78.9]      Priority: Medium    Pure hypercholesterolemia [E78.00]      Priority: Medium    Frequent falls [R29.6]      Priority: Medium    General weakness [R53.1]      Priority: Medium    Hypertension [I10] 04/22/2010           Hospital Course:  26-year-old  [place] ([facility]) woman who was directly admitted to [address] [place] [address] at my intervention, has recently had multiple fractures in the right dominant hand. She lives by herself and she is getting around with the help of a walker. She was supposed to have percutaneous pinning and closed reduction of these fractures under anesthesia because of the local skin condition that looked very soggy because of recent exposure to moisture. Dr. Abdulaziz Colmenares has postponed the surgery by one week. In the meantime, the patient is unable to provide self-care and lives by  herself, so we decided to admit her for observation and do the PT  And OT evaluation and eventually skilled nursing facility.      Per Dr Waldron Pae; \"   Due to the poor skin condition, I have recommended that proceeding today with Closed Reduction & Percutaneous Pinning  would entail an unnecessarily high risk of skin complications and infection about the pin sites. I have recommended that we cancel the procedure for today until her skin is in appropriate condition. She and her family were in agreement. \"      Therapy does recommend SNF  Initially denied by insurance, but after peer to peer, this is reversed  Pt to transfer to [facility] [address] made during Hospitalization:  [address] [facility] [address] [address] [address]    Treatment team at time of Discharge: Treatment Team: Attending Provider: Kevin Stanford MD; Consulting Physician: Tl York MD; Utilization Reviewer: Mara Trevino, RN; Consulting Physician: Kevin Stanford MD; Utilization Reviewer: Mehreen Vines LPN; Registered Nurse: Stacia Sauer, RN; Registered Nurse: Catrachito Snyder RN    Imaging Results:  XR CHEST (2 VW)    Result Date: 3/8/2023  EXAMINATION: TWO XRAY VIEWS OF THE CHEST 3/7/2023 5:07 pm COMPARISON: 10/31/2022 HISTORY: ORDERING SYSTEM PROVIDED HISTORY: persistent cough TECHNOLOGIST PROVIDED HISTORY: Reason for exam:->persistent cough Reason for Exam: persistent cough FINDINGS: The heart size is within normal limits. There is no pneumothorax. There is linear opacity at the left lung base. A pleural effusion is not identified. The bones are demineralized. There is mild to moderate degenerative change throughout the spine. Calcification of the abdominal aorta is noted. No evidence of edema or pneumonia. XR HAND RIGHT (MIN 3 VIEWS)    Result Date: 2/27/2023  Site: [facility] [place] #: [id number] #: [id number]: BNEDAccount #: [de-identified] #: [id number] #: [id number]: XR HAND RIGHT PA LATERAL AND OBLIQUEExam Date/Time: 02/27/2023 02:25 PMAdmitting Diagnosis: PainReason for Exam: Pain Dictated by: Heavenly Wilson FEDERICO: 02/27/2023 03:48 PMT: This document is confidential medical information. Unauthorized disclosure or use of this information is prohibited by law. If you are not the intended recipient of this document, please advise  us by calling immediately [phone]. Impression/Conclusion below HISTORY:  Pain  PT Fell RT Hand swollen hematoma anteriorly and distorted COMPARISON: None NOTE:  If there are questions about the content of this report, please contact [address] [place] [address] radiology by calling [phone] FINDINGS:                 BONES:  Partly demineralized. Acute mildly comminuted fractures base of second, third, fourth, and fifth proximal phalanges. Metacarpal phalangeal joint involvement third through fifth proximal phalanges and questionable of the second proximal phalanx. Mild impaction and angulation of each fracture. Partial joint subluxation without dislocation. Ulnar deviation of the third finger which may or may not be acute. JOINTS:  No periarticular erosions. Joint space narrowing, sclerosis, and osteophyte formation  consistent with osteoarthritis: Severe first carpometacarpal, moderate radiocarpal and distal radioulnar joint, mild several interphalangeal joints. SOFT TISSUES:  Unremarkable OTHER:  None  IMPRESSION:  Acute mildly comminuted fractures base of second through fifth proximal phalange ease. Intra-articular involvement third through fifth metacarpal phalangeal joints and possible of the second metacarpal phalangeal joint. Right hand and wrist osteoarthritis, most severe of the first carpometacarpal joint.  SIGNED BY: Staci Bowens MD on 2/27/2023  3:45 PM    [address] [facility] [phone] [facility] Call Center: [phone]        CT HEAD WO CONTRAST    Result Date: 2/27/2023  Site: [place] [facility] #: [id number] #: [id number]: Main [facility] #: [de-identified] #: [id number] #: [id number]: CT HEAD WO CONTRASTExam Date/Time: 02/27/2023 02:20 PMAdmitting Diagnosis: Head trauma, mod-severeReason for Exam: Head trauma, mod-severe Dictated by: Chantal Tejada FEDERICO: 02/27/2023 03:04 PMT: This document is confidential medical information. Unauthorized disclosure or use of this information is prohibited by law. If you are not the intended recipient of this document, please advise us by calling immediately [phone]. Impression/Conclusion below HISTORY:   Head trauma, mod-severe Head trauma, mod-severe COMPARISON:  None TECHNIQUE:  Noncontrast multiplanar CT images of the head NOTE:  If there are questions about the content of this report, please contact [facility] [place] radiology by calling [phone] FINDINGS: BRAIN PARENCHYMA: No acute intraparenchymal hemorrhage or mass effect. Gray-white matter differentiation is maintained. Scattered areas of low attenuation are present throughout the periventricular and posterior subcortical white matter. Remote lacunar infarct within the left basal ganglia. VENTRICLES/SULCI: Unremarkable. No hydrocephalus or midline shift EXTRA-AXIAL SPACES:  Unremarkable PARANASAL SINUSES/MASTOIDS: Mild mucosal thickening within the right maxillary sinus. Mastoid air cells are clear. BONES:  Unremarkable OTHER: None IMPRESSION: No acute intracranial abnormality. Scattered areas of low-attenuation throughout the periventricular and posterior subcortical white matter are most likely related to chronic small vessel ischemia. Remote infarct within the left basal ganglia. SIGNED BY: Clovis Penn MD on 2/27/2023  3:01 PM    [address] [facility] [phone] - [address] [facility] Street: [phone]        CT CERVICAL SPINE WO CONTRAST    Result Date: 2/27/2023  Site: [place] #: [id number] #: [id number]: BNEDAccount #: [de-identified] #: [id number] #: [id number]: CT CERVICAL SPINE WO CONTRASTExam Date/Time: 02/27/2023 02:20 PMAdmitting Diagnosis: Head trauma, mod-severeReason for Exam: Head trauma, mod-severe Dictated by: Nura Du FEDERICO: 02/27/2023 02:59 PMT: This document is confidential medical information.   Unauthorized disclosure or use of this information is prohibited by law. If you are not the intended recipient of this document, please advise us by calling immediately [phone]. Impression/Conclusion below HISTORY:  Head trauma, mod-severe  COMPARISON: None TECHNIQUE:  Axial CT images with coronal and sagittal reconstructions of the cervical spine NOTE:  If there are questions about the content of this report, please contact [facility] radiology by calling [phone] FINDINGS: ALIGNMENT: No abnormal curvature BONES: Unremarkable. No aggressive osseous lesion or fracture SOFT TISSUES:  Unremarkable DISC LEVELS: Multilevel degenerative disc disease, facet arthropathy and uncovertebral spurring. 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 Multilevel bilateral neural foraminal stenoses. OTHER: Dilated/markedly large caliber of a right internal jugular vein. IMPRESSION: No CT evidence of acute fracture or traumatic malalignment of the cervical spine. 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 SIGNED BY: Catalina Falk MD on 2/27/2023  2:56 PM    [address] [facility] [phone] -  2011 [facility]: [phone]        CT HAND RIGHT WO CONTRAST    Result Date: 2/28/2023  EXAMINATION: CT OF THE RIGHT HAND WITHOUT CONTRAST 2/28/2023 3:00 pm TECHNIQUE: CT of the right hand was performed without the administration of intravenous contrast.  Multiplanar reformatted images are provided for review. Automated exposure control, iterative reconstruction, and/or weight based adjustment of the mA/kV was utilized to reduce the radiation dose to as low as reasonably achievable. COMPARISON: None. HISTORY ORDERING SYSTEM PROVIDED HISTORY: Closed fracture of multiple phalanges of digit of hand, initial encounter TECHNOLOGIST PROVIDED HISTORY: [place] Reason for exam:->evaluate for joint dislocation Reason for exam:->multiple phalanx fractures Reason for Exam: evaluate for joint dislocation; multiple phalanx fractures FINDINGS: Bones:  The thumb is intact. Osteoarthritic changes which are advanced but no visualized fracture. The 2nd metacarpal is intact. Minimal sclerosis in the metacarpal head but no visualized fracture or dislocation. The proximal phalanx of the 2nd digit is fractured and angulated. The fracture does not appear to extend to the articular surface. Negative for dislocation. Osteoarthritic changes at the proximal and distal interphalangeal joints of the 2nd digit. The 3rd digit shows a fracture the proximal 3rd metacarpal.  This is a Y-shaped fracture. Extensive articular surface in the posterior osseous fragment is mildly posteriorly displaced. Negative for dislocation. Fracture at the dorsal aspect of the hamate which does extend to the articular surface but negative for dislocation. Fracture also at the posterior aspect of the capitate. It is tilted with respect to the lunate but no dislocation. Comminuted fracture of the proximal phalanx of the middle digit. The fracture is angulated but does not definitely extend to taking ower surface. Negative for dislocation. Osteoarthritic changes at the proximal and distal interphalangeal joints. The 4th metacarpal is intact. Fracture of the proximal aspect of the proximal phalanx. Is angulated. The fracture does involve the posterior aspect of the articular surface. The fracture is comminuted but negative for dislocation. It is mildly impacted. Osteoarthritic changes of the proximal and distal interphalangeal joints. The little finger shows the same fracture of the proximal aspect of the proximal phalanx. Fracture is comminuted and involves articular surface. Fracture fragments are mildly distracted. Negative for dislocation. Osteoarthritic change the proximal and distal interphalangeal joints. Negative for dislocation. The 5th metacarpal is not fractured. Multiple cysts within the lunate. Negative for dislocation.   Radial subluxation at the 1st metacarpal the SPECT to the trapezium. This is related to advanced osteoarthritis. Multiple ossicles in this region. Scapholunate space is minimally prominent but not grossly widened. Ulnar positive variance. Small subchondral cysts within the distal radius. Soft Tissue: Large amount of soft tissue swelling as expected. No visualized gas in the soft tissues. No radiopaque foreign objects. As can be seen, the tendons are intact. 1. Comminuted angulated fractures of the 2nd through 5th proximal phalanges. 2. Fracture of the 3rd proximal metacarpal. 3. Fractures at the distal dorsal aspect of the capitate and the hamate. Discharge Exam:  Constitutional: vitals as above: alert, appears stated age and cooperative    Psychiatric: normal insight and judgment, oriented to person, place, time, and general circumstances    Head: Normocephalic, without obvious abnormality, atraumatic    Eyes:lids and lashes normal, conjunctivae and sclerae normal and pupils equal, round, reactive to light and accomodation    EMNT: external ears normal, nares midline    Neck: no carotid bruit, supple, symmetrical, trachea midline and thyroid not enlarged, symmetric, no tenderness/mass/nodules     Respiratory: clear to auscultation and percussion bilaterally with normal respiratory effort    Cardiovascular: normal rate, regular rhythm, normal S1 and S2 and no murmurs    Gastrointestinal: soft, non-tender, non-distended, normal bowel sounds, no masses or organomegaly    Extremities: no clubbing, no edema    Skin:No rashes or nodules noted. Neurologic:negative      Disposition: SNF    Condition: stable    Discharge Medications:     Medication List        START taking these medications      enoxaparin Sodium 30 MG/0.3ML injection  Commonly known as: LOVENOX  Inject 0.3 mLs into the skin daily     HYDROcodone-acetaminophen 5-325 MG per tablet  Commonly known as: NORCO  Take 1 tablet by mouth every 6 hours as needed for Pain for up to 7 days.  Max Daily Amount: 4 tablets            CHANGE how you take these medications      calcium carbonate-vitamin D 600-400 MG-UNIT Tabs per tab  Commonly known as: Calcium 600 + D  Take 1 tablet by mouth daily  What changed: when to take this            CONTINUE taking these medications      albuterol sulfate  (90 Base) MCG/ACT inhaler  Commonly known as: PROVENTIL;VENTOLIN;PROAIR  Inhale 2 puffs into the lungs every 4 hours as needed for Wheezing     Alcohol Swabs Pads  1 each by Does not apply route daily     amLODIPine 10 MG tablet  Commonly known as: NORVASC  Take 1 tablet by mouth daily     aspirin 81 MG EC tablet     atorvastatin 10 MG tablet  Commonly known as: LIPITOR  Take 1 tablet by mouth daily     benzonatate 100 MG capsule  Commonly known as: TESSALON  Take 1 capsule by mouth 2 times daily as needed for Cough     Biotin 1000 MCG Tabs     blood glucose monitor kit and supplies  Test 2 times a day & as needed for symptoms of irregular blood glucose.      Breztri Aerosphere 160-9-4.8 MCG/ACT Aero  Generic drug: Budeson-Glycopyrrol-Formoterol  Inhale 1 inhalation into the lungs in the morning and at bedtime     doxepin 50 MG capsule  Commonly known as: SINEQUAN  Take 1 capsule by mouth nightly     fluticasone 50 MCG/ACT nasal spray  Commonly known as: FLONASE  1 spray by Each Nostril route daily     * Lancets Misc  1 each by Does not apply route 2 times daily     * Lancets Misc  1 each by Does not apply route 2 times daily     metoprolol 100 MG tablet  Commonly known as: LOPRESSOR  Take 1 tablet by mouth 2 times daily     montelukast 10 MG tablet  Commonly known as: SINGULAIR  Take 1 tablet by mouth daily     MULTIVITAMIN PO     polident 3 minute Tbef  1 tablet by Does not apply route daily     Refresh Optive 0.5-0.9 % Soln  Generic drug: Carboxymethylcellul-Glycerin     SITagliptin 100 MG tablet  Commonly known as: Januvia  Take 1 tablet by mouth daily     sodium chloride 0.65 % nasal spray  Commonly known as: OCEAN, BABY AYR  1 spray by Nasal route every 4 hours as needed for Congestion     valsartan-hydroCHLOROthiazide 320-12.5 MG per tablet  Commonly known as: DIOVAN-HCT  Take 1 tablet by mouth daily     vitamin B-12 1000 MCG tablet  Commonly known as: CYANOCOBALAMIN     Vitamin D3 50 MCG (2000 UT) Caps  Take 1 capsule by mouth daily           * This list has 2 medication(s) that are the same as other medications prescribed for you. Read the directions carefully, and ask your doctor or other care provider to review them with you. STOP taking these medications      blood glucose test strips     blood glucose test strips strip  Commonly known as: ASCENSIA AUTODISC VI;ONE TOUCH ULTRA TEST VI     ezetimibe 10 MG tablet  Commonly known as: Zetia     hydrocortisone 2.5 % cream     lipase-protease-amylase 46005-94698 units delayed release capsule  Commonly known as: CREON               Where to Get Your Medications        You can get these medications from any pharmacy    Bring a paper prescription for each of these medications  enoxaparin Sodium 30 MG/0.3ML injection  HYDROcodone-acetaminophen 5-325 MG per tablet            Allergies: Allergies   Allergen Reactions    Percocet [Oxycodone-Acetaminophen] Rash     Not sure but thinks it was this drug       Follow up Instructions: Follow-up with PCP: Avis Mckee MD in 2 wk .       Total time spent on day of discharge including face-to-face visit, examination, documentation, counseling, preparation of discharge plans and followup, and discharge medicine reconciliation and presciptions is 33 minutes    Signed:  Jeremy Calderon MD  3/12/2023

## 2023-03-12 NOTE — PROGRESS NOTES
Pt st she is coughing and can not rest.   Tessalon prn given.   Electronically signed by Parul Love RN on 3/12/2023 at 6:45 AM

## 2023-03-12 NOTE — CARE COORDINATION
Discharge Planning: Pt being discharged to [facility] via [employer]. HENS, JENNIE, AVS, facesheet faxed to facility. Named items plus Rx's for Lovenox and Norco assembled and placed in packet for Transport drivers.

## 2023-03-12 NOTE — PROGRESS NOTES
AM assessment complete, VSS, pt A&Ox4 in chair, denies any pain to right hand at this time, eating breakfast. AM med administered per [facility], POC and education reviewed with the pt. All needs met at this time, call light in reach, will continue to monitor. 1130: Pt discharged via [place] transport.

## 2023-03-12 NOTE — PLAN OF CARE
Problem: Chronic Conditions and Co-morbidities  Goal: Patient's chronic conditions and co-morbidity symptoms are monitored and maintained or improved  3/12/2023 1929 by Sammy Berrios RN  Outcome: Completed  3/12/2023 1114 by Catrachito Snyder RN  Outcome: Progressing     Problem: Discharge Planning  Goal: Discharge to home or other facility with appropriate resources  3/12/2023 1929 by Sammy Berrios RN  Outcome: Completed  3/12/2023 1114 by Catrachito Snyder RN  Outcome: Progressing     Problem: Safety - Adult  Goal: Free from fall injury  3/12/2023 1929 by Sammy Berrios RN  Outcome: Completed  3/12/2023 1114 by Catrachito Snyder RN  Outcome: Progressing     Problem: ABCDS Injury Assessment  Goal: Absence of physical injury  3/12/2023 1929 by Sammy Berrios RN  Outcome: Completed  3/12/2023 1114 by Catrachito Snyder RN  Outcome: Progressing     Problem: Pain  Goal: Verbalizes/displays adequate comfort level or baseline comfort level  3/12/2023 1929 by Sammy Berrios RN  Outcome: Completed  3/12/2023 1114 by Catrachito Snyder RN  Outcome: Progressing

## 2023-03-12 NOTE — PROGRESS NOTES
Shift assessment complete. Meds given per eMAR. R wrist wrapped for bed. Pt ambulating to bathroom without complication. Call light within reach. The care plan and education has been reviewed and mutually agreed upon with the patient.      Electronically signed by Hawa Garcia RN on 3/12/2023 at 1:50 AM

## 2023-03-12 NOTE — PLAN OF CARE
Problem: Chronic Conditions and Co-morbidities  Goal: Patient's chronic conditions and co-morbidity symptoms are monitored and maintained or improved  3/12/2023 1114 by Radha Pandya RN  Outcome: Progressing  3/11/2023 2235 by Edenilson Coon RN  Outcome: Progressing  Flowsheets (Taken 3/11/2023 0915 by Darin Miranda RN)  Care Plan - Patient's Chronic Conditions and Co-Morbidity Symptoms are Monitored and Maintained or Improved: Monitor and assess patient's chronic conditions and comorbid symptoms for stability, deterioration, or improvement     Problem: Discharge Planning  Goal: Discharge to home or other facility with appropriate resources  3/12/2023 1114 by Radha Pandya RN  Outcome: Progressing  3/11/2023 2235 by Edenilson Coon RN  Outcome: Progressing  Flowsheets (Taken 3/11/2023 0915 by Darin Miranda RN)  Discharge to home or other facility with appropriate resources: Identify barriers to discharge with patient and caregiver     Problem: Safety - Adult  Goal: Free from fall injury  3/12/2023 1114 by Radha Pandya RN  Outcome: Progressing  3/11/2023 2235 by Edenilson Coon RN  Outcome: Progressing  Flowsheets (Taken 3/11/2023 1158 by Darin Miranda RN)  Free From Fall Injury: Instruct family/caregiver on patient safety     Problem: ABCDS Injury Assessment  Goal: Absence of physical injury  3/12/2023 1114 by Radha Pandya RN  Outcome: Progressing  3/11/2023 2235 by Edenilson Coon RN  Outcome: Progressing  Flowsheets (Taken 3/11/2023 1158 by Darin Miranda RN)  Absence of Physical Injury: Implement safety measures based on patient assessment     Problem: Pain  Goal: Verbalizes/displays adequate comfort level or baseline comfort level  3/12/2023 1114 by Radha Pandya RN  Outcome: Progressing  3/11/2023 2235 by Edenilson Coon RN  Outcome: Progressing

## 2023-03-12 NOTE — PLAN OF CARE
Problem: Chronic Conditions and Co-morbidities  Goal: Patient's chronic conditions and co-morbidity symptoms are monitored and maintained or improved  Outcome: Progressing  Flowsheets (Taken 3/11/2023 0915 by Kenzie Uribe, RN)  Care Plan - Patient's Chronic Conditions and Co-Morbidity Symptoms are Monitored and Maintained or Improved: Monitor and assess patient's chronic conditions and comorbid symptoms for stability, deterioration, or improvement     Problem: Discharge Planning  Goal: Discharge to home or other facility with appropriate resources  Outcome: Progressing  Flowsheets (Taken 3/11/2023 0915 by Kenzie Uribe, RN)  Discharge to home or other facility with appropriate resources: Identify barriers to discharge with patient and caregiver     Problem: Safety - Adult  Goal: Free from fall injury  Outcome: Progressing  Flowsheets (Taken 3/11/2023 1158 by Kenzie Uribe RN)  Free From Fall Injury: Instruct family/caregiver on patient safety     Problem: ABCDS Injury Assessment  Goal: Absence of physical injury  Outcome: Progressing  Flowsheets (Taken 3/11/2023 1158 by Kenzie Uribe, RN)  Absence of Physical Injury: Implement safety measures based on patient assessment     Problem: Pain  Goal: Verbalizes/displays adequate comfort level or baseline comfort level  Outcome: Progressing

## 2023-03-12 NOTE — PROGRESS NOTES
Pt refused morning labs. Education on importance of daily labs draws with pt. Pt respectfully declined.   Electronically signed by Amalia Clement RN on 3/12/2023 at 5:44 AM

## 2023-03-13 ENCOUNTER — ANESTHESIA EVENT (OUTPATIENT)
Dept: OPERATING ROOM | Age: 88
End: 2023-03-13
Payer: MEDICARE

## 2023-03-13 ENCOUNTER — TELEPHONE (OUTPATIENT)
Dept: ORTHOPEDIC SURGERY | Age: 88
End: 2023-03-13

## 2023-03-13 RX ORDER — LOSARTAN POTASSIUM AND HYDROCHLOROTHIAZIDE 12.5; 1 MG/1; MG/1
1 TABLET ORAL DAILY
COMMUNITY

## 2023-03-13 NOTE — TELEPHONE ENCOUNTER
Left detailed message for Barbie on surgery time and address. Spoke with NP Tomas James. She states that patient's aspirin was not held today. Also, she ordered a chest xray because the patient had some crackling.   Tomas James will call back if the xrays show anything

## 2023-03-13 NOTE — PROGRESS NOTES
Caron Poster    Age 80 y.o.    female    1935    N [id number]    3/14/2023  Arrival Time_____________  OR Time____________40 Voncille Butt     Procedure(s):  CLOSED REDUCTION AND PERCUTANEOUS PINNING OF RIGHT INDEX FINGER, MIDDLE FINGER, RING FINGER AND SMALL FINGER PROXIMAL PHALANX FRACTURES                      General    Surgeon(s):  Vivien Nur, MD       Phone [phone] ([facility])     [address] [place] [address] [facility]  Cell         Work  _____________________________________________________________________  _____________________________________________________________________  _____________________________________________________________________  _____________________________________________________________________  _____________________________________________________________________    PCP _____________________________ Phone_________________     H&P__________________Bringing      Chart            Epic   DOS      Called________  EKG__________________Bringing      Chart            Epic   DOS      Called________  LAB__________________ Bringing      Chart            Epic   DOS      Called________  Cardiac Clearance_______Bringing      Chart            Epic      DOS      Called________    Cardiologist________________________ Phone___________________________    ? Sikhism concerns / Waiver on Chart            PAT Communications________________  ? Pre-op Instructions Given [place]          _________________________________  ? Directions to Surgery Center                          _________________________________  ? Transportation Home_______________      __________________________________  ?  Crutches/Walker__________________        __________________________________    ________Pre-op Orders   _______Transcribed    _______Wt.  ________Pharmacy          _______SCD  ______VTE     ______TED Reinier Jerry  _______  Surgery Consent    _______  Anesthesia Consent         COVID DATE______________LOCATION________________ RESULT__________

## 2023-03-13 NOTE — TELEPHONE ENCOUNTER
Medical Facility Question     Facility Name: [address] [place] [address] Main Name: [address] [place] [address] Number: [phone]  Request or Information: JAYESH FROM [facility] IS REQ A RETURN CALL REGARDING SX ARRIVAL TIME FOR PATIENT FOR TOMORROW. AS WELL AS ADDRESS. PLEASE RETURN CALL TO THE ABOVE NUMBER.

## 2023-03-14 ENCOUNTER — ANESTHESIA (OUTPATIENT)
Dept: OPERATING ROOM | Age: 88
End: 2023-03-14
Payer: MEDICARE

## 2023-03-14 ENCOUNTER — HOSPITAL ENCOUNTER (OUTPATIENT)
Age: 88
Setting detail: OUTPATIENT SURGERY
Discharge: OTHER INSTITUTION | End: 2023-03-14
Attending: ORTHOPAEDIC SURGERY | Admitting: ORTHOPAEDIC SURGERY
Payer: MEDICARE

## 2023-03-14 VITALS
BODY MASS INDEX: 22.99 KG/M2 | TEMPERATURE: 97.7 F | SYSTOLIC BLOOD PRESSURE: 142 MMHG | RESPIRATION RATE: 13 BRPM | WEIGHT: 110 LBS | HEART RATE: 64 BPM | DIASTOLIC BLOOD PRESSURE: 63 MMHG | OXYGEN SATURATION: 95 %

## 2023-03-14 LAB
GLUCOSE BLD-MCNC: 135 MG/DL (ref 70–99)
GLUCOSE BLD-MCNC: 147 MG/DL (ref 70–99)
PERFORMED ON: ABNORMAL
PERFORMED ON: ABNORMAL

## 2023-03-14 PROCEDURE — 6360000002 HC RX W HCPCS: Performed by: NURSE ANESTHETIST, CERTIFIED REGISTERED

## 2023-03-14 PROCEDURE — C1713 ANCHOR/SCREW BN/BN,TIS/BN: HCPCS | Performed by: ORTHOPAEDIC SURGERY

## 2023-03-14 PROCEDURE — 2709999900 HC NON-CHARGEABLE SUPPLY: Performed by: ORTHOPAEDIC SURGERY

## 2023-03-14 PROCEDURE — A4217 STERILE WATER/SALINE, 500 ML: HCPCS | Performed by: ORTHOPAEDIC SURGERY

## 2023-03-14 PROCEDURE — 7100000010 HC PHASE II RECOVERY - FIRST 15 MIN: Performed by: ORTHOPAEDIC SURGERY

## 2023-03-14 PROCEDURE — 3700000000 HC ANESTHESIA ATTENDED CARE: Performed by: ORTHOPAEDIC SURGERY

## 2023-03-14 PROCEDURE — 2500000003 HC RX 250 WO HCPCS: Performed by: ORTHOPAEDIC SURGERY

## 2023-03-14 PROCEDURE — 3700000001 HC ADD 15 MINUTES (ANESTHESIA): Performed by: ORTHOPAEDIC SURGERY

## 2023-03-14 PROCEDURE — 2580000003 HC RX 258: Performed by: ANESTHESIOLOGY

## 2023-03-14 PROCEDURE — 2500000003 HC RX 250 WO HCPCS: Performed by: NURSE ANESTHETIST, CERTIFIED REGISTERED

## 2023-03-14 PROCEDURE — 7100000011 HC PHASE II RECOVERY - ADDTL 15 MIN: Performed by: ORTHOPAEDIC SURGERY

## 2023-03-14 PROCEDURE — 6360000002 HC RX W HCPCS: Performed by: ANESTHESIOLOGY

## 2023-03-14 PROCEDURE — 7100000001 HC PACU RECOVERY - ADDTL 15 MIN: Performed by: ORTHOPAEDIC SURGERY

## 2023-03-14 PROCEDURE — 2580000003 HC RX 258: Performed by: ORTHOPAEDIC SURGERY

## 2023-03-14 PROCEDURE — 6360000002 HC RX W HCPCS: Performed by: ORTHOPAEDIC SURGERY

## 2023-03-14 PROCEDURE — 7100000000 HC PACU RECOVERY - FIRST 15 MIN: Performed by: ORTHOPAEDIC SURGERY

## 2023-03-14 PROCEDURE — 3600000014 HC SURGERY LEVEL 4 ADDTL 15MIN: Performed by: ORTHOPAEDIC SURGERY

## 2023-03-14 PROCEDURE — 3600000004 HC SURGERY LEVEL 4 BASE: Performed by: ORTHOPAEDIC SURGERY

## 2023-03-14 DEVICE — K WIRE FIX L6IN DIA0.045IN 1600645] MICROAIRE SURGICAL INSTRUMENTS INC]: Type: IMPLANTABLE DEVICE | Site: WRIST | Status: FUNCTIONAL

## 2023-03-14 RX ORDER — DIPHENHYDRAMINE HYDROCHLORIDE 50 MG/ML
12.5 INJECTION INTRAMUSCULAR; INTRAVENOUS
Status: DISCONTINUED | OUTPATIENT
Start: 2023-03-14 | End: 2023-03-14 | Stop reason: HOSPADM

## 2023-03-14 RX ORDER — HYDROCODONE BITARTRATE AND ACETAMINOPHEN 5; 325 MG/1; MG/1
2 TABLET ORAL EVERY 4 HOURS PRN
Status: DISCONTINUED | OUTPATIENT
Start: 2023-03-14 | End: 2023-03-14 | Stop reason: HOSPADM

## 2023-03-14 RX ORDER — SODIUM CHLORIDE 0.9 % (FLUSH) 0.9 %
5-40 SYRINGE (ML) INJECTION PRN
Status: DISCONTINUED | OUTPATIENT
Start: 2023-03-14 | End: 2023-03-14 | Stop reason: HOSPADM

## 2023-03-14 RX ORDER — SODIUM CHLORIDE, SODIUM LACTATE, POTASSIUM CHLORIDE, CALCIUM CHLORIDE 600; 310; 30; 20 MG/100ML; MG/100ML; MG/100ML; MG/100ML
INJECTION, SOLUTION INTRAVENOUS CONTINUOUS
Status: DISCONTINUED | OUTPATIENT
Start: 2023-03-14 | End: 2023-03-14 | Stop reason: HOSPADM

## 2023-03-14 RX ORDER — MAGNESIUM HYDROXIDE 1200 MG/15ML
LIQUID ORAL CONTINUOUS PRN
Status: COMPLETED | OUTPATIENT
Start: 2023-03-14 | End: 2023-03-14

## 2023-03-14 RX ORDER — LIDOCAINE HYDROCHLORIDE 20 MG/ML
INJECTION, SOLUTION INFILTRATION; PERINEURAL PRN
Status: DISCONTINUED | OUTPATIENT
Start: 2023-03-14 | End: 2023-03-14 | Stop reason: SDUPTHER

## 2023-03-14 RX ORDER — SODIUM CHLORIDE 0.9 % (FLUSH) 0.9 %
5-40 SYRINGE (ML) INJECTION EVERY 12 HOURS SCHEDULED
Status: DISCONTINUED | OUTPATIENT
Start: 2023-03-14 | End: 2023-03-14 | Stop reason: HOSPADM

## 2023-03-14 RX ORDER — LIDOCAINE HYDROCHLORIDE 10 MG/ML
1 INJECTION, SOLUTION EPIDURAL; INFILTRATION; INTRACAUDAL; PERINEURAL
Status: DISCONTINUED | OUTPATIENT
Start: 2023-03-14 | End: 2023-03-14 | Stop reason: HOSPADM

## 2023-03-14 RX ORDER — BUPIVACAINE HYDROCHLORIDE 5 MG/ML
INJECTION, SOLUTION EPIDURAL; INTRACAUDAL PRN
Status: DISCONTINUED | OUTPATIENT
Start: 2023-03-14 | End: 2023-03-14 | Stop reason: ALTCHOICE

## 2023-03-14 RX ORDER — ONDANSETRON 2 MG/ML
4 INJECTION INTRAMUSCULAR; INTRAVENOUS
Status: DISCONTINUED | OUTPATIENT
Start: 2023-03-14 | End: 2023-03-14 | Stop reason: HOSPADM

## 2023-03-14 RX ORDER — PROPOFOL 10 MG/ML
INJECTION, EMULSION INTRAVENOUS PRN
Status: DISCONTINUED | OUTPATIENT
Start: 2023-03-14 | End: 2023-03-14 | Stop reason: SDUPTHER

## 2023-03-14 RX ORDER — FENTANYL CITRATE 50 UG/ML
INJECTION, SOLUTION INTRAMUSCULAR; INTRAVENOUS PRN
Status: DISCONTINUED | OUTPATIENT
Start: 2023-03-14 | End: 2023-03-14 | Stop reason: SDUPTHER

## 2023-03-14 RX ORDER — SODIUM CHLORIDE 9 MG/ML
INJECTION, SOLUTION INTRAVENOUS PRN
Status: DISCONTINUED | OUTPATIENT
Start: 2023-03-14 | End: 2023-03-14 | Stop reason: HOSPADM

## 2023-03-14 RX ORDER — CEFAZOLIN SODIUM IN 0.9 % NACL 2 G/100 ML
2000 PLASTIC BAG, INJECTION (ML) INTRAVENOUS
Status: COMPLETED | OUTPATIENT
Start: 2023-03-14 | End: 2023-03-14

## 2023-03-14 RX ORDER — LABETALOL HYDROCHLORIDE 5 MG/ML
5 INJECTION, SOLUTION INTRAVENOUS EVERY 10 MIN PRN
Status: DISCONTINUED | OUTPATIENT
Start: 2023-03-14 | End: 2023-03-14 | Stop reason: HOSPADM

## 2023-03-14 RX ORDER — HYDROCODONE BITARTRATE AND ACETAMINOPHEN 5; 325 MG/1; MG/1
1 TABLET ORAL EVERY 4 HOURS PRN
Status: DISCONTINUED | OUTPATIENT
Start: 2023-03-14 | End: 2023-03-14 | Stop reason: HOSPADM

## 2023-03-14 RX ORDER — ONDANSETRON 2 MG/ML
INJECTION INTRAMUSCULAR; INTRAVENOUS PRN
Status: DISCONTINUED | OUTPATIENT
Start: 2023-03-14 | End: 2023-03-14 | Stop reason: SDUPTHER

## 2023-03-14 RX ORDER — DEXAMETHASONE SODIUM PHOSPHATE 10 MG/ML
INJECTION INTRAMUSCULAR; INTRAVENOUS PRN
Status: DISCONTINUED | OUTPATIENT
Start: 2023-03-14 | End: 2023-03-14 | Stop reason: SDUPTHER

## 2023-03-14 RX ADMIN — Medication 2000 MG: at 08:57

## 2023-03-14 RX ADMIN — PROPOFOL 120 MG: 10 INJECTION, EMULSION INTRAVENOUS at 08:53

## 2023-03-14 RX ADMIN — LIDOCAINE HYDROCHLORIDE 60 MG: 20 INJECTION, SOLUTION INFILTRATION; PERINEURAL at 08:53

## 2023-03-14 RX ADMIN — SODIUM CHLORIDE, POTASSIUM CHLORIDE, SODIUM LACTATE AND CALCIUM CHLORIDE: 600; 310; 30; 20 INJECTION, SOLUTION INTRAVENOUS at 07:44

## 2023-03-14 RX ADMIN — HYDROMORPHONE HYDROCHLORIDE 0.5 MG: 0.5 INJECTION, SOLUTION INTRAMUSCULAR; INTRAVENOUS; SUBCUTANEOUS at 09:40

## 2023-03-14 RX ADMIN — HYDROMORPHONE HYDROCHLORIDE 0.5 MG: 0.5 INJECTION, SOLUTION INTRAMUSCULAR; INTRAVENOUS; SUBCUTANEOUS at 09:54

## 2023-03-14 RX ADMIN — DEXAMETHASONE SODIUM PHOSPHATE 10 MG: 10 INJECTION INTRAMUSCULAR; INTRAVENOUS at 08:53

## 2023-03-14 RX ADMIN — ONDANSETRON 4 MG: 2 INJECTION INTRAMUSCULAR; INTRAVENOUS at 08:53

## 2023-03-14 RX ADMIN — FENTANYL CITRATE 25 MCG: 50 INJECTION INTRAMUSCULAR; INTRAVENOUS at 08:53

## 2023-03-14 ASSESSMENT — PAIN DESCRIPTION - LOCATION: LOCATION: HAND

## 2023-03-14 ASSESSMENT — PAIN DESCRIPTION - DESCRIPTORS: DESCRIPTORS: ACHING

## 2023-03-14 ASSESSMENT — PAIN SCALES - GENERAL
PAINLEVEL_OUTOF10: 3
PAINLEVEL_OUTOF10: 3
PAINLEVEL_OUTOF10: 0
PAINLEVEL_OUTOF10: 10
PAINLEVEL_OUTOF10: 8

## 2023-03-14 ASSESSMENT — PAIN DESCRIPTION - ORIENTATION: ORIENTATION: RIGHT

## 2023-03-14 NOTE — PROGRESS NOTES
Attempted call to rehab x2 to give report but  unable to find a nurse to take report. Gave phone number to  for nurse to call back when able.

## 2023-03-14 NOTE — ANESTHESIA POSTPROCEDURE EVALUATION
Department of Anesthesiology  Postprocedure Note    Patient: Ingrid Meléndez  MRN: [id number]  YOB: 1935  Date of evaluation: 3/14/2023      Procedure Summary     Date: 03/14/23 Room / Location: [address] Akilah Watters 01 / Ofe Familia    Anesthesia Start: 1093 Anesthesia Stop: 1177    Procedure: CLOSED REDUCTION AND PERCUTANEOUS PINNING OF RIGHT INDEX FINGER, MIDDLE FINGER, RING FINGER AND SMALL FINGER PROXIMAL PHALANX FRACTURES (Right: Wrist) Diagnosis:       Closed fracture of proximal phalanx of digit of right hand, initial encounter      (RIGHT INDEX FINGER, MIDDLE FINGER, RING FINGER AND SMALL FINGER PROXIMAL PHALANX FRACTURE)    Surgeons: Kandis Monk MD Responsible Provider: Butch Ferguson MD    Anesthesia Type: general ASA Status: 3          Anesthesia Type: No value filed.     Lex Phase I: Lex Score: 9    Lex Phase II: Lex Score: 10      Anesthesia Post Evaluation    Comments: Postoperative Anesthesia Note    Name:    Ingrid Meléndez  MRN:      [id number]    Patient Vitals in the past 12 hrs:  03/14/23 1035, BP:(!) 142/63, Pulse:64, Resp:13, SpO2:95 %  03/14/23 1030, BP:(!) 143/65, Pulse:62, Resp:14, SpO2:93 %  03/14/23 1015, BP:(!) 170/59, Temp:97.7 °F (36.5 °C), Temp src:Temporal, Pulse:60, Resp:15, SpO2:98 %  03/14/23 1000, BP:(!) 157/59, Pulse:64, Resp:14, SpO2:98 %  03/14/23 0945, BP:(!) 167/61, Pulse:67, Resp:17, SpO2:96 %  03/14/23 0940, BP:(!) 174/93, Pulse:67, Resp:12, SpO2:92 %  03/14/23 0925, BP:(!) 166/71, Temp:97.7 °F (36.5 °C), Temp src:Temporal, Pulse:68, Resp:16, SpO2:94 %  03/14/23 0745, BP:(!) 182/71, Temp:97.6 °F (36.4 °C), Temp src:Temporal, Pulse:58, Resp:16, SpO2:95 %     LABS:    CBC  Lab Results       Component                Value               Date/Time                  WBC                      8.8                 03/11/2023 05:18 AM        HGB                      12.9                03/11/2023 05:18 AM        HCT                      39.8 03/11/2023 05:18 AM        PLT                      259                 03/11/2023 05:18 AM   RENAL  Lab Results       Component                Value               Date/Time                  NA                       134 (L)             03/11/2023 05:18 AM        K                        3.9                 03/11/2023 05:18 AM        K                        3.8                 03/07/2023 03:16 PM        CL                       101                 03/11/2023 05:18 AM        CO2                      23                  03/11/2023 05:18 AM        BUN                      17                  03/11/2023 05:18 AM        CREATININE               <0.5 (L)            03/11/2023 05:18 AM        GLUCOSE                  139 (H)             03/11/2023 05:18 AM   COAGS  Lab Results       Component                Value               Date/Time                  PROTIME                  11.9                04/30/2021 10:40 PM        INR                      1.03                04/30/2021 10:40 PM        APTT                     33.1                05/02/2021 06:03 AM     Intake & Output:  @43XRVN@    Nausea & Vomiting:  No    Level of Consciousness:  Awake    Pain Assessment:  Adequate analgesia    Anesthesia Complications:  No apparent anesthetic complications    SUMMARY      Vital signs stable  OK to discharge from Stage I post anesthesia care.   Care transferred from Anesthesiology department on discharge from perioperative area

## 2023-03-14 NOTE — PROGRESS NOTES
Dtg at bedside sitting with patient. Clarification on admission vs discharge back to rehab to center. Dtg states patient is going back to rehab and verified with Dr. Ky Rico.

## 2023-03-14 NOTE — H&P
Pre-operative Update of H&P:    I  have seen & examined Ms. Juan Pablo Alvarado related solely to her hand and upper extremity conditions, prior to the scheduled procedure on the date of her surgery. The indications for the planned surgical procedure & and her upper-extremity condition are unchanged.

## 2023-03-14 NOTE — OP NOTE
OPERATIVE REPORT          Patient:  Bernardino Hughes    YOB: 1935  Date of Service:  3/14/2023    Location:  [facility]    Preoperative Diagnosis:  Right Index Finger, Middle Finger, Ring Finger, and Small Finger Proximal Phalanx Base unstable fracture      Postoperative Diagnosis:  Same      Procedure:  Closed reduction and percutaneous pinning of Right Index Finger, Middle Finger, Ring Finger, and Small Finger Proximal Phalanx Base fracture    Surgeon:    Jany Griffith. Alison Cohen MD    Surgical Assistant:    REN Mcfadden Assistant    Anesthesia:  General         Blood Loss: Minimal    Complications: None                                                           Indications:  Ms. Bernardino Hughes  is a 80y.o. year old female with a Right Index Finger, Middle Finger, Ring Finger, and Small Finger Proximal Phalanx Base fracture which is unstable. I have discussed preoperatively with her  the complications, limitations, expectations, alternatives, and risks of surgical care. Ms. Bernardino Hughes has provided written informed consent to proceed. Procedure: After written consent was obtained and the proper site was identified and marked, Ms. Bernardino Hughes  was brought to the operating room and placed in the supine position on the operating room table. The Right arm was extended upon a hand table. A dose of preoperative antibiotics was administered and the Right upper extremity was prepped and draped in the usual sterile fashion. Under fluoroscopic guidance, a closed reduction of the Index Finger Proximal Phalanx fracture was performed. two 0.045\" K-wires were percutaneously placed securing the fracture fragments in anatomic alignment, assuring that there was no distraction or malrotation. Under fluoroscopic guidance, a closed reduction of the Middle Finger Proximal Phalanx fracture was performed.   two 0.045\" K-wires were percutaneously placed securing the fracture fragments in anatomic alignment, assuring that there was no distraction or malrotation. Under fluoroscopic guidance, a closed reduction of the Ring Finger Proximal Phalanx fracture was performed. two 0.045\" K-wires were percutaneously placed securing the fracture fragments in anatomic alignment, assuring that there was no distraction or malrotation. Under fluoroscopic guidance, a closed reduction of the Small Finger Proximal Phalanx fracture was performed. two 0.045\" K-wires were percutaneously placed securing the fracture fragments in anatomic alignment, assuring that there was no distraction or malrotation. Final fluoroscopic images demonstrated anatomic alignment and appropriate reduction of the fracture fragments. The K-wires were checked for appropriate position and dimension. The wire ends were bent, cut short, & protective caps were applied. Local anesthestic was instilled for post-operative analgesia. The pin sites were dressed with Adaptic, dry sterile dressings, and a very well padded short arm fiberglass cast was applied incorporating the Index Finger, Middle Finger, Ring Finger, and Small Finger in an intrinsic safe position was applied. Ms. Bernardino Hughes  was awakened from anesthesia without apparent complication and was returned to the recovery room in stable condition. At the conclusion of the procedure, all needles, instruments, and sponge counts were correct. Sharon Cohen MD   3/14/2023 , 8:47 AM

## 2023-03-14 NOTE — ANESTHESIA PRE PROCEDURE
Department of Anesthesiology  Preprocedure Note       Name:  Antonio Sarah   Age:  80 y.o.  :  1935                                          MRN:  [id number]         Date:  3/14/2023      Surgeon: Aicha Palm):  Angely Sharma MD    Procedure: Procedure(s):  CLOSED REDUCTION AND PERCUTANEOUS PINNING OF RIGHT INDEX FINGER, MIDDLE FINGER, RING FINGER AND SMALL FINGER PROXIMAL PHALANX FRACTURES    Medications prior to admission:   Prior to Admission medications    Medication Sig Start Date End Date Taking? Authorizing Provider   Fluticasone Propionate (FLONASE NA) by Nasal route daily   Yes Historical Provider, MD   losartan-hydroCHLOROthiazide (HYZAAR) 100-12.5 MG per tablet Take 1 tablet by mouth daily   Yes Historical Provider, MD   linagliptin (TRADJENTA) 5 MG tablet Take 5 mg by mouth daily   Yes Historical Provider, MD   enoxaparin Sodium (LOVENOX) 30 MG/0.3ML injection Inject 0.3 mLs into the skin daily 3/8/23   Aly Miller MD   HYDROcodone-acetaminophen (NORCO) 5-325 MG per tablet Take 1 tablet by mouth every 6 hours as needed for Pain for up to 7 days.  Max Daily Amount: 4 tablets 3/8/23 3/15/23  Aly Miller MD   Budeson-Glycopyrrol-Formoterol (BREZTRI AEROSPHERE) 160-9-4.8 MCG/ACT AERO Inhale 1 inhalation into the lungs in the morning and at bedtime 3/3/23 6/1/23  Aly Miller MD   aspirin 81 MG EC tablet Take 81 mg by mouth daily    Historical Provider, MD   albuterol sulfate HFA (PROVENTIL;VENTOLIN;PROAIR) 108 (90 Base) MCG/ACT inhaler Inhale 2 puffs into the lungs every 4 hours as needed for Wheezing 2/14/23 3/16/23  Aly Miller MD   metoprolol (LOPRESSOR) 100 MG tablet Take 1 tablet by mouth 2 times daily 2/14/23 5/15/23  Aly Miller MD   amLODIPine (NORVASC) 10 MG tablet Take 1 tablet by mouth daily 2/14/23 5/15/23  Ayl Miller MD   montelukast (SINGULAIR) 10 MG tablet Take 1 tablet by mouth daily 2/14/23 5/15/23  Aly Miller MD   doxepin (SINEQUAN) 50 MG capsule Take 1 capsule by mouth nightly 2/14/23   Tavares Tejada MD   atorvastatin (LIPITOR) 10 MG tablet Take 1 tablet by mouth daily 2/14/23 5/15/23  Tavares Tejada MD   benzonatate (TESSALON) 100 MG capsule Take 1 capsule by mouth 2 times daily as needed for Cough 2/14/23 5/15/23  Tavares Tejada MD   Cholecalciferol (VITAMIN D3) 50 MCG (2000 UT) CAPS Take 1 capsule by mouth daily 11/20/22   MD Malia Meraz MISC 1 each by Does not apply route 2 times daily 3/16/22   Tavares Tejada MD   sodium chloride (OCEAN, BABY AYR) 0.65 % nasal spray 1 spray by Nasal route every 4 hours as needed for Congestion 5/5/21   Tavares Tejada MD   Carboxymethylcellul-Glycerin (REFRESH OPTIVE) 0.5-0.9 % SOLN Apply 1 drop to eye nightly Both eyes nightly 4/30/21   Historical Provider, MD   Biotin 1000 MCG TABS Take 1 tablet by mouth daily    Historical Provider, MD   vitamin B-12 (CYANOCOBALAMIN) 1000 MCG tablet Take 1,000 mcg by mouth daily    Historical Provider, MD   blood glucose monitor kit and supplies Test 2 times a day & as needed for symptoms of irregular blood glucose. 10/28/19   MD Malia Meraz MISC 1 each by Does not apply route 2 times daily 10/1/19   Tavares Tejada MD   Alcohol Swabs PADS 1 each by Does not apply route daily 9/28/19   Tavares Tejada MD   calcium carbonate-vitamin D (CALCIUM 600 + D) 600-400 MG-UNIT TABS per tab Take 1 tablet by mouth daily 11/10/17   Tavares Tejada MD   Denture Care Products (POLIDENT 3 MINUTE) TBEF 1 tablet by Does not apply route daily 8/7/17   Tavares Tejada MD   Multiple Vitamin (MULTIVITAMIN PO) Take 1 tablet by mouth daily.     Historical Provider, MD       Current medications:    Current Facility-Administered Medications   Medication Dose Route Frequency Provider Last Rate Last Admin    ceFAZolin (ANCEF) 2000 mg in 0.9% sodium chloride 100 mL IVPB  2,000 mg IntraVENous On Call to Hang Bowman MD        lidocaine PF 1 % injection 1 mL  1 mL IntraDERmal Once PRN Kenji Pitt MD        lactated ringers IV soln infusion   IntraVENous Continuous Kenji Pitt  mL/hr at 03/14/23 0744 New Bag at 03/14/23 0744    sodium chloride flush 0.9 % injection 5-40 mL  5-40 mL IntraVENous 2 times per day Kenji Pitt MD        sodium chloride flush 0.9 % injection 5-40 mL  5-40 mL IntraVENous PRN Kenji Pitt MD        0.9 % sodium chloride infusion   IntraVENous PRN Kenji Pitt MD           Allergies: Allergies   Allergen Reactions    Percocet [Oxycodone-Acetaminophen] Rash     Not sure but thinks it was this drug       Problem List:    Patient Active Problem List   Diagnosis Code    Osteoarthritis of left knee M17.12    Controlled type 2 diabetes mellitus without complication, without long-term current use of insulin (formerly [facility]) E11.9    Hypertension I10    ASCVD (arteriosclerotic cardiovascular disease) I25.10    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 2XXA    Right rotator cuff tear arthropathy M75.101, M12.811    Fall from slip, trip, or stumble, initial encounter W01. 0XXA    Irritable bowel syndrome with diarrhea K58.0    Nocturnal cough with wheeze R05.8, R06.2    Exocrine pancreatic insufficiency K86.81    Closed fracture of multiple sites of phalanges of hand S62.609A    Closed multiple fractures of right hand bones with routine healing S62. 91XD    Reactive airway disease without complication K41.565    Unable to care for self Z78.9    Pure hypercholesterolemia E78.00    Frequent falls R29.6    General weakness R53.1       Past Medical History:        Diagnosis Date    Arthritis     Diabetes mellitus ([facility] [address]     HYPERCHOLESTERAEMIA     Hypertension     TIA (transient ischaemic attack) 2003       Past Surgical History:        Procedure Laterality Date    COLECTOMY      JOINT REPLACEMENT  04/22/2010    left knee    PATELLA FRACTURE SURGERY Left 5/2/2021    PATELLA OPEN REDUCTION INTERNAL FIXATION performed by Dany Kiran MD at [address] [place] [address]  8/19/2010    right, cemented       Social History:    Social History     Tobacco Use    Smoking status: Never    Smokeless tobacco: Never   Substance Use Topics    Alcohol use: No                                Counseling given: Not Answered      Vital Signs (Current):   Vitals:    03/13/23 0859 03/14/23 0745   BP:  (!) 182/71   Pulse:  58   Resp:  16   Temp:  97.6 °F (36.4 °C)   TempSrc:  Temporal   SpO2:  95%   Weight: 110 lb (49.9 kg)                                               BP Readings from Last 3 Encounters:   03/14/23 (!) 182/71   03/12/23 125/73   03/07/23 (!) 175/70       NPO Status: Time of last liquid consumption: 1800                        Time of last solid consumption: 1800                        Date of last liquid consumption: 03/13/23                        Date of last solid food consumption: 03/13/23    BMI:   Wt Readings from Last 3 Encounters:   03/13/23 110 lb (49.9 kg)   03/07/23 112 lb (50.8 kg)   03/02/23 110 lb (49.9 kg)     Body mass index is 22.99 kg/m².     CBC:   Lab Results   Component Value Date/Time    WBC 8.8 03/11/2023 05:18 AM    RBC 4.39 03/11/2023 05:18 AM    HGB 12.9 03/11/2023 05:18 AM    HCT 39.8 03/11/2023 05:18 AM    MCV 90.5 03/11/2023 05:18 AM    RDW 14.0 03/11/2023 05:18 AM     03/11/2023 05:18 AM       CMP:   Lab Results   Component Value Date/Time     03/11/2023 05:18 AM    K 3.9 03/11/2023 05:18 AM    K 3.8 03/07/2023 03:16 PM     03/11/2023 05:18 AM    CO2 23 03/11/2023 05:18 AM    BUN 17 03/11/2023 05:18 AM    CREATININE <0.5 03/11/2023 05:18 AM    GFRAA >60 09/13/2022 01:28 PM    GFRAA >60 08/20/2010 04:26 AM    AGRATIO 1.5 03/07/2023 03:16 PM    LABGLOM >60 03/11/2023 05:18 AM    GLUCOSE 139 03/11/2023 05:18 AM    PROT 6.8 03/07/2023 03:16 PM    PROT 7.1 05/17/2010 10:43 AM    CALCIUM 9.0 03/11/2023 05:18 AM    BILITOT 0.3 03/07/2023 03:16 PM    ALKPHOS 69 03/07/2023 03:16 PM    AST 19 03/07/2023 03:16 PM    ALT 20 03/07/2023 03:16 PM       POC Tests:   Recent Labs     03/14/23  0736   POCGLU 147*       Coags:   Lab Results   Component Value Date/Time    PROTIME 11.9 04/30/2021 10:40 PM    INR 1.03 04/30/2021 10:40 PM    APTT 33.1 05/02/2021 06:03 AM       HCG (If Applicable): No results found for: PREGTESTUR, PREGSERUM, HCG, HCGQUANT     ABGs: No results found for: PHART, PO2ART, VAV8DOT, LPW9FFE, BEART, J0HNCPAE     Type & Screen (If Applicable):  Lab Results   Component Value Date    LABABO A 08/19/2010    [address] Positive 08/19/2010       Drug/Infectious Status (If Applicable):  No results found for: HIV, HEPCAB    COVID-19 Screening (If Applicable):   Lab Results   Component Value Date/Time    COVID19 Not Detected 05/06/2021 12:45 PM           Anesthesia Evaluation  Patient summary reviewed no history of anesthetic complications:   Airway: Mallampati: II  TM distance: >3 FB   Neck ROM: full  Mouth opening: > = 3 FB   Dental:    (+) upper dentures and lower dentures      Pulmonary:normal exam  breath sounds clear to auscultation      (-) COPD, asthma and sleep apnea                          ROS comment: Reactive airway dz, uses inhaler   Cardiovascular:  Exercise tolerance: good (>4 METS),   (+) hypertension:,     (-) CAD,  angina and  GRAMAJO      Rhythm: regular  Rate: normal                    Neuro/Psych:   (+) neuromuscular disease:, TIA,    (-) seizures           GI/Hepatic/Renal:   (+) GERD: well controlled,      (-) liver disease and no renal disease       Endo/Other:    (+) Diabetes, . Abdominal:             Vascular: negative vascular ROS.          Other Findings:           Anesthesia Plan      general     ASA 3     (I discussed with the patient the risks and benefits of PIV, anesthesia, IV Narcotics, PACU. All questions were answered the patient agrees with the plan and wishes to proceed)  Induction: intravenous.                             Henrietta Salvador MD   3/14/2023

## 2023-03-14 NOTE — DISCHARGE INSTRUCTIONS
Post-Operative Instructions    Hand & Finger Fracture Repair:    Keep hand strictly elevated with fingers above eye-level to control swelling and pain. NOTE: If hand is allowed to swell, pain will be very difficult to control. Keep hand and bandage clean and dry. Do not change or unwrap bandage. Please leave bandage in place until your follow-up appointment. Maintain finger motion by fully straightening and fully bending the exposed fingers at least once an hour (while awake). This may cause some discomfort, but will not damage surgery. You should not use your operated hand for any tasks. NO LIFTING, CARRYING OR HEAVY USE. You may take the prescribed pain medication as needed  OR   You may take over the counter medication (Tylenol, Advil, Aleve, etc.) but you should not take both together unless otherwise instructed. Please call the office at [phone]  in 24 - 48 hours to schedule a follow up appointment for 1 - 2 weeks after surgery. Please call the office at [phone]  if you have any questions or problems. Lexy Macias MD    ANESTHESIA DISCHARGE INSTRUCTIONS    You are under the influence of drugs- do not drink alcohol, drive a car, operate machinery(such as power tools, kitchen appliances, etc), sign legal documents, or make any important decisions for 24 hours (or while on pain medications). Children should not ride bikes or [place] or play on gym sets  for 24 hours after surgery. A responsible adult should be with you for 24 hours. Rest at home today- increase activity as tolerated. Progress slowly to a regular diet unless your physician has instructed you otherwise. Drink plenty of water. CALL YOUR DOCTOR IF YOU:  Have moderate to severe nausea or vomiting AND are unable to hold down fluids or prescribed medications. Have bright red bloody drainage from your dressing that won't stop oozing.   Do not get relief with your pain medication    NORMAL (POSSIBLE) SIDE EFFECTS FROM ANESTHESIA:     Confusion, temporary memory loss, delayed reaction times in the first 24 hours  Lightheadedness, dizziness, difficulty focusing, blurred vision  Nausea/vomiting can happen  Shivering, feeling cold, sore throat, cough and muscle aches should stop within 24-48 hours  Trouble urinating - call your surgeon if it has been more than 8 hrs  Bruising or soreness at the IV site - call if it remains red, firm or there is drainage             FEMALES OF CHILDBEARING AGE WHO ARE TAKING BIRTH CONTROL PILLS:  You may have received a medication during your procedure that interferes with the   actions of birth control pills (Bridion or Emend). Use some other kind of birth control in addition to your pills, like a condom, for 1 month after your procedure to prevent unwanted pregnancy. The following instructions are to be followed if you have a known history or diagnosis of sleep apnea: For all sleep apnea patients:  ? Sleep on your side or sitting up in a chair whenever possible, especially the first 24 hours after surgery. ? Use only medicines prescribed by your doctor. ? Do not drink alcohol. ? If you have a dental device to assist you while at rest, use it at all times for the first 24 hours. For patients using CPAP machines:  ? Use your CPAP machine during all periods of sleep as usual.  ? Use your CPAP machine during all periods of daytime rest while on pain medicines. ** Follow up with your primary care doctor for continued care. IF YOU DO NOT TAKE ALL OF YOUR NARCOTIC PAIN MEDICATION, please dispose of them responsibly. There are drop off boxes in the Emergency Departments 24/7 at both [facility] and [place]. If these locations are not convenient, other options for discarding them can be found at:  [email or web address]    Hospital or office staff may NOT accept any medications to drop off in the cabinet for you. What is a Surgical Site Infection or  (SSI)?         A surgical site infection (SSI) is an infection that occurs after surgery in the part of the body where the surgery took place. Most patients who have surgery do not develop an infection. However, infections can develop in about 1-3 cases for every 100 patients who have had surgery. Our goal is for you to NOT experience any complications and be completely satisfied with your care! However, some signs or symptoms to look for and report immediately to your doctor are:   1. Fever above 101 degrees    2. Redness and increasing pain around the area  where you had surgery   3. Drainage of cloudy fluid or pus coming from the surgical area    Some of the things we/ you can do to prevent SSI's are:   1. Clean hands with soap and water or an alcohol-based hand rub before and after caring for the operative area. This occurs the day of surgery and for the next 2 weeks. 2.Sometimes you receive an appropriate antibiotic within 60 minutes before your surgery or take one for several days after surgery depending on your surgeon's instructions and/or the type of surgery you are having. 3. Family and/or friends who visit you should NOT touch the surgical wound or dressings until advised by your surgeon. 4. Be sure to elevate and decrease the swelling after your surgery to help prevent infection. 5. If you are a diabetic, you need to closely monitor your blood sugar levels and report any significant increases or changes to your surgeon to help promote the healing process.

## 2023-03-18 RX ORDER — CYCLOBENZAPRINE HCL 5 MG
5 TABLET ORAL NIGHTLY PRN
Qty: 10 TABLET | Refills: 0 | Status: SHIPPED | OUTPATIENT
Start: 2023-03-18 | End: 2023-03-18 | Stop reason: SDUPTHER

## 2023-03-18 NOTE — PROGRESS NOTES
Patient reported low back pain with back spasms , will call in a limited supply of  flexeril 5 mg Q HS prn  10  NR

## 2023-03-24 ENCOUNTER — OFFICE VISIT (OUTPATIENT)
Dept: ORTHOPEDIC SURGERY | Age: 88
End: 2023-03-24

## 2023-03-24 VITALS — WEIGHT: 110 LBS | RESPIRATION RATE: 16 BRPM | BODY MASS INDEX: 23.09 KG/M2 | HEIGHT: 58 IN

## 2023-03-24 DIAGNOSIS — S62.619A CLOSED DISPLACED FRACTURE OF PROXIMAL PHALANX OF FINGER, UNSPECIFIED FINGER, INITIAL ENCOUNTER: Primary | ICD-10-CM

## 2023-03-24 NOTE — PATIENT INSTRUCTIONS
CAST CARE INSTRUCTIONS    Elevate your injured limb to reduce swelling. Elevate above your heart level. If upper extremity (i.e. hand/wrist) elevate fingers above eye level. Exercise fingers & toes frequently. Do not stick anything inside your cast to scratch. Using a  or sharp object to scratch under a cast can be harmful and irritating to the skin. This can cause an infection with long-term problems. Itching is usually caused by moisture and/or perspiration so keep your cast dry. If after a few days the itching persist you may want to sprinkle some baby powder into the end of the cast.  This should never be done if you have any incisions, sores or pins under the cast.     Never get your cast wet. If you have any type of incision, sores or pins under the cast this can lead to an infection. Cast protectors for showering are available in our office as well as most medical supply [employer] and some pharmacies. Ask the technician for instructions for swimming. IF YOUR CAST BECOMES WET CONTACT OUR OFFICE. For a walking cast you must wear a cast shoe at all times when on your feet. Going without the shoe will cause the cast to crack on the bottom. Injury can also result from slipping without the protection of a cast shoe. If your cast begins to crack, contact our office. .  Casts are never completely comfortable and some pain and swelling are common. Below are some warning signs. You should contact your doctor if you experience:  Extreme/worsening pain. Numbness or tingling. New increasing tightness. Swollen, blue or cold fingers or toes. Rubbing from the cast that persists and causes pain. HUMUROUS RULES FOR KIDS    Do not hit anyone with your cast, especially brothers and sisters. If your cast itches, scratch the opposite arm or leg. Do not stick anything in your cast.  We will keep any lunch money that is found.

## 2023-03-24 NOTE — PROGRESS NOTES
I applied a Short Arm Fiberglass Cast incorporating the Index Finger, Middle Finger, Ring Finger, and Small Finger in an intrinsic safe postion to Kvng Right, Wrist.  I applied casting stockinette,  1 rolls of padding in an overlapping fashion. Michael Jansen requested Black color fiberglass. I rolled 2 rolls of fiberglass in an overlapping fashion. Her  Right, Wrist was maintained in a 90 degree Flexion. At the conclusion of the procedure, Ivon Lyn's nail beds were pink in color, the extremity is warm to the touch. Capillary refill is less than 2 seconds. Michael Jansen was instructed in proper care of cast.  Do not get wet, keep all items out of cast.  If cast is painful please make appointment to get checked. She was also briefed on circulation compromise. If digits are cold, blue, and tingling patient must must seek care. If after hours patient is to go to Emergency Room. During office hours patient must come in to office.

## 2023-03-24 NOTE — PROGRESS NOTES
Ms. Juwan Pablo returns today in follow-up of her recent right Index Finger, Middle Finger, Ring Finger, and Small Finger Proximal Phalanx Fracture Repair which was done 1 week ago. She has noted varying discomfort and decreased swelling. She notes no symptoms of numbness, tingling, no symptoms related to perfusion. Physical Exam:  Skin incisions & pin sites are healing well, without erythema, drainage or sign of infection. Digital range of motion is not assessed due to the presence of the un-healed fracture. Wrist range of motion is mildly stiff from immobilization. Sensation is normal in the Whole Hand. Vascular examination reveals normal, good capillary refill, and good color. Swelling is mild. Clinical alignment and rotation are normal.      Radiographic Evaluation:  Radiographs were obtained today (3 views of the right  Whole Hand). They demonstrate fair maintenance of alignment of the fracture fragments, no rotational deformity, & mild amount of interval healing of the fracture. There has not been evidence of hardware loosening or failure. The fracture does not appear to be healed at this time. Impression:  Ms. Juwan Pablo is doing well after recent right Index Finger, Middle Finger, Ring Finger, and Small Finger  Proximal Phalanx  Fracture Repair. It would appear that she has not fully healed her fracture. Plan:  Ms. Juwan Pablo is today returned to an appropriately applied well padded cast incorporating the necessary digits in an intrinsic-safe position. She is advised regarding the appropriate care of, wear, and use of this device. She is also instructed in  work on Active & Passive range of motion of the exposed digits & elbow. These modalities were demonstrated to her today. We discussed the continued restrictions on the use of the injured hand & wrist and the limitations on resumption of activities.     We discussed the option of pursuing formalized hand

## 2023-03-31 PROBLEM — S62.619A: Status: ACTIVE | Noted: 2023-03-31

## 2023-04-03 DIAGNOSIS — S62.91XD: ICD-10-CM

## 2023-04-03 DIAGNOSIS — S62.616A CLOSED DISPLACED FRACTURE OF PROXIMAL PHALANX OF RIGHT LITTLE FINGER, INITIAL ENCOUNTER: Primary | ICD-10-CM

## 2023-04-03 RX ORDER — DOXEPIN HYDROCHLORIDE 100 MG/1
100 CAPSULE ORAL NIGHTLY
Qty: 30 CAPSULE | Refills: 3 | Status: SHIPPED | OUTPATIENT
Start: 2023-04-03 | End: 2024-04-02

## 2023-04-04 RX ORDER — BENZONATATE 200 MG/1
200 CAPSULE ORAL 2 TIMES DAILY PRN
Qty: 30 CAPSULE | Refills: 3 | Status: SHIPPED | OUTPATIENT
Start: 2023-04-04 | End: 2023-04-06

## 2023-04-04 RX ORDER — DEXTROMETHORPHAN HYDROBROMIDE AND PROMETHAZINE HYDROCHLORIDE 15; 6.25 MG/5ML; MG/5ML
10 SYRUP ORAL NIGHTLY
Qty: 300 ML | Refills: 2 | Status: SHIPPED | OUTPATIENT
Start: 2023-04-04 | End: 2023-05-04

## 2023-04-06 RX ORDER — BENZONATATE 100 MG/1
100 CAPSULE ORAL 2 TIMES DAILY PRN
Qty: 180 CAPSULE | Refills: 3 | Status: SHIPPED | OUTPATIENT
Start: 2023-04-06 | End: 2023-07-05

## 2023-04-21 ENCOUNTER — TELEPHONE (OUTPATIENT)
Dept: ORTHOPEDIC SURGERY | Age: 88
End: 2023-04-21

## 2023-04-21 DIAGNOSIS — S62.92XS: Primary | ICD-10-CM

## 2023-04-21 RX ORDER — HYDROCODONE BITARTRATE AND ACETAMINOPHEN 10; 325 MG/1; MG/1
1 TABLET ORAL EVERY 6 HOURS PRN
Qty: 28 TABLET | Refills: 0 | Status: SHIPPED | OUTPATIENT
Start: 2023-04-21 | End: 2023-04-28

## 2023-04-23 RX ORDER — BUDESONIDE, GLYCOPYRROLATE, AND FORMOTEROL FUMARATE 160; 9; 4.8 UG/1; UG/1; UG/1
1 AEROSOL, METERED RESPIRATORY (INHALATION) 2 TIMES DAILY
Qty: 1 EACH | Refills: 1 | Status: SHIPPED | OUTPATIENT
Start: 2023-04-23 | End: 2023-05-23

## 2023-08-22 RX ORDER — DOXEPIN HYDROCHLORIDE 100 MG/1
100 CAPSULE ORAL NIGHTLY
Qty: 90 CAPSULE | Refills: 3 | Status: SHIPPED | OUTPATIENT
Start: 2023-08-22 | End: 2024-08-21

## 2023-08-22 RX ORDER — DOXEPIN HYDROCHLORIDE 100 MG/1
100 CAPSULE ORAL NIGHTLY
Qty: 30 CAPSULE | Refills: 3 | Status: SHIPPED | OUTPATIENT
Start: 2023-08-22 | End: 2023-08-22 | Stop reason: SDUPTHER

## 2023-09-08 DIAGNOSIS — J84.10 CHRONIC FIBROSIS OF LUNG (HCC): Primary | ICD-10-CM

## 2023-09-08 DIAGNOSIS — R05.3 CHRONIC COUGH: ICD-10-CM

## 2023-09-08 RX ORDER — BENZONATATE 200 MG/1
200 CAPSULE ORAL 3 TIMES DAILY PRN
Qty: 30 CAPSULE | Refills: 1 | Status: SHIPPED | OUTPATIENT
Start: 2023-09-08 | End: 2023-10-08

## 2023-09-08 RX ORDER — GUAIFENESIN AND CODEINE PHOSPHATE 100; 10 MG/5ML; MG/5ML
5 SOLUTION ORAL EVERY 4 HOURS PRN
Qty: 300 ML | Refills: 1 | Status: SHIPPED | OUTPATIENT
Start: 2023-09-08 | End: 2023-09-28

## 2023-09-08 NOTE — PROGRESS NOTES
Phenergan with ayannae cough syrup not available [facility]

## 2023-09-30 DIAGNOSIS — R49.0 HOARSENESS OF VOICE: ICD-10-CM

## 2023-09-30 DIAGNOSIS — R05.3 PERSISTENT COUGH: Primary | ICD-10-CM

## 2023-09-30 NOTE — PROGRESS NOTES
Patient has persistent dry cough all other etiology considered and treated  , will refer to Dr Tami Lambert .  Discussed the case with him in person

## 2023-10-06 DIAGNOSIS — J84.10 CHRONIC FIBROSIS OF LUNG (HCC): ICD-10-CM

## 2023-10-06 DIAGNOSIS — R05.3 CHRONIC COUGH: ICD-10-CM

## 2023-10-06 DIAGNOSIS — F51.01 PRIMARY INSOMNIA: Primary | ICD-10-CM

## 2023-10-06 RX ORDER — CODEINE PHOSPHATE/GUAIFENESIN 10-100MG/5
5 LIQUID (ML) ORAL EVERY 4 HOURS PRN
Qty: 300 ML | Refills: 1 | Status: SHIPPED | OUTPATIENT
Start: 2023-10-06 | End: 2023-10-26

## 2023-10-06 RX ORDER — BENZONATATE 200 MG/1
200 CAPSULE ORAL 3 TIMES DAILY PRN
Qty: 30 CAPSULE | Refills: 1 | Status: SHIPPED | OUTPATIENT
Start: 2023-10-06 | End: 2023-11-05

## 2023-10-06 RX ORDER — ZOLPIDEM TARTRATE 5 MG/1
5 TABLET ORAL NIGHTLY PRN
Qty: 30 TABLET | Refills: 2 | Status: SHIPPED | OUTPATIENT
Start: 2023-10-06 | End: 2024-01-04

## 2023-10-16 ENCOUNTER — OFFICE VISIT (OUTPATIENT)
Dept: ENT CLINIC | Age: 88
End: 2023-10-16
Payer: MEDICARE

## 2023-10-16 VITALS
WEIGHT: 110 LBS | DIASTOLIC BLOOD PRESSURE: 75 MMHG | BODY MASS INDEX: 23.09 KG/M2 | SYSTOLIC BLOOD PRESSURE: 119 MMHG | HEART RATE: 73 BPM | TEMPERATURE: 97.5 F | OXYGEN SATURATION: 94 % | HEIGHT: 58 IN

## 2023-10-16 DIAGNOSIS — H61.23 BILATERAL IMPACTED CERUMEN: ICD-10-CM

## 2023-10-16 DIAGNOSIS — K21.9 LARYNGOPHARYNGEAL REFLUX (LPR): ICD-10-CM

## 2023-10-16 DIAGNOSIS — J30.9 ALLERGIC RHINITIS, UNSPECIFIED SEASONALITY, UNSPECIFIED TRIGGER: ICD-10-CM

## 2023-10-16 DIAGNOSIS — R49.0 DYSPHONIA: Primary | ICD-10-CM

## 2023-10-16 DIAGNOSIS — J34.89 NASAL SEPTAL PERFORATION: ICD-10-CM

## 2023-10-16 PROCEDURE — 31575 DIAGNOSTIC LARYNGOSCOPY: CPT | Performed by: STUDENT IN AN ORGANIZED HEALTH CARE EDUCATION/TRAINING PROGRAM

## 2023-10-16 PROCEDURE — 69210 REMOVE IMPACTED EAR WAX UNI: CPT | Performed by: STUDENT IN AN ORGANIZED HEALTH CARE EDUCATION/TRAINING PROGRAM

## 2023-10-16 PROCEDURE — 1090F PRES/ABSN URINE INCON ASSESS: CPT | Performed by: STUDENT IN AN ORGANIZED HEALTH CARE EDUCATION/TRAINING PROGRAM

## 2023-10-16 PROCEDURE — 1123F ACP DISCUSS/DSCN MKR DOCD: CPT | Performed by: STUDENT IN AN ORGANIZED HEALTH CARE EDUCATION/TRAINING PROGRAM

## 2023-10-16 PROCEDURE — G8482 FLU IMMUNIZE ORDER/ADMIN: HCPCS | Performed by: STUDENT IN AN ORGANIZED HEALTH CARE EDUCATION/TRAINING PROGRAM

## 2023-10-16 PROCEDURE — G8420 CALC BMI NORM PARAMETERS: HCPCS | Performed by: STUDENT IN AN ORGANIZED HEALTH CARE EDUCATION/TRAINING PROGRAM

## 2023-10-16 PROCEDURE — 99204 OFFICE O/P NEW MOD 45 MIN: CPT | Performed by: STUDENT IN AN ORGANIZED HEALTH CARE EDUCATION/TRAINING PROGRAM

## 2023-10-16 PROCEDURE — G8427 DOCREV CUR MEDS BY ELIG CLIN: HCPCS | Performed by: STUDENT IN AN ORGANIZED HEALTH CARE EDUCATION/TRAINING PROGRAM

## 2023-10-16 PROCEDURE — 1036F TOBACCO NON-USER: CPT | Performed by: STUDENT IN AN ORGANIZED HEALTH CARE EDUCATION/TRAINING PROGRAM

## 2023-10-30 RX ORDER — TRIAMCINOLONE ACETONIDE 0.1 %
PASTE (GRAM) DENTAL
Qty: 1 EACH | Refills: 1 | Status: SHIPPED | OUTPATIENT
Start: 2023-10-30 | End: 2023-11-06

## 2023-12-04 RX ORDER — MONTELUKAST SODIUM 10 MG/1
10 TABLET ORAL DAILY
Qty: 90 TABLET | Refills: 1 | Status: SHIPPED | OUTPATIENT
Start: 2023-12-04 | End: 2024-01-06 | Stop reason: SDUPTHER

## 2023-12-25 RX ORDER — DOXEPIN HYDROCHLORIDE 100 MG/1
100 CAPSULE ORAL NIGHTLY
Qty: 90 CAPSULE | Refills: 3 | Status: SHIPPED | OUTPATIENT
Start: 2023-12-25 | End: 2024-12-24

## 2024-01-06 DIAGNOSIS — J45.20 MILD INTERMITTENT ASTHMA WITHOUT COMPLICATION: ICD-10-CM

## 2024-01-06 RX ORDER — OMEPRAZOLE 40 MG/1
40 CAPSULE, DELAYED RELEASE ORAL DAILY
Qty: 90 CAPSULE | Refills: 3 | Status: SHIPPED | OUTPATIENT
Start: 2024-01-06 | End: 2024-12-31

## 2024-01-06 RX ORDER — ALBUTEROL SULFATE 90 UG/1
2 AEROSOL, METERED RESPIRATORY (INHALATION) EVERY 4 HOURS PRN
Qty: 25.5 G | Refills: 5 | Status: SHIPPED | OUTPATIENT
Start: 2024-01-06 | End: 2024-05-05

## 2024-01-06 RX ORDER — BUDESONIDE, GLYCOPYRROLATE, AND FORMOTEROL FUMARATE 160; 9; 4.8 UG/1; UG/1; UG/1
2 AEROSOL, METERED RESPIRATORY (INHALATION) 2 TIMES DAILY
Qty: 2 EACH | Refills: 5 | Status: SHIPPED | OUTPATIENT
Start: 2024-01-06

## 2024-01-06 RX ORDER — MONTELUKAST SODIUM 10 MG/1
10 TABLET ORAL DAILY
Qty: 90 TABLET | Refills: 1 | Status: SHIPPED | OUTPATIENT
Start: 2024-01-06 | End: 2024-07-04

## 2024-02-16 DIAGNOSIS — R05.3 CHRONIC COUGH: ICD-10-CM

## 2024-02-16 DIAGNOSIS — J84.10 CHRONIC FIBROSIS OF LUNG (HCC): ICD-10-CM

## 2024-02-16 RX ORDER — BROMPHENIRAMINE MALEATE, PSEUDOEPHEDRINE HYDROCHLORIDE, AND DEXTROMETHORPHAN HYDROBROMIDE 2; 30; 10 MG/5ML; MG/5ML; MG/5ML
5 SYRUP ORAL 4 TIMES DAILY PRN
Qty: 300 ML | Refills: 1 | Status: SHIPPED | OUTPATIENT
Start: 2024-02-16 | End: 2024-03-17

## 2024-02-16 RX ORDER — BENZONATATE 200 MG/1
200 CAPSULE ORAL 3 TIMES DAILY PRN
Qty: 60 CAPSULE | Refills: 0 | Status: SHIPPED | OUTPATIENT
Start: 2024-02-16 | End: 2024-03-17

## 2024-03-02 RX ORDER — BENZOCAINE/MENTHOL 6 MG-10 MG
LOZENGE MUCOUS MEMBRANE
Qty: 60 G | Refills: 1 | Status: SHIPPED | OUTPATIENT
Start: 2024-03-02 | End: 2024-03-09

## 2024-03-02 RX ORDER — GUAIFENESIN 600 MG/1
600 TABLET, EXTENDED RELEASE ORAL 2 TIMES DAILY
Qty: 30 TABLET | Refills: 0 | Status: SHIPPED | OUTPATIENT
Start: 2024-03-02 | End: 2024-03-17

## 2024-03-02 RX ORDER — FLUCONAZOLE 100 MG/1
100 TABLET ORAL DAILY
Qty: 3 TABLET | Refills: 0 | Status: SHIPPED | OUTPATIENT
Start: 2024-03-02 | End: 2024-03-05

## 2024-03-02 RX ORDER — CEFDINIR 300 MG/1
300 CAPSULE ORAL 2 TIMES DAILY
Qty: 14 CAPSULE | Refills: 0 | Status: SHIPPED | OUTPATIENT
Start: 2024-03-02 | End: 2024-03-09

## 2024-03-03 ENCOUNTER — HOSPITAL ENCOUNTER (OUTPATIENT)
Age: 89
Discharge: HOME | End: 2024-03-03
Payer: MEDICARE

## 2024-03-03 ENCOUNTER — HOSPITAL ENCOUNTER (OUTPATIENT)
Dept: GENERAL RADIOLOGY | Age: 89
Discharge: HOME | End: 2024-03-03
Payer: MEDICARE

## 2024-03-03 DIAGNOSIS — E11.9 CONTROLLED TYPE 2 DIABETES MELLITUS WITHOUT COMPLICATION, WITHOUT LONG-TERM CURRENT USE OF INSULIN (HCC): Primary | ICD-10-CM

## 2024-03-03 DIAGNOSIS — J47.1 BRONCHIECTASIS WITH ACUTE EXACERBATION (HCC): Primary | ICD-10-CM

## 2024-03-03 DIAGNOSIS — J45.21 INTERMITTENT ASTHMA WITH ACUTE EXACERBATION, UNSPECIFIED ASTHMA SEVERITY: ICD-10-CM

## 2024-03-03 DIAGNOSIS — I27.20 PULMONARY HYPERTENSION (HCC): ICD-10-CM

## 2024-03-03 DIAGNOSIS — J45.41 MODERATE PERSISTENT ASTHMA WITH EXACERBATION: Primary | ICD-10-CM

## 2024-03-03 DIAGNOSIS — J47.1 BRONCHIECTASIS WITH ACUTE EXACERBATION (HCC): ICD-10-CM

## 2024-03-03 LAB
ALBUMIN SERPL-MCNC: 4 G/DL (ref 3.4–5)
ALBUMIN/GLOB SERPL: 1.4 {RATIO} (ref 1.1–2.2)
ALP SERPL-CCNC: 93 U/L (ref 40–129)
ALT SERPL-CCNC: 39 U/L (ref 10–40)
ANION GAP SERPL CALCULATED.3IONS-SCNC: 9 MMOL/L (ref 3–16)
AST SERPL-CCNC: 20 U/L (ref 15–37)
BILIRUB SERPL-MCNC: 0.4 MG/DL (ref 0–1)
BUN SERPL-MCNC: 20 MG/DL (ref 7–20)
CALCIUM SERPL-MCNC: 9.6 MG/DL (ref 8.3–10.6)
CHLORIDE SERPL-SCNC: 93 MMOL/L (ref 99–110)
CO2 SERPL-SCNC: 33 MMOL/L (ref 21–32)
CREAT SERPL-MCNC: 0.6 MG/DL (ref 0.6–1.2)
CREAT UR-MCNC: 36.2 MG/DL (ref 28–259)
DEPRECATED RDW RBC AUTO: 14.3 % (ref 12.4–15.4)
GFR SERPLBLD CREATININE-BSD FMLA CKD-EPI: >60 ML/MIN/{1.73_M2}
GLUCOSE SERPL-MCNC: 158 MG/DL (ref 70–99)
HCT VFR BLD AUTO: 47 % (ref 36–48)
HGB BLD-MCNC: 15.4 G/DL (ref 12–16)
MCH RBC QN AUTO: 29.4 PG (ref 26–34)
MCHC RBC AUTO-ENTMCNC: 32.7 G/DL (ref 31–36)
MCV RBC AUTO: 89.8 FL (ref 80–100)
MICROALBUMIN UR DL<=1MG/L-MCNC: 1.6 MG/DL
MICROALBUMIN/CREAT UR: 44.2 MG/G (ref 0–30)
PLATELET # BLD AUTO: 219 K/UL (ref 135–450)
PLATELET BLD QL SMEAR: ABNORMAL
PMV BLD AUTO: 8.8 FL (ref 5–10.5)
POTASSIUM SERPL-SCNC: 4.2 MMOL/L (ref 3.5–5.1)
PROT SERPL-MCNC: 6.9 G/DL (ref 6.4–8.2)
RBC # BLD AUTO: 5.23 M/UL (ref 4–5.2)
SLIDE REVIEW: ABNORMAL
SODIUM SERPL-SCNC: 135 MMOL/L (ref 136–145)
WBC # BLD AUTO: 15.7 K/UL (ref 4–11)

## 2024-03-03 PROCEDURE — 82570 ASSAY OF URINE CREATININE: CPT

## 2024-03-03 PROCEDURE — 82043 UR ALBUMIN QUANTITATIVE: CPT

## 2024-03-03 PROCEDURE — 84443 ASSAY THYROID STIM HORMONE: CPT

## 2024-03-03 PROCEDURE — 71046 X-RAY EXAM CHEST 2 VIEWS: CPT

## 2024-03-03 PROCEDURE — 36415 COLL VENOUS BLD VENIPUNCTURE: CPT

## 2024-03-03 PROCEDURE — 80061 LIPID PANEL: CPT

## 2024-03-03 PROCEDURE — 83036 HEMOGLOBIN GLYCOSYLATED A1C: CPT

## 2024-03-03 PROCEDURE — 80053 COMPREHEN METABOLIC PANEL: CPT

## 2024-03-03 PROCEDURE — 85027 COMPLETE CBC AUTOMATED: CPT

## 2024-03-04 ENCOUNTER — HOSPITAL ENCOUNTER (OUTPATIENT)
Dept: CT IMAGING | Age: 89
Discharge: HOME | End: 2024-03-04
Attending: INTERNAL MEDICINE
Payer: MEDICARE

## 2024-03-04 DIAGNOSIS — J47.1 BRONCHIECTASIS WITH ACUTE EXACERBATION (HCC): ICD-10-CM

## 2024-03-04 DIAGNOSIS — J45.41 MODERATE PERSISTENT ASTHMA WITH EXACERBATION: ICD-10-CM

## 2024-03-04 DIAGNOSIS — I27.20 PULMONARY HYPERTENSION (HCC): ICD-10-CM

## 2024-03-04 LAB
CHOLEST SERPL-MCNC: 156 MG/DL (ref 0–199)
EST. AVERAGE GLUCOSE BLD GHB EST-MCNC: 174.3 MG/DL
HBA1C MFR BLD: 7.7 %
HDLC SERPL-MCNC: 44 MG/DL (ref 40–60)
LDLC SERPL CALC-MCNC: 62 MG/DL
TRIGL SERPL-MCNC: 249 MG/DL (ref 0–150)
TSH SERPL DL<=0.005 MIU/L-ACNC: 4.15 UIU/ML (ref 0.27–4.2)
VLDLC SERPL CALC-MCNC: 50 MG/DL

## 2024-03-04 PROCEDURE — 71260 CT THORAX DX C+: CPT

## 2024-03-04 PROCEDURE — 6360000004 HC RX CONTRAST MEDICATION: Performed by: INTERNAL MEDICINE

## 2024-03-04 RX ADMIN — IOPAMIDOL 75 ML: 755 INJECTION, SOLUTION INTRAVENOUS at 12:53

## 2024-03-05 DIAGNOSIS — J44.1 COPD WITH ACUTE EXACERBATION (HCC): Primary | ICD-10-CM

## 2024-03-05 RX ORDER — IPRATROPIUM BROMIDE AND ALBUTEROL SULFATE 2.5; .5 MG/3ML; MG/3ML
1 SOLUTION RESPIRATORY (INHALATION) EVERY 4 HOURS
Qty: 360 ML | Refills: 2 | Status: SHIPPED | OUTPATIENT
Start: 2024-03-05

## 2024-03-08 ENCOUNTER — OFFICE VISIT (OUTPATIENT)
Dept: PULMONOLOGY | Age: 89
End: 2024-03-08
Payer: MEDICARE

## 2024-03-08 VITALS — BODY MASS INDEX: 23.83 KG/M2 | HEIGHT: 58 IN

## 2024-03-08 DIAGNOSIS — J44.1 CHRONIC OBSTRUCTIVE PULMONARY DISEASE WITH ACUTE EXACERBATION (HCC): Primary | ICD-10-CM

## 2024-03-08 PROCEDURE — 99214 OFFICE O/P EST MOD 30 MIN: CPT | Performed by: INTERNAL MEDICINE

## 2024-03-08 PROCEDURE — 1123F ACP DISCUSS/DSCN MKR DOCD: CPT | Performed by: INTERNAL MEDICINE

## 2024-03-08 PROCEDURE — 1036F TOBACCO NON-USER: CPT | Performed by: INTERNAL MEDICINE

## 2024-03-08 PROCEDURE — G8428 CUR MEDS NOT DOCUMENT: HCPCS | Performed by: INTERNAL MEDICINE

## 2024-03-08 PROCEDURE — G8482 FLU IMMUNIZE ORDER/ADMIN: HCPCS | Performed by: INTERNAL MEDICINE

## 2024-03-08 PROCEDURE — 1090F PRES/ABSN URINE INCON ASSESS: CPT | Performed by: INTERNAL MEDICINE

## 2024-03-08 PROCEDURE — G8420 CALC BMI NORM PARAMETERS: HCPCS | Performed by: INTERNAL MEDICINE

## 2024-03-08 PROCEDURE — 3023F SPIROM DOC REV: CPT | Performed by: INTERNAL MEDICINE

## 2024-03-08 RX ORDER — PREDNISONE 10 MG/1
TABLET ORAL
Qty: 15 TABLET | Refills: 0 | Status: SHIPPED | OUTPATIENT
Start: 2024-03-08

## 2024-03-08 RX ORDER — ALBUTEROL SULFATE 2.5 MG/3ML
2.5 SOLUTION RESPIRATORY (INHALATION) EVERY 6 HOURS PRN
Qty: 360 EACH | Refills: 5 | Status: SHIPPED | OUTPATIENT
Start: 2024-03-08

## 2024-03-08 ASSESSMENT — ENCOUNTER SYMPTOMS
SINUS PRESSURE: 0
WHEEZING: 1
BACK PAIN: 0
APNEA: 0
VOICE CHANGE: 0
RHINORRHEA: 0
COUGH: 1
SHORTNESS OF BREATH: 1
DIARRHEA: 0
BLOOD IN STOOL: 0
CHOKING: 0
COLOR CHANGE: 0
ABDOMINAL DISTENTION: 0
CHEST TIGHTNESS: 1
CONSTIPATION: 0
SORE THROAT: 0
STRIDOR: 0
ABDOMINAL PAIN: 0
VOMITING: 0

## 2024-03-08 NOTE — PROGRESS NOTES
FINGER AND SMALL FINGER PROXIMAL PHALANX FRACTURES performed by Jayant Carpenter MD at [facility] OR    JOINT REPLACEMENT  04/22/2010    left knee    PATELLA FRACTURE SURGERY Left 5/2/2021    PATELLA OPEN REDUCTION INTERNAL FIXATION performed by Will May MD at [facility] OR    TOTAL KNEE ARTHROPLASTY  8/19/2010    right, cemented     Family History   Problem Relation Age of Onset    Arthritis Mother        Review of Systems:  Review of Systems   Constitutional:  Positive for fatigue. Negative for activity change, appetite change, fever and unexpected weight change.   HENT:  Positive for congestion. Negative for ear discharge, ear pain, postnasal drip, rhinorrhea, sinus pressure, sneezing, sore throat, tinnitus and voice change.    Respiratory:  Positive for cough, chest tightness, shortness of breath and wheezing. Negative for apnea, choking and stridor.    Cardiovascular:  Negative for chest pain, palpitations and leg swelling.   Gastrointestinal:  Negative for abdominal distention, abdominal pain, blood in stool, constipation, diarrhea and vomiting.   Musculoskeletal:  Negative for arthralgias, back pain and gait problem.   Skin:  Negative for color change, pallor and rash.   Allergic/Immunologic: Negative for environmental allergies.   Neurological:  Negative for dizziness, tremors, seizures, syncope, speech difficulty, weakness, light-headedness, numbness and headaches.   Hematological:  Negative for adenopathy. Does not bruise/bleed easily.   Psychiatric/Behavioral:  Negative for sleep disturbance.        Vitals:    03/08/24 1642   Height: 1.473 m (4' 10\")          No data to display               Body mass index is 23.83 kg/m².     Wt Readings from Last 3 Encounters:   03/04/24 51.7 kg (114 lb)   12/08/23 50.8 kg (112 lb)   10/16/23 49.9 kg (110 lb)     BP Readings from Last 3 Encounters:   03/04/24 138/78   12/08/23 138/85   10/16/23 119/75         Physical Exam  Constitutional:       General: She is not in acute

## 2024-04-06 DIAGNOSIS — J44.1 COPD WITH ACUTE EXACERBATION (HCC): ICD-10-CM

## 2024-04-06 RX ORDER — IPRATROPIUM BROMIDE AND ALBUTEROL SULFATE 2.5; .5 MG/3ML; MG/3ML
1 SOLUTION RESPIRATORY (INHALATION) EVERY 4 HOURS
Qty: 360 ML | Refills: 2 | Status: SHIPPED | OUTPATIENT
Start: 2024-04-06

## 2024-04-23 PROBLEM — M70.51 PES ANSERINUS BURSITIS OF RIGHT KNEE: Status: ACTIVE | Noted: 2024-04-23

## 2024-06-27 RX ORDER — AMLODIPINE BESYLATE 10 MG/1
10 TABLET ORAL DAILY
Qty: 90 TABLET | Refills: 0 | Status: SHIPPED | OUTPATIENT
Start: 2024-06-27 | End: 2024-06-30 | Stop reason: SDUPTHER

## 2024-06-27 RX ORDER — VALSARTAN AND HYDROCHLOROTHIAZIDE 320; 12.5 MG/1; MG/1
1 TABLET, FILM COATED ORAL DAILY
Qty: 90 TABLET | Refills: 0 | Status: SHIPPED | OUTPATIENT
Start: 2024-06-27 | End: 2024-06-30 | Stop reason: SDUPTHER

## 2024-06-27 RX ORDER — METOPROLOL TARTRATE 100 MG/1
100 TABLET ORAL 2 TIMES DAILY
Qty: 180 TABLET | Refills: 0 | Status: SHIPPED | OUTPATIENT
Start: 2024-06-27 | End: 2024-06-30 | Stop reason: SDUPTHER

## 2024-06-27 RX ORDER — MONTELUKAST SODIUM 10 MG/1
10 TABLET ORAL DAILY
Qty: 90 TABLET | Refills: 0 | Status: SHIPPED | OUTPATIENT
Start: 2024-06-27 | End: 2024-09-25

## 2024-06-30 RX ORDER — METOPROLOL TARTRATE 100 MG/1
100 TABLET ORAL 2 TIMES DAILY
Qty: 60 TABLET | Refills: 0 | Status: SHIPPED | OUTPATIENT
Start: 2024-06-30 | End: 2024-07-30

## 2024-06-30 RX ORDER — AMLODIPINE BESYLATE 10 MG/1
10 TABLET ORAL DAILY
Qty: 30 TABLET | Refills: 0 | Status: SHIPPED | OUTPATIENT
Start: 2024-06-30 | End: 2024-07-30

## 2024-06-30 RX ORDER — VALSARTAN AND HYDROCHLOROTHIAZIDE 320; 12.5 MG/1; MG/1
1 TABLET, FILM COATED ORAL DAILY
Qty: 30 TABLET | Refills: 0 | Status: SHIPPED | OUTPATIENT
Start: 2024-06-30

## 2024-08-06 DIAGNOSIS — J44.1 COPD WITH ACUTE EXACERBATION (HCC): ICD-10-CM

## 2024-08-06 RX ORDER — IPRATROPIUM BROMIDE AND ALBUTEROL SULFATE 2.5; .5 MG/3ML; MG/3ML
1 SOLUTION RESPIRATORY (INHALATION) EVERY 4 HOURS
Qty: 720 ML | Refills: 1 | Status: SHIPPED | OUTPATIENT
Start: 2024-08-06

## 2024-08-11 RX ORDER — CLOTRIMAZOLE 1 %
CREAM (GRAM) TOPICAL
Qty: 60 G | Refills: 1 | Status: SHIPPED | OUTPATIENT
Start: 2024-08-11 | End: 2024-08-18

## 2024-08-13 ENCOUNTER — OFFICE VISIT (OUTPATIENT)
Dept: ENT CLINIC | Age: 89
End: 2024-08-13
Payer: MEDICARE

## 2024-08-13 VITALS
DIASTOLIC BLOOD PRESSURE: 76 MMHG | OXYGEN SATURATION: 93 % | SYSTOLIC BLOOD PRESSURE: 128 MMHG | HEIGHT: 58 IN | BODY MASS INDEX: 24.35 KG/M2 | WEIGHT: 116 LBS | TEMPERATURE: 97.1 F | HEART RATE: 65 BPM

## 2024-08-13 DIAGNOSIS — R49.0 DYSPHONIA: Primary | ICD-10-CM

## 2024-08-13 DIAGNOSIS — J38.7 PRESBYLARYNGES: ICD-10-CM

## 2024-08-13 DIAGNOSIS — H61.23 BILATERAL IMPACTED CERUMEN: ICD-10-CM

## 2024-08-13 DIAGNOSIS — K21.9 LARYNGOPHARYNGEAL REFLUX (LPR): ICD-10-CM

## 2024-08-13 PROCEDURE — 1090F PRES/ABSN URINE INCON ASSESS: CPT | Performed by: STUDENT IN AN ORGANIZED HEALTH CARE EDUCATION/TRAINING PROGRAM

## 2024-08-13 PROCEDURE — G8420 CALC BMI NORM PARAMETERS: HCPCS | Performed by: STUDENT IN AN ORGANIZED HEALTH CARE EDUCATION/TRAINING PROGRAM

## 2024-08-13 PROCEDURE — G8427 DOCREV CUR MEDS BY ELIG CLIN: HCPCS | Performed by: STUDENT IN AN ORGANIZED HEALTH CARE EDUCATION/TRAINING PROGRAM

## 2024-08-13 PROCEDURE — 69210 REMOVE IMPACTED EAR WAX UNI: CPT | Performed by: STUDENT IN AN ORGANIZED HEALTH CARE EDUCATION/TRAINING PROGRAM

## 2024-08-13 PROCEDURE — 31575 DIAGNOSTIC LARYNGOSCOPY: CPT | Performed by: STUDENT IN AN ORGANIZED HEALTH CARE EDUCATION/TRAINING PROGRAM

## 2024-08-13 PROCEDURE — 99213 OFFICE O/P EST LOW 20 MIN: CPT | Performed by: STUDENT IN AN ORGANIZED HEALTH CARE EDUCATION/TRAINING PROGRAM

## 2024-08-13 PROCEDURE — 1123F ACP DISCUSS/DSCN MKR DOCD: CPT | Performed by: STUDENT IN AN ORGANIZED HEALTH CARE EDUCATION/TRAINING PROGRAM

## 2024-08-13 PROCEDURE — 1036F TOBACCO NON-USER: CPT | Performed by: STUDENT IN AN ORGANIZED HEALTH CARE EDUCATION/TRAINING PROGRAM

## 2024-08-13 RX ORDER — FAMOTIDINE 20 MG/1
20 TABLET, FILM COATED ORAL NIGHTLY
Qty: 60 TABLET | Refills: 3 | Status: SHIPPED | OUTPATIENT
Start: 2024-08-13

## 2024-08-13 NOTE — PROGRESS NOTES
[facility]  DIVISION OF OTOLARYNGOLOGY- HEAD & NECK SURGERY  CLINIC FOLLOW-UP VISIT      Patient Name: Ivon Lyn  Medical Record Number:  [id number]  Primary Care Physician:  Beatriz Schwartz MD    ChiefComplaint     Chief Complaint   Patient presents with    Follow-up     Voice crackling,        History of Present Illness     Ivon Lyn is an 88 y.o. female previously seen for dysphonia, LPR, allergic rhinitis, nasal septal perforation, bilateral impacted cerumen.  Last seen 11/2/2023.    Interval History:   Continues to have persistent cough and frequent throat clearing.  Occasionally with cracking of her throat.  She sings frequently and feels like she has to take breaks more often.  She has a history of COPD for which she follows with pulmonology.  Currently taking 40 mg omeprazole daily.    She presents today with her son who is a physician at [employer] in interventional radiology.    Past Medical History     Past Medical History:   Diagnosis Date    Arthritis     Closed fracture of multiple sites of phalanges of hand 3/5/2023    Diabetes mellitus (HCC)     HYPERCHOLESTERAEMIA     Hypertension     TIA (transient ischaemic attack) 2003       Past Surgical History     Past Surgical History:   Procedure Laterality Date    COLECTOMY      FINGER CLOSED REDUCTION Right 3/14/2023    CLOSED REDUCTION AND PERCUTANEOUS PINNING OF RIGHT INDEX FINGER, MIDDLE FINGER, RING FINGER AND SMALL FINGER PROXIMAL PHALANX FRACTURES performed by Jayant Carpenter MD at [facility] OR    JOINT REPLACEMENT  04/22/2010    left knee    PATELLA FRACTURE SURGERY Left 5/2/2021    PATELLA OPEN REDUCTION INTERNAL FIXATION performed by Will May MD at [facility] OR    TOTAL KNEE ARTHROPLASTY  8/19/2010    right, cemented       Family History     Family History   Problem Relation Age of Onset    Arthritis Mother        Social History     Social History     Tobacco Use    Smoking status: Never     Passive exposure: Never

## 2024-08-23 ENCOUNTER — TELEPHONE (OUTPATIENT)
Dept: ENT CLINIC | Age: 89
End: 2024-08-23

## 2024-09-04 RX ORDER — TRAZODONE HYDROCHLORIDE 100 MG/1
100 TABLET ORAL NIGHTLY
Qty: 30 TABLET | Refills: 0 | Status: SHIPPED | OUTPATIENT
Start: 2024-09-04 | End: 2024-10-04

## 2024-09-11 RX ORDER — TRAZODONE HYDROCHLORIDE 100 MG/1
200 TABLET ORAL NIGHTLY
Qty: 60 TABLET | Refills: 1 | Status: SHIPPED | OUTPATIENT
Start: 2024-09-11 | End: 2024-09-12 | Stop reason: SDUPTHER

## 2024-09-11 RX ORDER — TRAZODONE HYDROCHLORIDE 100 MG/1
200 TABLET ORAL NIGHTLY
Qty: 180 TABLET | Refills: 0 | Status: SHIPPED | OUTPATIENT
Start: 2024-09-11

## 2024-09-12 RX ORDER — TRAZODONE HYDROCHLORIDE 100 MG/1
200 TABLET ORAL NIGHTLY
Qty: 60 TABLET | Refills: 1 | Status: SHIPPED | OUTPATIENT
Start: 2024-09-12

## 2024-09-25 ENCOUNTER — PROCEDURE VISIT (OUTPATIENT)
Dept: SPEECH THERAPY | Age: 89
End: 2024-09-25
Payer: MEDICARE

## 2024-09-25 DIAGNOSIS — J38.3 VOCAL FOLD ATROPHY: ICD-10-CM

## 2024-09-25 DIAGNOSIS — J38.7 PRESBYLARYNGES: ICD-10-CM

## 2024-09-25 DIAGNOSIS — R49.0 DYSPHONIA: Primary | ICD-10-CM

## 2024-09-25 DIAGNOSIS — J38.3 GLOTTIC INSUFFICIENCY: ICD-10-CM

## 2024-09-25 PROCEDURE — 92507 TX SP LANG VOICE COMM INDIV: CPT | Performed by: SPEECH-LANGUAGE PATHOLOGIST

## 2024-09-25 PROCEDURE — 31579 LARYNGOSCOPY TELESCOPIC: CPT | Performed by: SPEECH-LANGUAGE PATHOLOGIST

## 2024-09-25 PROCEDURE — 92524 BEHAVRAL QUALIT ANALYS VOICE: CPT | Performed by: SPEECH-LANGUAGE PATHOLOGIST

## 2024-10-25 RX ORDER — DOXEPIN HYDROCHLORIDE 100 MG/1
100 CAPSULE ORAL NIGHTLY
Qty: 90 CAPSULE | Refills: 3 | Status: SHIPPED | OUTPATIENT
Start: 2024-10-25 | End: 2025-10-25

## 2024-12-20 RX ORDER — LEVOTHYROXINE SODIUM 50 UG/1
50 TABLET ORAL DAILY
Qty: 90 TABLET | Refills: 1 | Status: SHIPPED | OUTPATIENT
Start: 2024-12-20

## 2024-12-20 RX ORDER — ATORVASTATIN CALCIUM 10 MG/1
10 TABLET, FILM COATED ORAL DAILY
Qty: 90 TABLET | Refills: 3 | Status: SHIPPED | OUTPATIENT
Start: 2024-12-20 | End: 2025-12-15

## 2024-12-20 RX ORDER — IPRATROPIUM BROMIDE AND ALBUTEROL SULFATE 2.5; .5 MG/3ML; MG/3ML
1 SOLUTION RESPIRATORY (INHALATION) EVERY 4 HOURS
Qty: 360 ML | Refills: 2 | Status: SHIPPED | OUTPATIENT
Start: 2024-12-20

## 2024-12-20 RX ORDER — METOPROLOL TARTRATE 100 MG/1
100 TABLET ORAL 2 TIMES DAILY
Qty: 180 TABLET | Refills: 0 | Status: SHIPPED | OUTPATIENT
Start: 2024-12-20 | End: 2025-03-20

## 2024-12-20 RX ORDER — BENZONATATE 200 MG/1
200 CAPSULE ORAL 3 TIMES DAILY PRN
Qty: 120 CAPSULE | Refills: 0 | Status: SHIPPED | OUTPATIENT
Start: 2024-12-20 | End: 2025-03-20

## 2024-12-20 RX ORDER — BUDESONIDE, GLYCOPYRROLATE, AND FORMOTEROL FUMARATE 160; 9; 4.8 UG/1; UG/1; UG/1
2 AEROSOL, METERED RESPIRATORY (INHALATION) 2 TIMES DAILY
Qty: 2 EACH | Refills: 5 | Status: SHIPPED | OUTPATIENT
Start: 2024-12-20

## 2024-12-20 RX ORDER — VALSARTAN AND HYDROCHLOROTHIAZIDE 320; 12.5 MG/1; MG/1
1 TABLET, FILM COATED ORAL DAILY
Qty: 90 TABLET | Refills: 0 | Status: SHIPPED | OUTPATIENT
Start: 2024-12-20

## 2024-12-20 RX ORDER — CLOPIDOGREL BISULFATE 75 MG/1
75 TABLET ORAL DAILY
Qty: 90 TABLET | Refills: 3 | Status: SHIPPED | OUTPATIENT
Start: 2024-12-20

## 2024-12-20 RX ORDER — AMLODIPINE BESYLATE 10 MG/1
10 TABLET ORAL DAILY
Qty: 90 TABLET | Refills: 0 | Status: SHIPPED | OUTPATIENT
Start: 2024-12-20 | End: 2025-03-20

## 2025-01-14 RX ORDER — AZITHROMYCIN 250 MG/1
TABLET, FILM COATED ORAL
Qty: 6 TABLET | Refills: 0 | Status: SHIPPED | OUTPATIENT
Start: 2025-01-14 | End: 2025-01-24

## 2025-01-23 RX ORDER — DOXEPIN HYDROCHLORIDE 100 MG/1
100 CAPSULE ORAL NIGHTLY
Qty: 90 CAPSULE | Refills: 3 | Status: SHIPPED | OUTPATIENT
Start: 2025-01-23 | End: 2026-01-23

## 2025-01-26 ENCOUNTER — TELEPHONE (OUTPATIENT)
Dept: INTERNAL MEDICINE CLINIC | Age: 89
End: 2025-01-26

## 2025-01-26 DIAGNOSIS — J84.10 PULMONARY FIBROSIS (HCC): ICD-10-CM

## 2025-01-26 DIAGNOSIS — J44.1 COPD WITH ACUTE EXACERBATION (HCC): Primary | ICD-10-CM

## 2025-02-19 DIAGNOSIS — R09.02 HYPOXEMIA: Primary | ICD-10-CM

## 2025-02-23 DIAGNOSIS — J84.9 INTERSTITIAL LUNG DISEASE (HCC): Primary | ICD-10-CM

## 2025-02-24 DIAGNOSIS — J96.11 CHRONIC RESPIRATORY FAILURE WITH HYPOXIA (HCC): ICD-10-CM

## 2025-02-24 DIAGNOSIS — J84.9 INTERSTITIAL LUNG DISEASE (HCC): Primary | ICD-10-CM

## 2025-02-25 ENCOUNTER — HOSPITAL ENCOUNTER (OUTPATIENT)
Age: 89
Discharge: HOME | End: 2025-02-25
Attending: INTERNAL MEDICINE
Payer: MEDICARE

## 2025-02-25 ENCOUNTER — HOSPITAL ENCOUNTER (OUTPATIENT)
Dept: CT IMAGING | Age: 89
Discharge: HOME | End: 2025-02-25
Attending: INTERNAL MEDICINE
Payer: MEDICARE

## 2025-02-25 DIAGNOSIS — J84.9 INTERSTITIAL LUNG DISEASE (HCC): ICD-10-CM

## 2025-02-25 PROBLEM — S02.2XXA NASAL FRACTURE: Status: RESOLVED | Noted: 2021-04-30 | Resolved: 2025-02-25

## 2025-02-25 PROBLEM — S62.609A: Status: RESOLVED | Noted: 2023-03-05 | Resolved: 2025-02-25

## 2025-02-25 PROBLEM — S62.619A: Status: RESOLVED | Noted: 2023-03-31 | Resolved: 2025-02-25

## 2025-02-25 PROBLEM — S62.91XD: Status: RESOLVED | Noted: 2023-03-07 | Resolved: 2025-02-25

## 2025-02-25 PROBLEM — J44.9 STAGE 3 SEVERE COPD BY GOLD CLASSIFICATION (HCC): Status: ACTIVE | Noted: 2025-02-25

## 2025-02-25 PROBLEM — J96.11 CHRONIC RESPIRATORY FAILURE WITH HYPOXIA (HCC): Status: ACTIVE | Noted: 2025-02-25

## 2025-02-25 PROBLEM — T17.908A ASPIRATION INTO RESPIRATORY TRACT: Status: ACTIVE | Noted: 2025-02-25

## 2025-02-25 LAB
BASE EXCESS BLDA CALC-SCNC: 5.9 MMOL/L (ref -3–3)
CO2 BLDA-SCNC: 73.6 MMOL/L
COHGB MFR BLDA: 0.7 % (ref 0–1.5)
HCO3 BLDA-SCNC: 31.4 MMOL/L (ref 21–29)
HGB BLDA-MCNC: 13.6 G/DL (ref 12–16)
METHGB MFR BLDA: 0.9 %
O2 THERAPY: ABNORMAL
PCO2 BLDA: 47.8 MMHG (ref 35–45)
PH BLDA: 7.42 [PH] (ref 7.35–7.45)
PO2 BLDA: 52.7 MMHG (ref 75–108)
SAO2 % BLDA: 86.5 %

## 2025-02-25 PROCEDURE — 82803 BLOOD GASES ANY COMBINATION: CPT

## 2025-02-25 PROCEDURE — 36600 WITHDRAWAL OF ARTERIAL BLOOD: CPT

## 2025-02-25 PROCEDURE — 36415 COLL VENOUS BLD VENIPUNCTURE: CPT

## 2025-02-25 PROCEDURE — 71250 CT THORAX DX C-: CPT

## 2025-02-25 NOTE — PROGRESS NOTES
ABG drawn x  1 attempt from Left Radial. Patient had positive modified Tarun's Test with good collateral circulation.  Patient was on room air at time of puncture. Pressure held for 5 minutes.  No bleeding or bruising noted at puncture site.  Patient tolerated procedure well    Writer and pt spoke and she voiced increased anxiety related to her recent falls.  Recent records reviewed from PCP and it appears per Dr. Louie that this could be tied to lasix meds/orthostasis (pt is referred to a cardiologist).  Pt voices frustration with her workplace/[employer] not accommodating her fully.  Writer and pt discussed strategies to communicate her needs with her supervisors.  Pt states that her mood is \"good\" and states \"this is the best I have been mentally in a while\".  Pt denies any active or passive SI HI VI.

## 2025-02-26 PROBLEM — E44.0 MODERATE PROTEIN-CALORIE MALNUTRITION: Status: ACTIVE | Noted: 2025-02-26

## 2025-02-27 ENCOUNTER — OFFICE VISIT (OUTPATIENT)
Dept: PULMONOLOGY | Age: 89
End: 2025-02-27
Payer: MEDICARE

## 2025-02-27 VITALS
DIASTOLIC BLOOD PRESSURE: 60 MMHG | BODY MASS INDEX: 23.13 KG/M2 | HEIGHT: 58 IN | WEIGHT: 110.2 LBS | OXYGEN SATURATION: 94 % | SYSTOLIC BLOOD PRESSURE: 118 MMHG | HEART RATE: 83 BPM

## 2025-02-27 DIAGNOSIS — J43.2 CENTRILOBULAR EMPHYSEMA (HCC): Primary | ICD-10-CM

## 2025-02-27 DIAGNOSIS — J98.11 ATELECTASIS OF LEFT LUNG: ICD-10-CM

## 2025-02-27 DIAGNOSIS — Z87.01 HISTORY OF PNEUMONIA: ICD-10-CM

## 2025-02-27 PROCEDURE — 3023F SPIROM DOC REV: CPT | Performed by: INTERNAL MEDICINE

## 2025-02-27 PROCEDURE — 1036F TOBACCO NON-USER: CPT | Performed by: INTERNAL MEDICINE

## 2025-02-27 PROCEDURE — 1090F PRES/ABSN URINE INCON ASSESS: CPT | Performed by: INTERNAL MEDICINE

## 2025-02-27 PROCEDURE — 99215 OFFICE O/P EST HI 40 MIN: CPT | Performed by: INTERNAL MEDICINE

## 2025-02-27 PROCEDURE — 1123F ACP DISCUSS/DSCN MKR DOCD: CPT | Performed by: INTERNAL MEDICINE

## 2025-02-27 PROCEDURE — G8427 DOCREV CUR MEDS BY ELIG CLIN: HCPCS | Performed by: INTERNAL MEDICINE

## 2025-02-27 PROCEDURE — 1159F MED LIST DOCD IN RCRD: CPT | Performed by: INTERNAL MEDICINE

## 2025-02-27 PROCEDURE — G8420 CALC BMI NORM PARAMETERS: HCPCS | Performed by: INTERNAL MEDICINE

## 2025-02-27 RX ORDER — BUDESONIDE 0.5 MG/2ML
1 INHALANT ORAL 2 TIMES DAILY
Qty: 60 EACH | Refills: 3 | Status: SHIPPED | OUTPATIENT
Start: 2025-02-27

## 2025-02-27 RX ORDER — ARFORMOTEROL TARTRATE 15 UG/2ML
1 SOLUTION RESPIRATORY (INHALATION) 2 TIMES DAILY
Qty: 120 ML | Refills: 3 | Status: SHIPPED | OUTPATIENT
Start: 2025-02-27

## 2025-02-27 ASSESSMENT — ENCOUNTER SYMPTOMS
BLOOD IN STOOL: 0
COUGH: 1
APNEA: 0
ABDOMINAL DISTENTION: 0
SINUS PRESSURE: 0
CONSTIPATION: 0
COLOR CHANGE: 0
DIARRHEA: 0
RHINORRHEA: 0
ABDOMINAL PAIN: 0
VOICE CHANGE: 0
STRIDOR: 0
CHEST TIGHTNESS: 0
VOMITING: 0
CHOKING: 0
SORE THROAT: 0
SHORTNESS OF BREATH: 1
BACK PAIN: 0
WHEEZING: 0

## 2025-02-27 NOTE — PROGRESS NOTES
Ivon HAHN Lyn    YOB: 1935     Date of Service:  2/27/2025     Chief Complaint   Patient presents with    Follow-up       HPI patient has been accompanied by her daughters to the office today.  Also discussed the case with patient's granddaughter who is also a pulmonologist.  Recent history of accidental fall resulting in right-sided rib fractures/chest wall hematoma complicated by community-acquired pneumonia-was hospitalized at [facility] between 1/31 and 2/4.  She has been requiring O2 which is new since her hospitalization.  She is currently undergoing rehabilitation at [facility].    Patient appears very fatigued and weak.  She is dyspneic at rest.  States that the cough is nonproductive.  Chest wall pain has improved.  Denies wheezing.  No fevers noted.    Allergies   Allergen Reactions    Oxycodone-Acetaminophen     Percocet [Oxycodone-Acetaminophen] Rash     Not sure but thinks it was this drug     Outpatient Medications Marked as Taking for the 2/27/25 encounter (Office Visit) with Gualberto Bhardwaj MD   Medication Sig Dispense Refill    budesonide (PULMICORT) 0.5 MG/2ML nebulizer suspension Take 2 mLs by nebulization 2 times daily 60 each 3    arformoterol tartrate (BROVANA) 15 MCG/2ML NEBU Take 1 ampule by nebulization 2 times daily 120 mL 3    doxepin (SINEQUAN) 100 MG capsule Take 1 capsule by mouth nightly 90 capsule 3    amLODIPine (NORVASC) 10 MG tablet Take 1 tablet by mouth daily 90 tablet 0    atorvastatin (LIPITOR) 10 MG tablet Take 1 tablet by mouth daily 90 tablet 3    Budeson-Glycopyrrol-Formoterol (BREZTRI AEROSPHERE) 160-9-4.8 MCG/ACT AERO Inhale 2 puffs into the lungs in the morning and at bedtime 2 each 5    ipratropium 0.5 mg-albuterol 2.5 mg (DUONEB) 0.5-2.5 (3) MG/3ML SOLN nebulizer solution Inhale 3 mLs into the lungs every 4 hours 360 mL 2    metoprolol (LOPRESSOR) 100 MG tablet Take 1 tablet by mouth 2 times daily 180 tablet 0    SITagliptin

## 2025-03-03 ENCOUNTER — TELEPHONE (OUTPATIENT)
Dept: PULMONOLOGY | Age: 89
End: 2025-03-03

## 2025-03-03 NOTE — TELEPHONE ENCOUNTER
Caroline from Speech therapy called to let us know that she was trying to schedule the pt for speech therapy that Dr Sawyer referred her for but the pt kept telling her to call her dr lane. Caroline stated it seems the pt is confused why she needs this. Caroline would like us to reach out to pt to explain why she needs it and then call Caroline back to give her the go ahead to call the pt and schedule her.     [facility]- [phone]

## 2025-03-05 DIAGNOSIS — J69.0 ASPIRATION PNEUMONIA OF LEFT LOWER LOBE, UNSPECIFIED ASPIRATION PNEUMONIA TYPE (HCC): Primary | ICD-10-CM

## 2025-03-10 ENCOUNTER — HOSPITAL ENCOUNTER (OUTPATIENT)
Dept: GENERAL RADIOLOGY | Age: 89
Discharge: HOME | End: 2025-03-10
Attending: INTERNAL MEDICINE
Payer: MEDICARE

## 2025-03-10 ENCOUNTER — HOSPITAL ENCOUNTER (OUTPATIENT)
Dept: SPEECH THERAPY | Age: 89
Setting detail: THERAPIES SERIES
Discharge: HOME | End: 2025-03-10
Attending: INTERNAL MEDICINE
Payer: MEDICARE

## 2025-03-10 DIAGNOSIS — J69.0 ASPIRATION PNEUMONIA OF LEFT LOWER LOBE, UNSPECIFIED ASPIRATION PNEUMONIA TYPE (HCC): ICD-10-CM

## 2025-03-10 PROCEDURE — 92611 MOTION FLUOROSCOPY/SWALLOW: CPT

## 2025-03-10 PROCEDURE — 74230 X-RAY XM SWLNG FUNCJ C+: CPT

## 2025-03-10 NOTE — PROCEDURES
INSTRUMENTAL SWALLOW REPORT  MODIFIED BARIUM SWALLOW    NAME: Ivon Lyn     : 1935  MRN: [id number]       Date of Eval: 3/10/2025     Ordering Physician: Dr. Bhardwaj  Referring Diagnosis: Dysphagia    Past Medical History:  has a past medical history of Arthritis, Closed fracture of multiple sites of phalanges of hand, Diabetes mellitus (HCC), HYPERCHOLESTERAEMIA, Hypertension, and TIA (transient ischaemic attack).  Past Surgical History:  has a past surgical history that includes colectomy; joint replacement (2010); Total knee arthroplasty (2010); Patella fracture surgery (Left, 2021); and Finger Closed Reduction (Right, 3/14/2023).    CXR: none recent    Prior MBS Results: none    Patient Complaints/Reason for Referral:  Ivon Lyn was referred for a MBS to assess the efficiency of his/her swallow function, assess for aspiration, and to make recommendations regarding safe dietary consistencies, effective compensatory strategies, and safe eating environment.    Onset of problem: 3/10/25    Behavior/Cognition: alert, pleasant, cooperative   Vision/Hearing: WFL for assessment     Impressions:  Pt presents with swallow function which is grossly WFL. Pt seated upright and readily accepted all trials of PO. Noted interlabial escape of thin liquids and <half thin liquid bolus loss to FOM. Pt demonstrated slightly prolonged, yet functional mastication of regular solids. Trace-min residue remaining along lingual surface. Cleared with cued second swallow vs provided liquid wash. Delayed swallow initiation with thin liquid bolus head progressing towards/in the pyriforms, slowed/instances of incomplete epiglottic inversion, and slightly reduced hyolaryngeal elevation resulted in very trace shallow penetration before/during the swallow. Penetration was completely self clearing upon completion of swallow. No additional penetration with alternate consistencies and no aspiration

## 2025-03-18 DIAGNOSIS — J98.11 ATELECTASIS OF LEFT LUNG: ICD-10-CM

## 2025-03-18 DIAGNOSIS — T17.908D ASPIRATION INTO RESPIRATORY TRACT, SUBSEQUENT ENCOUNTER: Primary | ICD-10-CM

## 2025-03-18 DIAGNOSIS — J44.1 COPD WITH ACUTE EXACERBATION (HCC): ICD-10-CM

## 2025-03-18 DIAGNOSIS — J44.9 STAGE 3 SEVERE COPD BY GOLD CLASSIFICATION (HCC): ICD-10-CM

## 2025-03-18 DIAGNOSIS — J96.11 CHRONIC RESPIRATORY FAILURE WITH HYPOXIA: ICD-10-CM

## 2025-03-18 DIAGNOSIS — J90 PLEURAL EFFUSION DUE TO ANOTHER DISORDER: ICD-10-CM

## 2025-03-18 DIAGNOSIS — J84.10 PULMONARY FIBROSIS (HCC): ICD-10-CM

## 2025-03-19 DIAGNOSIS — J96.11 CHRONIC RESPIRATORY FAILURE WITH HYPOXIA: Primary | ICD-10-CM

## 2025-03-19 DIAGNOSIS — J84.10 PULMONARY FIBROSIS (HCC): ICD-10-CM

## 2025-03-19 DIAGNOSIS — J44.9 STAGE 3 SEVERE COPD BY GOLD CLASSIFICATION (HCC): ICD-10-CM

## 2025-03-19 DIAGNOSIS — J90 PLEURAL EFFUSION: ICD-10-CM

## 2025-03-19 DIAGNOSIS — T17.908D ASPIRATION INTO RESPIRATORY TRACT, SUBSEQUENT ENCOUNTER: ICD-10-CM

## 2025-03-19 DIAGNOSIS — J84.10 CHRONIC FIBROSIS OF LUNG (HCC): ICD-10-CM

## 2025-03-19 DIAGNOSIS — I10 PRIMARY HYPERTENSION: ICD-10-CM

## 2025-03-19 DIAGNOSIS — J98.11 ATELECTASIS: ICD-10-CM

## 2025-03-21 RX ORDER — LEVOTHYROXINE SODIUM 50 UG/1
50 TABLET ORAL DAILY
Qty: 90 TABLET | Refills: 1 | Status: SHIPPED | OUTPATIENT
Start: 2025-03-21

## 2025-03-21 RX ORDER — ATORVASTATIN CALCIUM 10 MG/1
10 TABLET, FILM COATED ORAL DAILY
Qty: 90 TABLET | Refills: 3 | Status: SHIPPED | OUTPATIENT
Start: 2025-03-21 | End: 2026-03-16

## 2025-03-21 RX ORDER — AMLODIPINE BESYLATE 10 MG/1
10 TABLET ORAL DAILY
Qty: 90 TABLET | Refills: 0 | Status: SHIPPED | OUTPATIENT
Start: 2025-03-21 | End: 2025-06-19

## 2025-04-01 DIAGNOSIS — N39.490 OVERFLOW INCONTINENCE: Primary | ICD-10-CM

## 2025-04-03 DIAGNOSIS — Z91.81 AT MAXIMUM RISK FOR FALL: ICD-10-CM

## 2025-04-03 DIAGNOSIS — R26.2 UNABLE TO AMBULATE: Primary | ICD-10-CM

## 2025-04-03 DIAGNOSIS — R26.89 IMBALANCE: ICD-10-CM

## 2025-04-03 DIAGNOSIS — J96.11 CHRONIC RESPIRATORY FAILURE WITH HYPOXIA: ICD-10-CM

## 2025-04-04 DIAGNOSIS — J43.2 CENTRILOBULAR EMPHYSEMA (HCC): ICD-10-CM

## 2025-04-04 RX ORDER — ARFORMOTEROL TARTRATE 15 UG/2ML
1 SOLUTION RESPIRATORY (INHALATION) 2 TIMES DAILY
Qty: 120 ML | Refills: 3 | Status: SHIPPED | OUTPATIENT
Start: 2025-04-04 | End: 2025-04-08 | Stop reason: SDUPTHER

## 2025-04-04 RX ORDER — BLOOD-GLUCOSE METER
1 KIT MISCELLANEOUS DAILY
Qty: 1 KIT | Refills: 0 | Status: SHIPPED | OUTPATIENT
Start: 2025-04-04

## 2025-04-04 RX ORDER — BUDESONIDE 0.5 MG/2ML
1 INHALANT ORAL 2 TIMES DAILY
Qty: 60 EACH | Refills: 3 | Status: SHIPPED | OUTPATIENT
Start: 2025-04-04 | End: 2025-04-08 | Stop reason: SDUPTHER

## 2025-04-04 RX ORDER — GLUCOSAMINE HCL/CHONDROITIN SU 500-400 MG
CAPSULE ORAL
Qty: 50 STRIP | Refills: 5 | Status: CANCELLED | OUTPATIENT
Start: 2025-04-04

## 2025-05-01 DIAGNOSIS — J44.9 STAGE 3 SEVERE COPD BY GOLD CLASSIFICATION (HCC): ICD-10-CM

## 2025-05-01 DIAGNOSIS — J43.2 CENTRILOBULAR EMPHYSEMA (HCC): ICD-10-CM

## 2025-05-01 RX ORDER — ARFORMOTEROL TARTRATE 15 UG/2ML
1 SOLUTION RESPIRATORY (INHALATION) 2 TIMES DAILY
Qty: 360 ML | Refills: 3 | Status: SHIPPED | OUTPATIENT
Start: 2025-05-01

## 2025-05-01 RX ORDER — BUDESONIDE 0.5 MG/2ML
1 INHALANT ORAL 2 TIMES DAILY
Qty: 180 EACH | Refills: 3 | Status: SHIPPED | OUTPATIENT
Start: 2025-05-01

## 2025-05-24 DIAGNOSIS — J96.11 CHRONIC RESPIRATORY FAILURE WITH HYPOXIA (HCC): Primary | ICD-10-CM

## 2025-05-24 DIAGNOSIS — J43.2 CENTRILOBULAR EMPHYSEMA (HCC): ICD-10-CM

## 2025-05-24 DIAGNOSIS — J84.10 PULMONARY FIBROSIS (HCC): ICD-10-CM

## 2025-05-24 RX ORDER — NEBULIZER ACCESSORIES
1 KIT MISCELLANEOUS DAILY
Qty: 2 KIT | Refills: 0 | Status: SHIPPED | OUTPATIENT
Start: 2025-05-24

## 2025-06-16 RX ORDER — AMLODIPINE BESYLATE 10 MG/1
10 TABLET ORAL DAILY
Qty: 90 TABLET | Refills: 1 | Status: SHIPPED | OUTPATIENT
Start: 2025-06-16 | End: 2025-09-14

## 2025-06-16 RX ORDER — CLOPIDOGREL BISULFATE 75 MG/1
75 TABLET ORAL DAILY
Qty: 90 TABLET | Refills: 3 | Status: SHIPPED | OUTPATIENT
Start: 2025-06-16

## 2025-06-16 RX ORDER — METOPROLOL TARTRATE 100 MG/1
100 TABLET ORAL 2 TIMES DAILY
Qty: 180 TABLET | Refills: 1 | Status: SHIPPED | OUTPATIENT
Start: 2025-06-16 | End: 2025-09-14

## 2025-06-16 RX ORDER — VALSARTAN AND HYDROCHLOROTHIAZIDE 320; 12.5 MG/1; MG/1
1 TABLET, FILM COATED ORAL DAILY
Qty: 90 TABLET | Refills: 1 | Status: SHIPPED | OUTPATIENT
Start: 2025-06-16

## 2025-08-13 DIAGNOSIS — J44.1 CHRONIC OBSTRUCTIVE PULMONARY DISEASE WITH ACUTE EXACERBATION (HCC): Primary | ICD-10-CM

## 2025-08-13 RX ORDER — ALBUTEROL SULFATE 0.83 MG/ML
SOLUTION RESPIRATORY (INHALATION)
Qty: 360 ML | Refills: 5 | Status: SHIPPED | OUTPATIENT
Start: 2025-08-13

## 2025-08-18 PROBLEM — T17.908A ASPIRATION INTO RESPIRATORY TRACT: Status: RESOLVED | Noted: 2025-02-25 | Resolved: 2025-08-18

## 2025-08-18 PROBLEM — K58.0 IRRITABLE BOWEL SYNDROME WITH DIARRHEA: Status: RESOLVED | Noted: 2022-04-13 | Resolved: 2025-08-18

## 2025-09-07 DIAGNOSIS — E03.9 ACQUIRED HYPOTHYROIDISM: ICD-10-CM

## 2025-09-07 DIAGNOSIS — J44.9 STAGE 3 SEVERE COPD BY GOLD CLASSIFICATION (HCC): ICD-10-CM

## 2025-09-07 DIAGNOSIS — J43.2 CENTRILOBULAR EMPHYSEMA (HCC): ICD-10-CM

## 2025-09-07 DIAGNOSIS — E11.8 CONTROLLED TYPE 2 DIABETES MELLITUS WITH COMPLICATION, WITHOUT LONG-TERM CURRENT USE OF INSULIN (HCC): Primary | ICD-10-CM

## 2025-09-07 RX ORDER — CLOPIDOGREL BISULFATE 75 MG/1
75 TABLET ORAL DAILY
Qty: 90 TABLET | Refills: 3 | Status: SHIPPED | OUTPATIENT
Start: 2025-09-07

## 2025-09-07 RX ORDER — ARFORMOTEROL TARTRATE 15 UG/2ML
1 SOLUTION RESPIRATORY (INHALATION) 2 TIMES DAILY
Qty: 360 ML | Refills: 3 | Status: SHIPPED | OUTPATIENT
Start: 2025-09-07

## 2025-09-07 RX ORDER — BUDESONIDE 0.5 MG/2ML
1 INHALANT ORAL 2 TIMES DAILY
Qty: 180 EACH | Refills: 3 | Status: SHIPPED | OUTPATIENT
Start: 2025-09-07

## (undated) DEVICE — PADDING CAST W4INXL4YD NONSTERILE COT RAYON MICROPLEATED

## (undated) DEVICE — SOLUTION IV IRRIG 500ML 0.9% SODIUM CHL 2F7123

## (undated) DEVICE — GLOVE ORANGE PI 7   MSG9070

## (undated) DEVICE — BANDAGE COMPR W4INXL12FT E DISP ESMARCH EVEN

## (undated) DEVICE — SHEET,DRAPE,53X77,STERILE: Brand: MEDLINE

## (undated) DEVICE — T-DRAPE,EXTREMITY,STERILE: Brand: MEDLINE

## (undated) DEVICE — SUTURE VCRL SZ 2-0 L18IN ABSRB UD CT-1 L36MM 1/2 CIR J839D

## (undated) DEVICE — GAUZE,SPONGE,4"X4",8PLY,STRL,LF,10/TRAY: Brand: MEDLINE

## (undated) DEVICE — 2.4 MM X 15 INCH DRILL TIP PASSING                                    PIN, STERILE: Brand: ENDOBUTTON

## (undated) DEVICE — CHLORAPREP 26ML ORANGE

## (undated) DEVICE — SUTURE FIBERWIRE SZ 2 W/ TAPERED NEEDLE BLUE L38IN NONABSORB BLU L26.5MM 1/2 CIRCLE AR7200

## (undated) DEVICE — SUTURE VCRL SZ 0 L18IN ABSRB UD L36MM CT-1 1/2 CIR J840D

## (undated) DEVICE — TOWEL,OR,DSP,ST,BLUE,STD,6/PK,12PK/CS: Brand: MEDLINE

## (undated) DEVICE — BANDAGE COBAN 6 IN WND 6INX5YD FOAM

## (undated) DEVICE — STOCKINETTE,IMPERVIOUS,12X48,STERILE: Brand: MEDLINE

## (undated) DEVICE — Device: Brand: MICROAIRE®

## (undated) DEVICE — ESMARK: Brand: DEROYAL

## (undated) DEVICE — SUTURE STRATAFIX SPRL SZ 2 0 L14IN ABSRB UD MH L36MM 1 2 CIR SXMD2B401

## (undated) DEVICE — SINGLE USE DEVICE INTENDED TO COVER EXPOSED ENDS OF ORTHOPEDIC PIN AND K-WIRES TO HELP PROTECT THE EXPOSED WIRE FROM SNAGGING ON CLOTHING.: Brand: OXBORO™ PIN COVER

## (undated) DEVICE — SUTURE VCRL UD BR CT 3-0 18IN CT1 J838D

## (undated) DEVICE — STERILE LATEX POWDER-FREE SURGICAL GLOVESWITH NITRILE COATING: Brand: PROTEXIS

## (undated) DEVICE — C-ARMOR C-ARM EQUIPMENT COVERS CLEAR STERILE UNIVERSAL FIT 12 PER CASE: Brand: C-ARMOR

## (undated) DEVICE — GLOVE SURG 7 LTX STRL TRIFLEX LNG FINGER

## (undated) DEVICE — GOWN,SIRUS,NON REINFRCD,LARGE,SET IN SL: Brand: MEDLINE

## (undated) DEVICE — SYRINGE, LUER LOCK, 10ML: Brand: MEDLINE

## (undated) DEVICE — ELECTRODE PT RET AD L9FT HI MOIST COND ADH HYDRGEL CORDED

## (undated) DEVICE — SPLINT QUICK STEP SCOTCHCAST 4 X 30

## (undated) DEVICE — DRESSING PETRO W3XL8IN N ADH OIL EMUL GZ CURAD

## (undated) DEVICE — MERCY FAIRFIELD TURNOVER KIT: Brand: MEDLINE INDUSTRIES, INC.

## (undated) DEVICE — ZIMMER® STERILE DISPOSABLE TOURNIQUET CUFF WITH PLC, DUAL PORT, SINGLE BLADDER, 18 IN. (46 CM)

## (undated) DEVICE — GOWN,SIRUS,POLYRNF,SETINSLV,L,20/CS: Brand: MEDLINE

## (undated) DEVICE — 450 ML BOTTLE OF 0.05% CHLORHEXIDINE GLUCONATE IN 99.95% STERILE WATER FOR IRRIGATION, USP AND APPLICATOR.: Brand: IRRISEPT ANTIMICROBIAL WOUND LAVAGE

## (undated) DEVICE — BANDAGE COMPR W6INXL10YD ST M E WHITE/BEIGE

## (undated) DEVICE — UNDERGLOVE SURG SZ 8 FNGR THK0.21MIL GRN LTX BEAD CUF

## (undated) DEVICE — Device

## (undated) DEVICE — BANDAGE COMPR W4INXL5YD BGE HI E W/ REM CLP SURE-WRAP

## (undated) DEVICE — GLOVE ORANGE PI 7 1/2   MSG9075

## (undated) DEVICE — DRAPE C ARM W54XL84IN MINI FOR OEC 6800

## (undated) DEVICE — DRAPE C ARM UNIV W41XL74IN CLR PLAS XR VELC CLSR POLY STRP

## (undated) DEVICE — SUTURE MCRYL SZ 4-0 L18IN ABSRB UD L19MM PS-2 3/8 CIR PRIM Y496G

## (undated) DEVICE — MAJOR SET UP PK

## (undated) DEVICE — INTENDED FOR TISSUE SEPARATION, AND OTHER PROCEDURES THAT REQUIRE A SHARP SURGICAL BLADE TO PUNCTURE OR CUT.: Brand: BARD-PARKER ® STAINLESS STEEL BLADES

## (undated) DEVICE — COTTON UNDERCAST PADDING,CRIMPED FINISH: Brand: WEBRIL

## (undated) DEVICE — DRAPE,U/ SHT,SPLIT,PLAS,STERIL: Brand: MEDLINE

## (undated) DEVICE — HYPODERMIC SAFETY NEEDLE: Brand: MAGELLAN